# Patient Record
Sex: MALE | Race: WHITE | URBAN - METROPOLITAN AREA
[De-identification: names, ages, dates, MRNs, and addresses within clinical notes are randomized per-mention and may not be internally consistent; named-entity substitution may affect disease eponyms.]

---

## 2019-05-02 ENCOUNTER — OFFICE VISIT (OUTPATIENT)
Dept: PRIMARY CARE CLINIC | Age: 19
End: 2019-05-02
Payer: COMMERCIAL

## 2019-05-02 ENCOUNTER — TELEPHONE (OUTPATIENT)
Dept: ENDOCRINOLOGY | Age: 19
End: 2019-05-02

## 2019-05-02 VITALS
DIASTOLIC BLOOD PRESSURE: 89 MMHG | WEIGHT: 220 LBS | HEIGHT: 73 IN | HEART RATE: 91 BPM | BODY MASS INDEX: 29.16 KG/M2 | SYSTOLIC BLOOD PRESSURE: 131 MMHG

## 2019-05-02 DIAGNOSIS — R73.09 ELEVATED GLUCOSE: ICD-10-CM

## 2019-05-02 DIAGNOSIS — E11.9 DIABETES MELLITUS, NEW ONSET (HCC): Primary | ICD-10-CM

## 2019-05-02 DIAGNOSIS — E11.9 DIABETES MELLITUS, NEW ONSET (HCC): ICD-10-CM

## 2019-05-02 LAB
A/G RATIO: 2 (ref 1.1–2.2)
ALBUMIN SERPL-MCNC: 4.5 G/DL (ref 3.4–5)
ALP BLD-CCNC: 94 U/L (ref 40–129)
ALT SERPL-CCNC: 16 U/L (ref 10–40)
ANION GAP SERPL CALCULATED.3IONS-SCNC: 18 MMOL/L (ref 3–16)
AST SERPL-CCNC: 8 U/L (ref 15–37)
BILIRUB SERPL-MCNC: 0.6 MG/DL (ref 0–1)
BILIRUBIN, POC: NEGATIVE
BLOOD URINE, POC: NEGATIVE
BUN BLDV-MCNC: 10 MG/DL (ref 7–20)
CALCIUM SERPL-MCNC: 10 MG/DL (ref 8.3–10.6)
CHLORIDE BLD-SCNC: 96 MMOL/L (ref 99–110)
CHOLESTEROL, TOTAL: 241 MG/DL (ref 0–199)
CLARITY, POC: ABNORMAL
CO2: 25 MMOL/L (ref 21–32)
COLOR, POC: ABNORMAL
CREAT SERPL-MCNC: <0.5 MG/DL (ref 0.9–1.3)
CREATININE URINE: 71.2 MG/DL (ref 39–259)
GFR AFRICAN AMERICAN: >60
GFR NON-AFRICAN AMERICAN: >60
GLOBULIN: 2.3 G/DL
GLUCOSE BLD-MCNC: 340 MG/DL (ref 70–99)
GLUCOSE URINE, POC: ABNORMAL
HBA1C MFR BLD: 14 %
HDLC SERPL-MCNC: 35 MG/DL (ref 40–60)
KETONES, POC: ABNORMAL
LDL CHOLESTEROL CALCULATED: 151 MG/DL
LEUKOCYTE EST, POC: NEGATIVE
MICROALBUMIN UR-MCNC: <1.2 MG/DL
MICROALBUMIN/CREAT UR-RTO: NORMAL MG/G (ref 0–30)
NITRITE, POC: NEGATIVE
PH, POC: 6
POTASSIUM SERPL-SCNC: 4.7 MMOL/L (ref 3.5–5.1)
PROTEIN, POC: NEGATIVE
SODIUM BLD-SCNC: 139 MMOL/L (ref 136–145)
SPECIFIC GRAVITY, POC: 1.02
TOTAL PROTEIN: 6.8 G/DL (ref 6.4–8.2)
TRIGL SERPL-MCNC: 277 MG/DL (ref 0–150)
UROBILINOGEN, POC: 0.2
VLDLC SERPL CALC-MCNC: 55 MG/DL

## 2019-05-02 PROCEDURE — 81002 URINALYSIS NONAUTO W/O SCOPE: CPT | Performed by: FAMILY MEDICINE

## 2019-05-02 PROCEDURE — 99205 OFFICE O/P NEW HI 60 MIN: CPT | Performed by: FAMILY MEDICINE

## 2019-05-02 PROCEDURE — 83036 HEMOGLOBIN GLYCOSYLATED A1C: CPT | Performed by: FAMILY MEDICINE

## 2019-05-02 SDOH — HEALTH STABILITY: MENTAL HEALTH: HOW OFTEN DO YOU HAVE A DRINK CONTAINING ALCOHOL?: NEVER

## 2019-05-02 ASSESSMENT — ENCOUNTER SYMPTOMS
VOMITING: 0
EYE PAIN: 0
SORE THROAT: 0
NAUSEA: 0
RHINORRHEA: 0
COUGH: 0
COLOR CHANGE: 0
CONSTIPATION: 0
DIARRHEA: 0
ABDOMINAL PAIN: 0
SHORTNESS OF BREATH: 0

## 2019-05-02 NOTE — TELEPHONE ENCOUNTER
I called this patient to see if he could come in May 9 at 3:40 but he cannot do this. He is off Mondays Wednesdays and Fridays.

## 2019-05-02 NOTE — PATIENT INSTRUCTIONS
Patient Education        Home Blood Glucose Test: About This Test  What is it? A home blood glucose test measures the amount of a type of sugar, called glucose, in your blood. Why is this test done? People who have diabetes need to check the amount of glucose in their blood. A home blood glucose test is an easy way to test your blood at home or when you are away from home. The results help you know when to take action to keep your blood glucose levels in a target range. How can you prepare for the test?  · Check the expiration date on the bottle of testing strips. Do not use test strips that have . · Match the code number on the testing strips bottle with the number on the meter. If the numbers do not match, follow the directions with the meter for changing the code number. What happens before the test?  The supplies you will need for testing blood glucose include:  · A blood glucose meter. · Testing strips. These are made to be used with a specific model of meter. · Sugar control solutions. Some meters require a specific solution. Many new meters are made to operate without a control solution. · Short needles called lancets for pricking your skin. · A pen-sized mattson for the lancet (lancet device), which positions the lancet and controls how deeply it goes into your skin. · Clean cotton balls. These are used to stop the bleeding from the testing site. What happens during the test?  A home blood glucose test involves pricking your finger, palm, or forearm with a lancet to collect a drop of blood. The blood drop is placed on a test strip, which you insert into the blood glucose meter. The instructions for testing are slightly different for each blood glucose meter model. Follow the instructions that came with your meter. · Wash your hands with warm, soapy water. Dry them well with a clean towel.  You may also use an alcohol wipe to clean your finger or other site, but make sure your hands are dry before the test.  · Insert a clean lancet into the lancet device. · Remove a test strip from the test strip bottle. Replace the lid immediately to keep moisture away from the other strips. · Follow the instructions that came with your meter to get it ready. · Use the lancet device to stick the side of your fingertip with the lancet. Do not stick the tip of your finger. Some blood sugar meters use lancet devices that take the blood sample from other sites, such as the palm of the hand or the forearm. But the finger is usually the most accurate place to test blood sugar. · Put a drop of blood on the correct spot on the test strip. · Apply pressure with a clean cotton ball to stop the bleeding. · Follow the directions that came with the meter to get the results. · Write down the results and the time that you tested your blood. Some meters will store the results for you. What else should you know about the test?  The American Diabetes Association (ADA) recommends that you stay within the following blood glucose level ranges. But depending on your health, you and your doctor may set a different range for you. For nonpregnant adults with diabetes:  · 80 milligrams per deciliter (mg/dL) to 130 mg/dL before a meal  · Less than 180 mg/dL 1 to 2 hours after a meal  For women who have diabetes related to pregnancy (gestational diabetes):  · 95 mg/dL or less before breakfast  · 120 to 140 mg/dL (or lower) 1 to 2 hours after a meal  How long does the test take? · The blood glucose meter will show the results of the test in a minute or less. Where can you learn more? Go to https://CytoVivajordynInmoo.BareedEE. org and sign in to your NextPage account. Enter E096 in the [facility] box to learn more about \"Home Blood Glucose Test: About This Test.\"     If you do not have an account, please click on the \"Sign Up Now\" link.   Current as of: July 25, 2018  Content Version: 11.9  © 8079-9977 Healthwise, Incorporated. Care instructions adapted under license by [facility] ([facility]). If you have questions about a medical condition or this instruction, always ask your healthcare professional. [address] any warranty or liability for your use of this information. Patient Education        Learning About Insulin Pens  What is an insulin pen? An insulin pen is a device for giving insulin shots. It looks like a pen. Inside the pen is a needle and a cartridge filled with insulin. You can set the dose of insulin with a dial on the outside of the pen. You use the pen to give the insulin shot (injection). Both disposable and reusable insulin pens are available. With a disposable pen, a set amount of insulin comes in the pen ready to use. When the insulin is used up, you throw the pen away. You use a new pen the next time you need insulin. With a reusable pen, you don't throw the pen away. Instead, you reload the pen with a pre-measured cartridge of insulin. When the insulin is used up, you insert a new cartridge into the pen. Disposable and reusable pens both need a new needle with each shot. The needles come in different lengths and widths. Mellette needles will prevent injecting into the muscle, especially in children or people who are lean. Thinner-width needles reduce the pricking sensation. Width is measured by gauge. The higher the number, the thinner the needle. Why do some people prefer pens? · Most people find that insulin pens are easier to use than a bottle and syringe. · Many people feel less pain (or no pain) with the smaller insulin pen needle, compared to a syringe needle. · Insulin pens may help you give yourself more accurate doses. When you draw insulin into a syringe, you must carefully measure so that you don't get too much or too little. But with a pen, you set a dial for the amount of insulin you want, and then you push the button.   · Insulin pens may work better than syringes for people who don't see well or who have problems like arthritis that make it harder to use a syringe. · Using an insulin pen draws less attention from others. You can give yourself insulin with fewer people noticing. · You don't need to carry insulin bottles and syringes everywhere you go. An insulin pen fits into a pocket or purse. What should you know about insulin pens? Each pen delivers a different brand and type (or types) of insulin. Some deliver rapid-acting insulin. Others deliver long-acting insulin. And some pens deliver a mixture of both in one shot. Pens have different colored labels, cartridge holders, or dosing knobs. Many pens have special features. For example, some pens have springs so that it takes less force to deliver a dose of insulin. Other pens have signals you can hear that let you know the insulin has been delivered. Some have memory to show the amount and time of the last dose. How do you use an insulin pen? 1. For a reusable pen, put the insulin cartridge into the pen. Disposable pens already have an insulin cartridge. Follow the directions for how to screw a new needle onto your pen. 2. Remove the outer cap from the needle. Keep this cap to use later. 3. Remove the inner cover from the needle. Be careful not to prick yourself. 4. Before each shot, prime the needle. Priming removes air from the needle. Turn the dose knob to 2 units. Hold your pen with the needle pointing up. Tap the cartridge mattson gently to move any air bubbles to the top. Push the injection button all the way in. Watch for a stream or drop of insulin to come out of the needle. If it does not, repeat this step again. 5. Clean the area of skin where you will give the shot. If you use alcohol to clean the skin, let it dry. Use a different spot each time you inject insulin. That's because using the same spot every time can cause bumps or pits to form in the skin.  For example, inject your insulin above your belly button, then the next time use your upper thigh, and then the next time inject below your belly button. 6. Turn the dose knob to the number of units of insulin you need to inject. Push the needle into your skin. Most people can inject using a 90-degree angle and without pinching the skin. Adults and children who are very lean and people who use longer needles may need to pinch the skin to avoid injecting into muscle. 7. Put your thumb on the injection button and push it in until it stops. Keep the pen in your skin. Hold the dose knob in for 10 seconds (or to the number that the  recommends). Then pull the needle out of your skin. Do not rub the area. 8. Put only the outer cap back over the needle. The thin inner cover is harder to put back on, and you could stick yourself. 9. After covering the needle with the outer cap, unscrew the needle and throw it away in a sharps container or other solid plastic container. You can get a sharps container at your drugstore. 10. Always read the insulin package information that tells the best way to store your insulin pen and insulin cartridges. In general, unopened insulin for pens will last longer if it is kept in the refrigerator. After insulin is opened, most manufacturers say to store it at room temperature. Don't share insulin pens with anyone else who uses insulin. Even when the needle is changed, an insulin pen can carry bacteria or blood that can make another person sick. Where can you learn more? Go to https://NHC Beauty [email or web address].YouChe.com. org and sign in to your Salesvue account. Enter M910 in the OneTwoSee box to learn more about \"Learning About Insulin Pens. \"     If you do not have an account, please click on the \"Sign Up Now\" link. Current as of: July 25, 2018  Content Version: 11.9  © 5943-0284 [employer], Incorporated. Care instructions adapted under license by [facility] ([facility]).  If you have questions about a medical condition or this instruction, always ask your healthcare professional. [address] any warranty or liability for your use of this information. Patient Education        Diabetes Blood Sugar Emergencies: Your Action Plan  How can you prevent a blood sugar emergency? An important part of living with diabetes is keeping your blood sugar in your target range. You'll need to know what to do if it's too high or too low. Managing your blood sugar levels helps you avoid emergencies. This care sheet will teach you about the signs of high and low blood sugar. It will help you make an action plan with your doctor for when these signs occur. Low blood sugar is more likely to happen if you take certain medicines for diabetes. It can also happen if you skip a meal, drink alcohol, or exercise more than usual.  You may get high blood sugar if you eat differently than you normally do. One example is eating more carbohydrate than usual. Having a cold, the flu, or other sudden illness can also cause high blood sugar levels. Levels can also rise if you miss a dose of medicine. Any change in how you take your medicine may affect your blood sugar level. So it's important to work with your doctor before you make any changes. Check your blood sugar  Work with your doctor to fill in the blank spaces below that apply to you. Track your levels, know your target range, and write down ways you can get your blood sugar back in your target range. A log book can help you track your levels. Take the book to all of your medical appointments. · Check your blood sugar _____ times a day, at these times:________________________________________________. (For example: Before meals, at bedtime, before exercise, during exercise, other.)  · Your blood sugar target range before a meal is ___________________. Your blood sugar target range after a meal is _______________________.   · Do can then go back to your regular testing schedule. If your symptoms or blood sugar levels are getting worse or have not improved after 15 minutes, seek medical care right away. Here are some examples of quick-sugar foods with 15 grams of carbohydrate:  · 3 or 4 glucose tablets  · 1 tube of glucose gel  · Hard candy (such as 3 Jolly Ranchers or 5 to 7 Life Savers)  · ½ cup to ¾ cup (4 to 6 ounces) of fruit juice or regular (not diet) soda  High blood sugar  If you have symptoms of high blood sugar, check your blood sugar. Your goal is to get your level back to your target range. If it's above ______ ( for example, above 250), follow these steps:  · If you missed a dose of your diabetes medicine, take it now. Take only the amount of medicine that you have been prescribed. Do not take more or less medicine. · Give yourself insulin if your doctor has prescribed it for high blood sugar. · Test for ketones, if the doctor told you to do so. If the results of the ketone test show a moderate-to-large amount of ketones, call the doctor for advice. · Wait 30 minutes after you take the extra insulin or the missed medicine. Check your blood sugar again. If your symptoms or blood sugar levels are getting worse or have not improved after taking these steps, seek medical care right away. Follow-up care is a key part of your treatment and safety. Be sure to make and go to all appointments, and call your doctor if you are having problems. It's also a good idea to know your test results and keep a list of the medicines you take. Where can you learn more? Go to https://chelisaewrobert.Accumuli Security. org and sign in to your Roadster account. Enter O639 in the [employer] box to learn more about \"Diabetes Blood Sugar Emergencies: Your Action Plan. \"     If you do not have an account, please click on the \"Sign Up Now\" link. Current as of: July 25, 2018  Content Version: 11.9  © 2869-7638 [email or web address], [facility].  Care

## 2019-05-02 NOTE — PROGRESS NOTES
Chief Complaint   Patient presents with    New Patient           HPI:  Kim Castillo is a 19 y.o. (: 2000) here today as a new patient. Well Adult Physical: Kim Castillo is here for a comprehensive physical exam. He reports that he is trying to join [employer]. He says that when he went about 3 weeks ago for his physical and when he was there they did a urine check. He says that he was told he had elevated sugar in his urine. He says that he was told that he should see his PCP and had a glucose blood test.  He notes no blurred vision, denies polyuria and nocturia or numbness or tingling of feet. He tells me he lost 100 lb in the last year though it was \"intentional\" with dietary changes. Do you take any herbs or supplements that were not prescribed by a doctor? no  Are you taking calcium supplements? no   Are you taking aspirin daily? No      He  is exercising 3 time a week for 1 hour    He is somewhat working on managing his diet. 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 Review of Systems   Constitutional: Negative for chills and fever. HENT: Negative for ear pain, rhinorrhea and sore throat. Eyes: Negative for pain and visual disturbance. Respiratory: Negative for cough and shortness of breath. Cardiovascular: Negative for chest pain and palpitations. Gastrointestinal: Negative for abdominal pain, constipation, diarrhea, nausea and vomiting. Genitourinary: Negative for dysuria and frequency. Musculoskeletal: Negative for joint swelling and myalgias. Skin: Negative for color change and rash. Neurological: Negative for weakness, numbness and headaches. Hematological: Negative for adenopathy. Does not bruise/bleed easily. Psychiatric/Behavioral: Negative for dysphoric mood, self-injury and suicidal ideas. The patient is not nervous/anxious.            Allergies not on file  New Prescriptions    BLOOD GLUCOSE TEST STRIPS (RELION GLUCOSE TEST STRIPS) STRIP    1 each by In Vitro route 4 times daily As needed. BLOOD PRESSURE MONITORING (RELION BLOOD PRESSURE MONITOR) KIT    Dispense with supplies and strips sufficient for tid testing (uncontrolled insulin dependent diabetes). INSULIN GLARGINE (BASAGLAR KWIKPEN) 100 UNIT/ML INJECTION PEN    Inject 10 Units into the skin nightly    INSULIN PEN NEEDLE (B-D ULTRAFINE III SHORT PEN) 31G X 8 MM MISC    1 each by Does not apply route daily       Meds Prior to visit:  No current outpatient medications on file prior to visit. No current facility-administered medications on file prior to visit. History reviewed. No pertinent past medical history. History reviewed. No pertinent surgical history. Family History   Problem Relation Age of Onset    Hypertension Mother     Cancer Father         Thyroid    Diabetes Father     No Known Problems Brother     No Known Problems Brother      Social History     Tobacco Use    Smoking status: Former Smoker     Packs/day: 0.00     Years: 0.50     Pack years: 0.00     Last attempt to quit: 2016     Years since quitting: 3.3    Smokeless tobacco: Never Used   Substance Use Topics    Alcohol use: Never     Frequency: Never    Drug use: Never       Objective   /89   Pulse 91   Ht 6' 1\" (1.854 m)   Wt 220 lb (99.8 kg)   BMI 29.03 kg/m²   Wt Readings from Last 3 Encounters:   05/02/19 220 lb (99.8 kg)       Physical Exam   Constitutional: He appears well-developed and well-nourished. HENT:   Head: Normocephalic and atraumatic. Nose: Nose normal.   Mouth/Throat: No oropharyngeal exudate. TMs negative bilaterally, canals patent, nasal mucosa pink and patent,  Oropharynx pink and patent   Eyes: Pupils are equal, round, and reactive to light. Right eye exhibits no discharge. Left eye exhibits no discharge. No scleral icterus. Neck: Normal range of motion. Neck supple.  No thyromegaly present. Cardiovascular: Normal rate, regular rhythm, normal heart sounds and intact distal pulses. Exam reveals no gallop and no friction rub. No murmur heard. Pulses:       Dorsalis pedis pulses are 2+ on the right side, and 2+ on the left side. Posterior tibial pulses are 2+ on the right side, and 2+ on the left side. No Edema Lower Extremities   Pulmonary/Chest: Effort normal and breath sounds normal. He has no wheezes. He has no rales. Abdominal: Soft. Bowel sounds are normal. He exhibits no distension. There is no splenomegaly or hepatomegaly. There is no tenderness. There is no rebound and no guarding. Musculoskeletal: Normal range of motion. He exhibits no tenderness or deformity. Lymphadenopathy:     He has no cervical adenopathy. Neurological: He is alert. He has normal strength. No cranial nerve deficit or sensory deficit. Gait normal.   Foot Exam: Skin warm, dry and intact w/o callus or erythema. Sensation intact to 10gm monofilament     Skin: Skin is warm and dry. No rash noted. No erythema. Psychiatric: He has a normal mood and affect. His speech is normal and behavior is normal.           Assessment   Plan     1. Diabetes mellitus, new onset ([facility] [address]  New dx, had extensive discussion about new dx. Will begin kelly and work to keep him out of DKA if type 1. Discussed with his mother who is a DM educator. Refer for endocrine eval and draw preliminary labs. - Lipid Panel; Future  - Comprehensive Metabolic Panel; Future  - POCT glycosylated hemoglobin (Hb A1C)  - Microalbumin / Creatinine Urine Ratio; Future  -  DIABETES FOOT EXAM  - Glutamic Acid Decarboxylase; Future  - Insulin Antibody; Future  - Dragan Bolton MD, Endocrinology, [place]  - insulin glargine [facility]) 100 UNIT/ML injection pen;  Inject 10 Units into the skin nightly  Dispense: 3 pen; Refill: 0  - Insulin Pen Needle (B-D ULTRAFINE III SHORT PEN) 31G X 8 MM MISC; 1 each by Does not apply route daily  Dispense: 100 each; Refill: 3  - Blood Pressure Monitoring (RELION BLOOD PRESSURE MONITOR) KIT; Dispense with supplies and strips sufficient for tid testing (uncontrolled insulin dependent diabetes). Dispense: 1 kit; Refill: 0  - blood glucose test strips (RELION GLUCOSE TEST STRIPS) strip; 1 each by In Vitro route 4 times daily As needed. Dispense: 100 each; Refill: 3      Reilly received counseling on the following healthy behaviors: nutrition and exercise    Patient given educational materials on Diabetes and shots and blood draws    I have instructed Severo Grime to complete a self tracking handout on Blood Sugars  and instructed them to bring it with them to his next appointment. Discussed use, benefit, and side effects of prescribed medications. Barriers to medication compliance addressed. All patient questions answered. Pt voiced understanding. RTC 2 weeks. Scribe attestation:  Jeyson Roca MA, am scribing for and in the presence of Rosana Morrison MD. Electronically signed by Félix Sparks MA on 5/2/2019 at 7:27 AM          Provider attestation: Kobe Ruiz MD, personally performed the services scribed by the user listed above in my presence, and it is both accurate and complete. I agree with the ROS and Past Histories independently gathered by the clinical support staff and the remaining scribed note accurately describes my personal service to the patient.     [facility]    5/2/2019  8:19 AM

## 2019-05-04 LAB — GLUTAMIC ACID DECARB AB: <5 IU/ML (ref 0–5)

## 2019-05-06 ENCOUNTER — TELEPHONE (OUTPATIENT)
Dept: PRIMARY CARE CLINIC | Age: 19
End: 2019-05-06

## 2019-05-06 LAB — INSULIN A: <0.4 U/ML (ref 0–0.4)

## 2019-05-06 NOTE — TELEPHONE ENCOUNTER
Tried to reach patient again but phone kept ringing. I am sending patient a Prysm message with the information.

## 2019-05-06 NOTE — TELEPHONE ENCOUNTER
Please call : his initial lab for type 1 diabetes is negative. Final determination as to type is yet to be made though.

## 2019-05-17 ENCOUNTER — OFFICE VISIT (OUTPATIENT)
Dept: PRIMARY CARE CLINIC | Age: 19
End: 2019-05-17
Payer: COMMERCIAL

## 2019-05-17 VITALS
HEIGHT: 73 IN | HEART RATE: 85 BPM | BODY MASS INDEX: 28.76 KG/M2 | DIASTOLIC BLOOD PRESSURE: 81 MMHG | WEIGHT: 217 LBS | SYSTOLIC BLOOD PRESSURE: 127 MMHG

## 2019-05-17 DIAGNOSIS — E11.9 DIABETES MELLITUS, NEW ONSET (HCC): Primary | ICD-10-CM

## 2019-05-17 PROCEDURE — 99214 OFFICE O/P EST MOD 30 MIN: CPT | Performed by: FAMILY MEDICINE

## 2019-05-17 RX ORDER — BLOOD-GLUCOSE METER
1 KIT MISCELLANEOUS DAILY
Qty: 1 KIT | Refills: 0 | Status: SHIPPED | OUTPATIENT
Start: 2019-05-17

## 2019-05-17 ASSESSMENT — ENCOUNTER SYMPTOMS
SHORTNESS OF BREATH: 0
DIARRHEA: 0
ABDOMINAL PAIN: 0
NAUSEA: 0
VOMITING: 0
COUGH: 0
CONSTIPATION: 0

## 2019-05-17 ASSESSMENT — PATIENT HEALTH QUESTIONNAIRE - PHQ9
2. FEELING DOWN, DEPRESSED OR HOPELESS: 0
1. LITTLE INTEREST OR PLEASURE IN DOING THINGS: 0
SUM OF ALL RESPONSES TO PHQ QUESTIONS 1-9: 0
SUM OF ALL RESPONSES TO PHQ9 QUESTIONS 1 & 2: 0
SUM OF ALL RESPONSES TO PHQ QUESTIONS 1-9: 0

## 2019-05-17 NOTE — PROGRESS NOTES
Chief Complaint   Patient presents with    Diabetes         HPI:  Cullen Ho is a 19 y.o. (:2000) here today for follow up on his new diagnosis of diabetes. He is compliant with his diabetes medications  without diaphoresis, , sweating, , anxiety, , tremor, , diarrhea,  and hypoglycemia   He is not  Checking blood sugars  . He notes no blurred vision, denies fatigue, his urination has decreased, denies thirst.      Review of Systems   Constitutional: Negative for chills and fever. Respiratory: Negative for cough and shortness of breath. Cardiovascular: Negative for chest pain and palpitations. Gastrointestinal: Negative for abdominal pain, constipation, diarrhea, nausea and vomiting. Endocrine: Negative for polyuria. Genitourinary: Negative for dysuria. No past medical history on file.   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[address] resource strain: Not on file    Food insecurity:     Worry: Not on file     Inability: Not on file   [employer] needs:     Medical: Not on file     Non-medical: Not on file   Tobacco Use    Smoking status: Former Smoker     Packs/day: 0.00     Years: 0.50     Pack years: 0.00     Last attempt to quit: 2016     Years since quitting: 3.3    Smokeless tobacco: Never Used   Substance and Sexual Activity    Alcohol use: Never     Frequency: Never    Drug use: Never    Sexual activity: Not Currently     Partners: Female   Lifestyle    Physical activity:     Days per week: Not on file     Minutes per session: Not on file    Stress: Not on file   Relationships    Social connections:     Talks on phone: Not on file     Gets together: Not on file     Attends Confucianist service: Not on file     Active member of club or organization: Not on file     Attends meetings of clubs or organizations: Not on file     Relationship status: Not on file    Intimate partner violence:     Fear of current or ex partner: Not on file     Emotionally abused: Not on file     Physically abused: Not on file     Forced sexual activity: Not on file   Other Topics Concern    Not on file   Social History Narrative    Not on file       New Prescriptions    No medications on file         Meds Prior to visit:  Prior to Visit Medications    Medication Sig Taking? Authorizing Provider   insulin glargine (BASAGLAR KWIKPEN) 100 UNIT/ML injection pen Inject 10 Units into the skin nightly Yes Juany Carson MD   Insulin Pen Needle (B-D ULTRAFINE III SHORT PEN) 31G X 8 MM MISC 1 each by Does not apply route daily Yes Juany Carson MD   Blood Pressure Monitoring (RELION BLOOD PRESSURE MONITOR) KIT Dispense with supplies and strips sufficient for tid testing (uncontrolled insulin dependent diabetes). Juany Carson MD   blood glucose test strips (RELION GLUCOSE TEST STRIPS) strip 1 each by In Vitro route 4 times daily As needed. Juany Carson MD     No Known Allergies    OBJECTIVE:    /81   Pulse 85   Ht 6' 1\" (1.854 m)   Wt 217 lb (98.4 kg)   BMI 28.63 kg/m²   BP Readings from Last 2 Encounters:   05/17/19 127/81   05/02/19 131/89     Wt Readings from Last 3 Encounters:   05/17/19 217 lb (98.4 kg)   05/02/19 220 lb (99.8 kg)       Physical Exam   Constitutional: He appears well-developed and well-nourished. Cardiovascular: Normal rate and regular rhythm. Exam reveals no gallop and no friction rub. No murmur heard. Pulmonary/Chest: Effort normal and breath sounds normal. He has no wheezes. He has no rales.    Abdominal: Soft. Bowel sounds are normal. He exhibits no distension and no mass. There is no tenderness. Skin: Skin is warm and dry. No rash noted. Hemoglobin A1C (%)   Date Value   05/02/2019 14     Microalbumin, Random Urine (mg/dL)   Date Value   05/02/2019 <1.20     LDL Calculated (mg/dL)   Date Value   05/02/2019 151 (H)       ASSESSMENT/PLAN:    1. Diabetes mellitus, new onset ([address]  Initial testingg negative for Type 1 DM but his symptoms fit this bettter. Will refer for DM teachhing at f/u and begin titration of insulin. Counseled on importance of checkingg BS's  Recheck 10 days.  - glucose monitoring kit (FREESTYLE) monitoring kit; 1 kit by Does not apply route daily  Dispense: 1 kit; Refill: 0  - blood glucose test strips (RELION GLUCOSE TEST STRIPS) strip; 1 each by In Vitro route 4 times daily As needed. Dispense: 100 each; Refill: 3      RTC 10-14 days. Scribe attestation:  Yady Carrizales MA, am scribing for and in the presence of Kayden Brumfield MD. Electronically signed by Noel Jensen MA on 5/17/2019 at 8:25 AM            Provider attestation: Adam Nolen MD, personally performed the services scribed by the user listed above in my presence, and it is both accurate and complete. I agree with the ROS and Past Histories independently gathered by the clinical support staff and the remaining scribed note accurately describes my personal service to the patient.     [facility]    5/17/2019  8:44 AM

## 2019-05-17 NOTE — PATIENT INSTRUCTIONS
[facility] Endocrinology, Cholesterol, and Diabetes- Martine Amado MD  [address] [facility], [address] [facility], [address]  Phone: [phone]    Increase your insulin dosage by 2 units every 3 days until your morning blood sugar is below 140.

## 2019-06-04 ENCOUNTER — OFFICE VISIT (OUTPATIENT)
Dept: PRIMARY CARE CLINIC | Age: 19
End: 2019-06-04
Payer: COMMERCIAL

## 2019-06-04 VITALS
HEIGHT: 73 IN | DIASTOLIC BLOOD PRESSURE: 86 MMHG | BODY MASS INDEX: 29.16 KG/M2 | HEART RATE: 100 BPM | SYSTOLIC BLOOD PRESSURE: 124 MMHG | WEIGHT: 220 LBS

## 2019-06-04 DIAGNOSIS — E11.9 DIABETES MELLITUS, NEW ONSET (HCC): Primary | ICD-10-CM

## 2019-06-04 PROCEDURE — 99213 OFFICE O/P EST LOW 20 MIN: CPT | Performed by: FAMILY MEDICINE

## 2019-06-04 ASSESSMENT — ENCOUNTER SYMPTOMS
CONSTIPATION: 0
SHORTNESS OF BREATH: 0
VOMITING: 0
DIARRHEA: 0
NAUSEA: 0
COUGH: 0
ABDOMINAL PAIN: 0

## 2019-06-04 NOTE — PATIENT INSTRUCTIONS
[facility], [facility] - [facility]  [address] [place] [address]  Phone: 330 Yulissa LIZ Endocrinology and Diabetes- [facility] KEARA Waters  [address] [place] [address]  Phone: [phone]

## 2019-06-04 NOTE — PROGRESS NOTES
Chief Complaint   Patient presents with    Diabetes         HPI:  Elias Escalante is a 19 y.o. (:2000) here today for Diabetes. He is compliant with his diabetes medications (20 Units of basaglar qhs)  without diaphoresis, , sweating, , anxiety, , tremor, , diarrhea,  and hypoglycemia   He is not checking blood sugars. Review of Systems   Constitutional: Negative for chills and fever. Respiratory: Negative for cough and shortness of breath. Cardiovascular: Negative for chest pain and palpitations. Gastrointestinal: Negative for abdominal pain, constipation, diarrhea, nausea and vomiting. Endocrine: Negative for polyuria. Genitourinary: Negative for dysuria. No past medical history on file.   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[address] resource strain: Not on file    Food insecurity:     Worry: Not on file     Inability: Not on file   [employer] needs:     Medical: Not on file     Non-medical: Not on file   Tobacco Use    Smoking status: Former Smoker     Packs/day: 0.00     Years: 0.50     Pack years: 0.00     Last attempt to quit: 2016     Years since quitting: 3.4    Smokeless tobacco: Never Used   Substance and Sexual Activity    Alcohol use: Never     Frequency: Never    Drug use: Never    Sexual activity: Not Currently     Partners: Female   Lifestyle    Physical activity:     Days per week: Not on file     Minutes per session: Not on file    Stress: Not on file   Relationships    Social connections:     Talks on phone: Not on file     Gets together: Not on file     Attends Congregation service: Not on file     Active member of club or organization: Not on file     Attends meetings of clubs or organizations: Not on file     Relationship status: Not on file    Intimate partner violence:     Fear of current or ex partner: Not on file     Emotionally abused: Not on file     Physically abused: Not on file     Forced sexual activity: Not on file   Other Topics Concern    Not on file   Social History Narrative    Not on file       New Prescriptions    No medications on file         Meds Prior to visit:  Prior to Visit Medications    Medication Sig Taking? Authorizing Provider   glucose monitoring kit (FREESTYLE) monitoring kit 1 kit by Does not apply route daily  Sebastián Noland MD   blood glucose test strips (RELION GLUCOSE TEST STRIPS) strip 1 each by In Vitro route 4 times daily As needed. Sebastián Noland MD   insulin glargine [facility]) 100 UNIT/ML injection pen Inject 10 Units into the skin nightly  Patient taking differently: Inject 20 Units into the skin nightly   Sebastián Noland MD   Insulin Pen Needle (B-D ULTRAFINE III SHORT PEN) 31G X 8 MM MISC 1 each by Does not apply route daily  Sebastián Noland MD     No Known Allergies    OBJECTIVE:    /86   Pulse 100   Ht 6' 1\" (1.854 m)   Wt 220 lb (99.8 kg)   BMI 29.03 kg/m²   BP Readings from Last 2 Encounters:   05/17/19 127/81   05/02/19 131/89     Wt Readings from Last 3 Encounters:   05/17/19 217 lb (98.4 kg)   05/02/19 220 lb (99.8 kg)       Physical Exam   Constitutional: He appears well-developed and well-nourished. HENT:   Right Ear: Tympanic membrane and ear canal normal.   Left Ear: Tympanic membrane and ear canal normal.   Nose: Nose normal.   Mouth/Throat: Uvula is midline, oropharynx is clear and moist and mucous membranes are normal.   Nares Pink and Patent,   Eyes: Pupils are equal, round, and reactive to light.  Conjunctivae and EOM are normal. No scleral icterus. Neck: Neck supple. No thyromegaly present. Cardiovascular: Normal rate, regular rhythm and normal heart sounds. Exam reveals no gallop. No murmur heard. no edema lower extremities   Pulmonary/Chest: Effort normal and breath sounds normal. He has no wheezes. He has no rales. Abdominal: Soft. Bowel sounds are normal. He exhibits no distension and no mass. There is no tenderness. There is no rebound. Lymphadenopathy:     He has no cervical adenopathy. Skin: Skin is warm. No rash noted. No erythema. Psychiatric: He has a normal mood and affect. His behavior is normal. Judgment and thought content normal.   Vitals reviewed. Hemoglobin A1C (%)   Date Value   05/02/2019 14     Microalbumin, Random Urine (mg/dL)   Date Value   05/02/2019 <1.20     LDL Calculated (mg/dL)   Date Value   05/02/2019 151 (H)       ASSESSMENT/PLAN:    1. Diabetes mellitus, new onset ([facility] [address] [facility]  Improved but not controlled: HgbA1c 12.x. Unfortunately we can not safely titrate his insulin w/o him checking BS's , will encourage to check BS's and f/u with Dr. Toma Haile.   - Ambulatory Referral To Diabetes Education  - insulin glargine (BASAGLAR KWIKPEN) 100 UNIT/ML injection pen; Inject 20 Units into the skin nightly  Dispense: 6 pen; Refill: 1  - Kayla Gottron, MD, Endocrinology, [facility] No future appointments. 1 month      Scribe attestation:  Robert Reynoso MA, am scribing for and in the presence of Jesus Purdy MD. Electronically signed by Brandon Batista MA on 6/4/2019 at 2:43 PM            Provider attestation: Jarrod Laura MD, personally performed the services scribed by the user listed above in my presence, and it is both accurate and complete.  I agree with the ROS and Past Histories independently gathered by the clinical support staff and the remaining scribed note accurately describes my personal service to the

## 2019-07-17 ENCOUNTER — OFFICE VISIT (OUTPATIENT)
Dept: ENDOCRINOLOGY | Age: 19
End: 2019-07-17
Payer: COMMERCIAL

## 2019-07-17 ENCOUNTER — TELEPHONE (OUTPATIENT)
Dept: FAMILY MEDICINE CLINIC | Age: 19
End: 2019-07-17

## 2019-07-17 DIAGNOSIS — E11.9 TYPE 2 DIABETES MELLITUS WITHOUT COMPLICATION, WITHOUT LONG-TERM CURRENT USE OF INSULIN (HCC): Primary | ICD-10-CM

## 2019-07-17 PROCEDURE — 97802 MEDICAL NUTRITION INDIV IN: CPT | Performed by: DIETITIAN, REGISTERED

## 2019-07-17 NOTE — PROGRESS NOTES
home:occasionally  Food Availability Problems: No  Usual Food consumption:   3 meals and 1 snack, minimal vegetable and fruit inclusion in food selection  Patient's father shops for food and produce is not consistently part of the purchases. Barriers:   -none          Follow Up Plan: Will remain available as needed. Patient has my contact information.     Referring Provider: Cordell Wright MD  Time spent with patient: 60 minutes

## 2019-07-17 NOTE — TELEPHONE ENCOUNTER
Pt was referred to dr Linda Singh by dr Rosemary Coles and belgica joseph said he needs to be seen sooner than what dr Linda Singh has open and to possibly get him in with one of dr Steven Alatorre associates      Please call pt

## 2019-07-18 ENCOUNTER — TELEPHONE (OUTPATIENT)
Dept: PRIMARY CARE CLINIC | Age: 19
End: 2019-07-18

## 2019-08-08 ENCOUNTER — TELEPHONE (OUTPATIENT)
Dept: PRIMARY CARE CLINIC | Age: 19
End: 2019-08-08

## 2019-08-08 NOTE — TELEPHONE ENCOUNTER
Per hipaa, lmom for pt to return call in regards to 8/8/19 missed appointment. Is Patient seeing another provider? If so, its ok but we need to send letter. If not Patient needs to reschedule per Dr. Vannesa Kendall within 1 month.

## 2025-04-13 ENCOUNTER — HOSPITAL ENCOUNTER (INPATIENT)
Age: 25
LOS: 10 days | Discharge: HOME HEALTH | DRG: 853 | End: 2025-04-23
Attending: EMERGENCY MEDICINE | Admitting: INTERNAL MEDICINE

## 2025-04-13 ENCOUNTER — APPOINTMENT (OUTPATIENT)
Dept: GENERAL RADIOLOGY | Age: 25
DRG: 853 | End: 2025-04-13

## 2025-04-13 DIAGNOSIS — E10.10 DKA, TYPE 1, NOT AT GOAL (HCC): ICD-10-CM

## 2025-04-13 DIAGNOSIS — M86.9 DIABETIC FOOT ULCER WITH OSTEOMYELITIS (HCC): Primary | ICD-10-CM

## 2025-04-13 DIAGNOSIS — A41.9 SEPTICEMIA (HCC): ICD-10-CM

## 2025-04-13 DIAGNOSIS — L97.509 DIABETIC FOOT ULCER WITH OSTEOMYELITIS (HCC): Primary | ICD-10-CM

## 2025-04-13 DIAGNOSIS — E11.69 DIABETIC FOOT ULCER WITH OSTEOMYELITIS (HCC): Primary | ICD-10-CM

## 2025-04-13 DIAGNOSIS — E11.621 DIABETIC FOOT ULCER WITH OSTEOMYELITIS (HCC): Primary | ICD-10-CM

## 2025-04-13 DIAGNOSIS — E08.10 DIABETIC KETOACIDOSIS WITHOUT COMA ASSOCIATED WITH DIABETES MELLITUS DUE TO UNDERLYING CONDITION (HCC): ICD-10-CM

## 2025-04-13 DIAGNOSIS — Z71.89 GOALS OF CARE, COUNSELING/DISCUSSION: ICD-10-CM

## 2025-04-13 DIAGNOSIS — E11.628 DIABETIC FOOT INFECTION (HCC): ICD-10-CM

## 2025-04-13 DIAGNOSIS — L08.9 DIABETIC FOOT INFECTION (HCC): ICD-10-CM

## 2025-04-13 DIAGNOSIS — M86.9 OSTEOMYELITIS OF RIGHT FOOT, UNSPECIFIED TYPE (HCC): ICD-10-CM

## 2025-04-13 LAB
ALBUMIN SERPL-MCNC: 4 G/DL (ref 3.4–5)
ALBUMIN/GLOB SERPL: 1 {RATIO} (ref 1.1–2.2)
ALP SERPL-CCNC: 129 U/L (ref 40–129)
ALT SERPL-CCNC: 9 U/L (ref 10–40)
ANION GAP SERPL CALCULATED.3IONS-SCNC: 12 MMOL/L (ref 3–16)
ANION GAP SERPL CALCULATED.3IONS-SCNC: 19 MMOL/L (ref 3–16)
ANION GAP SERPL CALCULATED.3IONS-SCNC: 24 MMOL/L (ref 3–16)
APTT BLD: 31.5 SEC (ref 22.1–36.4)
AST SERPL-CCNC: 10 U/L (ref 15–37)
BASE EXCESS BLDA CALC-SCNC: -20.1 MMOL/L (ref -3–3)
BASE EXCESS BLDV CALC-SCNC: -20.6 MMOL/L
BASOPHILS # BLD: 0 K/UL (ref 0–0.2)
BASOPHILS NFR BLD: 0 %
BETA-HYDROXYBUTYRATE: 6.59 MMOL/L (ref 0–0.27)
BILIRUB SERPL-MCNC: 0.6 MG/DL (ref 0–1)
BUN SERPL-MCNC: 7 MG/DL (ref 7–20)
BUN SERPL-MCNC: 8 MG/DL (ref 7–20)
BUN SERPL-MCNC: 8 MG/DL (ref 7–20)
CALCIUM SERPL-MCNC: 8.4 MG/DL (ref 8.3–10.6)
CALCIUM SERPL-MCNC: 8.6 MG/DL (ref 8.3–10.6)
CALCIUM SERPL-MCNC: 9.4 MG/DL (ref 8.3–10.6)
CHLORIDE SERPL-SCNC: 103 MMOL/L (ref 99–110)
CHLORIDE SERPL-SCNC: 106 MMOL/L (ref 99–110)
CHLORIDE SERPL-SCNC: 92 MMOL/L (ref 99–110)
CO2 BLDA-SCNC: 6.5 MMOL/L
CO2 BLDV-SCNC: 8 MMOL/L
CO2 SERPL-SCNC: 12 MMOL/L (ref 21–32)
CO2 SERPL-SCNC: 7 MMOL/L (ref 21–32)
CO2 SERPL-SCNC: 8 MMOL/L (ref 21–32)
COHGB MFR BLDA: 1.3 % (ref 0–1.5)
COHGB MFR BLDV: 1.9 %
CREAT SERPL-MCNC: 0.6 MG/DL (ref 0.9–1.3)
CREAT SERPL-MCNC: 0.7 MG/DL (ref 0.9–1.3)
CREAT SERPL-MCNC: 0.8 MG/DL (ref 0.9–1.3)
CRP SERPL-MCNC: 233 MG/L (ref 0–5.1)
DEPRECATED RDW RBC AUTO: 12.3 % (ref 12.4–15.4)
EOSINOPHIL # BLD: 0 K/UL (ref 0–0.6)
EOSINOPHIL NFR BLD: 0 %
EST. AVERAGE GLUCOSE BLD GHB EST-MCNC: 286.2 MG/DL
GFR SERPLBLD CREATININE-BSD FMLA CKD-EPI: >90 ML/MIN/{1.73_M2}
GLUCOSE BLD-MCNC: 147 MG/DL (ref 70–99)
GLUCOSE BLD-MCNC: 154 MG/DL (ref 70–99)
GLUCOSE BLD-MCNC: 171 MG/DL (ref 70–99)
GLUCOSE BLD-MCNC: 188 MG/DL (ref 70–99)
GLUCOSE BLD-MCNC: 192 MG/DL (ref 70–99)
GLUCOSE BLD-MCNC: 210 MG/DL (ref 70–99)
GLUCOSE BLD-MCNC: 225 MG/DL (ref 70–99)
GLUCOSE BLD-MCNC: 292 MG/DL (ref 70–99)
GLUCOSE BLD-MCNC: 344 MG/DL (ref 70–99)
GLUCOSE SERPL-MCNC: 182 MG/DL (ref 70–99)
GLUCOSE SERPL-MCNC: 278 MG/DL (ref 70–99)
GLUCOSE SERPL-MCNC: 411 MG/DL (ref 70–99)
HBA1C MFR BLD: 11.6 %
HCO3 BLDA-SCNC: 6 MMOL/L (ref 21–29)
HCO3 BLDV-SCNC: 8 MMOL/L (ref 23–29)
HCT VFR BLD AUTO: 42.9 % (ref 40.5–52.5)
HGB BLD-MCNC: 14.4 G/DL (ref 13.5–17.5)
HGB BLDA-MCNC: 13.8 G/DL (ref 13.5–17.5)
INR PPP: 1.07 (ref 0.85–1.15)
LACTATE BLDV-SCNC: 1.1 MMOL/L (ref 0.4–1.9)
LACTATE BLDV-SCNC: 1.1 MMOL/L (ref 0.4–1.9)
LYMPHOCYTES # BLD: 1.5 K/UL (ref 1–5.1)
LYMPHOCYTES NFR BLD: 9 %
MAGNESIUM SERPL-MCNC: 1.83 MG/DL (ref 1.8–2.4)
MAGNESIUM SERPL-MCNC: 1.87 MG/DL (ref 1.8–2.4)
MCH RBC QN AUTO: 30.1 PG (ref 26–34)
MCHC RBC AUTO-ENTMCNC: 33.5 G/DL (ref 31–36)
MCV RBC AUTO: 90 FL (ref 80–100)
METHGB MFR BLDA: 0.4 %
METHGB MFR BLDV: 0.6 %
MONOCYTES # BLD: 1.5 K/UL (ref 0–1.3)
MONOCYTES NFR BLD: 9 %
NEUTROPHILS # BLD: 13.4 K/UL (ref 1.7–7.7)
NEUTROPHILS NFR BLD: 82 %
O2 THERAPY: ABNORMAL
O2 THERAPY: ABNORMAL
PCO2 BLDA: 16.1 MMHG (ref 35–45)
PCO2 BLDV: 24.2 MMHG (ref 40–50)
PERFORMED ON: ABNORMAL
PH BLDA: 7.18 [PH] (ref 7.35–7.45)
PH BLDV: 7.1 [PH] (ref 7.35–7.45)
PHOSPHATE SERPL-MCNC: 0.7 MG/DL (ref 2.5–4.9)
PHOSPHATE SERPL-MCNC: 1.2 MG/DL (ref 2.5–4.9)
PLATELET # BLD AUTO: 373 K/UL (ref 135–450)
PLATELET BLD QL SMEAR: ADEQUATE
PMV BLD AUTO: 7.6 FL (ref 5–10.5)
PO2 BLDA: 119 MMHG (ref 75–108)
PO2 BLDV: 36 MMHG
POTASSIUM SERPL-SCNC: 3.8 MMOL/L (ref 3.5–5.1)
POTASSIUM SERPL-SCNC: 3.9 MMOL/L (ref 3.5–5.1)
POTASSIUM SERPL-SCNC: 4.6 MMOL/L (ref 3.5–5.1)
PROCALCITONIN SERPL IA-MCNC: 0.29 NG/ML (ref 0–0.15)
PROT SERPL-MCNC: 8.1 G/DL (ref 6.4–8.2)
PROTHROMBIN TIME: 14.1 SEC (ref 11.9–14.9)
RBC # BLD AUTO: 4.77 M/UL (ref 4.2–5.9)
RBC MORPH BLD: NORMAL
SAO2 % BLDA: 98.5 %
SAO2 % BLDV: 67 %
SLIDE REVIEW: ABNORMAL
SODIUM SERPL-SCNC: 123 MMOL/L (ref 136–145)
SODIUM SERPL-SCNC: 130 MMOL/L (ref 136–145)
SODIUM SERPL-SCNC: 130 MMOL/L (ref 136–145)
WBC # BLD AUTO: 16.4 K/UL (ref 4–11)

## 2025-04-13 PROCEDURE — 73630 X-RAY EXAM OF FOOT: CPT

## 2025-04-13 PROCEDURE — 2580000003 HC RX 258: Performed by: NURSE PRACTITIONER

## 2025-04-13 PROCEDURE — 36415 COLL VENOUS BLD VENIPUNCTURE: CPT

## 2025-04-13 PROCEDURE — 80053 COMPREHEN METABOLIC PANEL: CPT

## 2025-04-13 PROCEDURE — 2000000000 HC ICU R&B

## 2025-04-13 PROCEDURE — 83735 ASSAY OF MAGNESIUM: CPT

## 2025-04-13 PROCEDURE — 99285 EMERGENCY DEPT VISIT HI MDM: CPT

## 2025-04-13 PROCEDURE — 2500000003 HC RX 250 WO HCPCS: Performed by: INTERNAL MEDICINE

## 2025-04-13 PROCEDURE — 85025 COMPLETE CBC W/AUTO DIFF WBC: CPT

## 2025-04-13 PROCEDURE — 96374 THER/PROPH/DIAG INJ IV PUSH: CPT

## 2025-04-13 PROCEDURE — 85730 THROMBOPLASTIN TIME PARTIAL: CPT

## 2025-04-13 PROCEDURE — 02HV33Z INSERTION OF INFUSION DEVICE INTO SUPERIOR VENA CAVA, PERCUTANEOUS APPROACH: ICD-10-PCS | Performed by: INTERNAL MEDICINE

## 2025-04-13 PROCEDURE — 82010 KETONE BODYS QUAN: CPT

## 2025-04-13 PROCEDURE — 85610 PROTHROMBIN TIME: CPT

## 2025-04-13 PROCEDURE — 6360000002 HC RX W HCPCS: Performed by: INTERNAL MEDICINE

## 2025-04-13 PROCEDURE — 36600 WITHDRAWAL OF ARTERIAL BLOOD: CPT

## 2025-04-13 PROCEDURE — 83605 ASSAY OF LACTIC ACID: CPT

## 2025-04-13 PROCEDURE — 6370000000 HC RX 637 (ALT 250 FOR IP): Performed by: INTERNAL MEDICINE

## 2025-04-13 PROCEDURE — 82803 BLOOD GASES ANY COMBINATION: CPT

## 2025-04-13 PROCEDURE — 2580000003 HC RX 258: Performed by: INTERNAL MEDICINE

## 2025-04-13 PROCEDURE — 83036 HEMOGLOBIN GLYCOSYLATED A1C: CPT

## 2025-04-13 PROCEDURE — 6360000002 HC RX W HCPCS: Performed by: NURSE PRACTITIONER

## 2025-04-13 PROCEDURE — 6370000000 HC RX 637 (ALT 250 FOR IP): Performed by: NURSE PRACTITIONER

## 2025-04-13 PROCEDURE — 84100 ASSAY OF PHOSPHORUS: CPT

## 2025-04-13 PROCEDURE — 87040 BLOOD CULTURE FOR BACTERIA: CPT

## 2025-04-13 PROCEDURE — 86140 C-REACTIVE PROTEIN: CPT

## 2025-04-13 PROCEDURE — 84145 PROCALCITONIN (PCT): CPT

## 2025-04-13 RX ORDER — 0.9 % SODIUM CHLORIDE 0.9 %
15 INTRAVENOUS SOLUTION INTRAVENOUS ONCE
Status: DISCONTINUED | OUTPATIENT
Start: 2025-04-13 | End: 2025-04-13

## 2025-04-13 RX ORDER — ACETAMINOPHEN 500 MG
1000 TABLET ORAL
Status: COMPLETED | OUTPATIENT
Start: 2025-04-13 | End: 2025-04-13

## 2025-04-13 RX ORDER — SODIUM CHLORIDE 450 MG/100ML
INJECTION, SOLUTION INTRAVENOUS CONTINUOUS
Status: DISCONTINUED | OUTPATIENT
Start: 2025-04-13 | End: 2025-04-13

## 2025-04-13 RX ORDER — POLYETHYLENE GLYCOL 3350 17 G/17G
17 POWDER, FOR SOLUTION ORAL DAILY PRN
Status: DISCONTINUED | OUTPATIENT
Start: 2025-04-13 | End: 2025-04-23 | Stop reason: HOSPADM

## 2025-04-13 RX ORDER — DEXTROSE MONOHYDRATE AND SODIUM CHLORIDE 5; .45 G/100ML; G/100ML
INJECTION, SOLUTION INTRAVENOUS CONTINUOUS PRN
Status: DISCONTINUED | OUTPATIENT
Start: 2025-04-13 | End: 2025-04-23 | Stop reason: HOSPADM

## 2025-04-13 RX ORDER — SODIUM CHLORIDE 9 MG/ML
INJECTION, SOLUTION INTRAVENOUS CONTINUOUS
Status: DISCONTINUED | OUTPATIENT
Start: 2025-04-13 | End: 2025-04-18 | Stop reason: SDUPTHER

## 2025-04-13 RX ORDER — POTASSIUM CHLORIDE 7.45 MG/ML
10 INJECTION INTRAVENOUS PRN
Status: DISCONTINUED | OUTPATIENT
Start: 2025-04-13 | End: 2025-04-14

## 2025-04-13 RX ORDER — MAGNESIUM SULFATE IN WATER 40 MG/ML
2000 INJECTION, SOLUTION INTRAVENOUS PRN
Status: DISCONTINUED | OUTPATIENT
Start: 2025-04-13 | End: 2025-04-23 | Stop reason: HOSPADM

## 2025-04-13 RX ORDER — ENOXAPARIN SODIUM 100 MG/ML
40 INJECTION SUBCUTANEOUS
Status: DISCONTINUED | OUTPATIENT
Start: 2025-04-13 | End: 2025-04-21 | Stop reason: SDUPTHER

## 2025-04-13 RX ORDER — 0.9 % SODIUM CHLORIDE 0.9 %
30 INTRAVENOUS SOLUTION INTRAVENOUS ONCE
Status: COMPLETED | OUTPATIENT
Start: 2025-04-13 | End: 2025-04-13

## 2025-04-13 RX ORDER — VANCOMYCIN 1.75 G/350ML
1250 INJECTION, SOLUTION INTRAVENOUS EVERY 8 HOURS
Status: DISCONTINUED | OUTPATIENT
Start: 2025-04-14 | End: 2025-04-14

## 2025-04-13 RX ORDER — DEXTROSE MONOHYDRATE AND SODIUM CHLORIDE 5; .45 G/100ML; G/100ML
INJECTION, SOLUTION INTRAVENOUS CONTINUOUS PRN
Status: DISCONTINUED | OUTPATIENT
Start: 2025-04-13 | End: 2025-04-13

## 2025-04-13 RX ORDER — POTASSIUM CHLORIDE 7.45 MG/ML
10 INJECTION INTRAVENOUS PRN
Status: DISCONTINUED | OUTPATIENT
Start: 2025-04-13 | End: 2025-04-13

## 2025-04-13 RX ORDER — MAGNESIUM SULFATE IN WATER 40 MG/ML
2000 INJECTION, SOLUTION INTRAVENOUS PRN
Status: DISCONTINUED | OUTPATIENT
Start: 2025-04-13 | End: 2025-04-13

## 2025-04-13 RX ADMIN — SODIUM PHOSPHATE, MONOBASIC, MONOHYDRATE AND SODIUM PHOSPHATE, DIBASIC, ANHYDROUS 15 MMOL: 142; 276 INJECTION, SOLUTION INTRAVENOUS at 21:10

## 2025-04-13 RX ADMIN — ACETAMINOPHEN 1000 MG: 500 TABLET ORAL at 14:08

## 2025-04-13 RX ADMIN — SODIUM CHLORIDE 3000 MG: 9 INJECTION, SOLUTION INTRAVENOUS at 14:10

## 2025-04-13 RX ADMIN — SODIUM CHLORIDE 2466 ML: 9 INJECTION, SOLUTION INTRAVENOUS at 14:08

## 2025-04-13 RX ADMIN — POTASSIUM CHLORIDE 10 MEQ: 7.46 INJECTION, SOLUTION INTRAVENOUS at 18:47

## 2025-04-13 RX ADMIN — INSULIN HUMAN 5.7 UNITS/HR: 1 INJECTION, SOLUTION INTRAVENOUS at 14:47

## 2025-04-13 RX ADMIN — DEXTROSE AND SODIUM CHLORIDE: 5; .45 INJECTION, SOLUTION INTRAVENOUS at 17:41

## 2025-04-13 RX ADMIN — SODIUM CHLORIDE: 0.9 INJECTION, SOLUTION INTRAVENOUS at 16:32

## 2025-04-13 RX ADMIN — VANCOMYCIN HYDROCHLORIDE 1500 MG: 1.5 INJECTION, POWDER, LYOPHILIZED, FOR SOLUTION INTRAVENOUS at 16:43

## 2025-04-13 RX ADMIN — INSULIN HUMAN 6.6 UNITS/HR: 1 INJECTION, SOLUTION INTRAVENOUS at 16:53

## 2025-04-13 RX ADMIN — ENOXAPARIN SODIUM 40 MG: 100 INJECTION SUBCUTANEOUS at 20:10

## 2025-04-13 RX ADMIN — POTASSIUM CHLORIDE 10 MEQ: 7.46 INJECTION, SOLUTION INTRAVENOUS at 17:46

## 2025-04-13 RX ADMIN — SODIUM CHLORIDE 3000 MG: 9 INJECTION, SOLUTION INTRAVENOUS at 20:09

## 2025-04-13 RX ADMIN — POTASSIUM CHLORIDE 10 MEQ: 7.46 INJECTION, SOLUTION INTRAVENOUS at 19:58

## 2025-04-13 ASSESSMENT — PAIN SCALES - GENERAL
PAINLEVEL_OUTOF10: 7
PAINLEVEL_OUTOF10: 4
PAINLEVEL_OUTOF10: 8
PAINLEVEL_OUTOF10: 0

## 2025-04-13 ASSESSMENT — PAIN DESCRIPTION - DESCRIPTORS: DESCRIPTORS: ACHING

## 2025-04-13 ASSESSMENT — PAIN DESCRIPTION - FREQUENCY: FREQUENCY: CONTINUOUS

## 2025-04-13 ASSESSMENT — PAIN DESCRIPTION - ORIENTATION: ORIENTATION: RIGHT

## 2025-04-13 ASSESSMENT — PAIN DESCRIPTION - ONSET: ONSET: ON-GOING

## 2025-04-13 ASSESSMENT — PAIN DESCRIPTION - LOCATION: LOCATION: FOOT

## 2025-04-13 ASSESSMENT — PAIN DESCRIPTION - PAIN TYPE: TYPE: ACUTE PAIN

## 2025-04-13 NOTE — ED PROVIDER NOTES
This patient was seen by the Mid-Level Provider. I have seen and examined the patient, agree with the workup, evaluation, management and diagnosis. Care plan has been discussed. My assessment reveals a 18-year-old male who presents with a right foot wound and redness.  This is a 24-year-old male recently diagnosed with diabetes who presents with an infected right lower foot.  This has been going on for the last month or so.  The patient apparently recently moved to [place] and moved back home currently with his mother here in [place].  His diabetes is apparently poorly controlled.          Radiology results:    XR FOOT RIGHT (MIN 3 VIEWS)   Final Result   Soft tissue swelling of the foot. No evidence of osteomyelitis.               LABS:    Labs Reviewed   C-REACTIVE PROTEIN - Abnormal; Notable for the following components:       Result Value    .0 (*)     All other components within normal limits    Narrative:     CALL  [employer] tel. [phone],  Chemistry results called to and read back by Shady Bennett RN, 04/13/2025  13:41, by PAULINA   PROCALCITONIN - Abnormal; Notable for the following components:    Procalcitonin 0.29 (*)     All other components within normal limits    Narrative:     CALL  [employer] tel. [phone],  Chemistry results called to and read back by Shady Bennett RN, 04/13/2025  13:41, by PAULINA   CBC WITH AUTO DIFFERENTIAL - Abnormal; Notable for the following components:    WBC 16.4 (*)     RDW 12.3 (*)     Neutrophils Absolute 13.4 (*)     Monocytes Absolute 1.5 (*)     All other components within normal limits   COMPREHENSIVE METABOLIC PANEL W/ REFLEX TO MG FOR LOW K - Abnormal; Notable for the following components:    Sodium 123 (*)     Chloride 92 (*)     CO2 7 (*)     Anion Gap 24 (*)     Glucose 411 (*)     Creatinine 0.8 (*)     Albumin/Globulin Ratio 1.0 (*)     ALT 9 (*)     AST 10 (*)     All other components within normal limits    Narrative:     CALL  [employer] tel.

## 2025-04-13 NOTE — H&P
Hospital Medicine History & Physical      PCP: No primary care provider on file.    Date of Admission: 4/13/2025    Date of Service: Pt seen/examined on 04/13/2025 and Admitted to Inpatient with expected LOS greater than two midnights due to medical therapy.     Chief Complaint: Foot redness, generalized weakness      History Of Present Illness:      24 y.o. male who presented to [facility] with right foot redness and generalized weakness, apparently having edema erythema of right foot for more than 2 to 3 weeks now but significantly worse in the last 2 to 3 days with drainage mainly from lateral aspects distally close to fifth toe, also complaining of pain with ambulation up to 2-3 out of 10, at this time associated with generalized weakness denies fever or chills on presentation to ED found to have elevated anion gap, being admitted with diagnosis of sepsis and DKA.  Discussed with ED provider agrees plan as outlined below.  To note that patient is not taking any medication at this time related to diabetes.  Also patient was complaining of diarrhea 2-3 times a day for the last few days denies recent antibiotics use, denies any abdominal pain at this time    Past Medical History:      No past medical history on file.  Diabetes mellitus  Past Surgical History:      No past surgical history on file.    Medications Prior to Admission:      Prior to Admission medications    Medication Sig Start Date End Date Taking? Authorizing Provider   insulin glargine (BASAGLAR KWIKPEN) 100 UNIT/ML injection pen Inject 20 Units into the skin nightly 6/4/19   Tj Potter MD   glucose monitoring kit (FREESTYLE) monitoring kit 1 kit by Does not apply route daily 5/17/19   Tj Potter MD   blood glucose test strips (RELION GLUCOSE TEST STRIPS) strip 1 each by In Vitro route 4 times daily As needed. 5/17/19   Tj Potter MD   Insulin Pen Needle (B-D ULTRAFINE III SHORT PEN) 31G X 8 MM MISC 1 each by Does not apply

## 2025-04-13 NOTE — ED PROVIDER NOTES
[facility] ICU  EMERGENCY DEPARTMENT ENCOUNTER        Pt Name: Reilly Alexander  MRN: [id number]  Birthdate 2000  Date of evaluation: 4/13/2025  Provider: DU Beatty - CNP  PCP: No primary care provider on file.  Note Started: 5:38 PM EDT 4/13/25       I have seen and evaluated this patient with my supervising physician, Dr. Koch.      CHIEF COMPLAINT       Chief Complaint   Patient presents with    Foot Problem     Pt reports worsening right foot wound \"for the last month\". Pt states that he recently moved here from [place] and has been having insurance issues. Pt alert and oriented at this time with no signs of distress noted. Pt rates pain as a 8/10.       HISTORY OF PRESENT ILLNESS: 1 or more Elements     History From: Patient and mother  Limitations to history : None    Reilly Alexander is a 24 y.o. nontoxic, unwell appearing male who presents to the emerged part for evaluation of a right foot wound present for the past 1 month.  Patient has a history of uncontrolled diabetes mellitus and has not been taking his medications.  There is an insurance issue.  Patient complains of \"tingling and sore\" 6/10 discomfort in the right foot with swelling.  Accompanying symptoms include chills and resolved diarrhea that was present 2 days ago.  Denies lightheadedness, dizziness, presyncope, shortness of breath, patient fevers, cough, headache, bodyaches, abdominal pain, urinary symptoms/retention, other symptoms/concerns.    Nursing Notes were all reviewed and agreed with or any disagreements were addressed in the HPI.    REVIEW OF SYSTEMS :      Review of Systems   Constitutional:  Positive for chills. Negative for diaphoresis, fatigue and fever.   HENT:  Negative for congestion and sore throat.    Eyes:  Negative for pain and visual disturbance.   Respiratory:  Negative for cough and shortness of breath.    Cardiovascular:  Negative for chest pain and leg swelling.   Gastrointestinal:  Positive for

## 2025-04-14 PROBLEM — E11.628 DIABETIC FOOT INFECTION (HCC): Status: ACTIVE | Noted: 2025-04-14

## 2025-04-14 PROBLEM — L08.9 DIABETIC FOOT INFECTION (HCC): Status: ACTIVE | Noted: 2025-04-14

## 2025-04-14 PROBLEM — L97.509 DIABETIC FOOT ULCER WITH OSTEOMYELITIS (HCC): Status: ACTIVE | Noted: 2025-04-14

## 2025-04-14 PROBLEM — A41.9 SEPTICEMIA (HCC): Status: ACTIVE | Noted: 2025-04-14

## 2025-04-14 PROBLEM — E11.621 DIABETIC FOOT ULCER WITH OSTEOMYELITIS (HCC): Status: ACTIVE | Noted: 2025-04-14

## 2025-04-14 PROBLEM — A41.9 SEPSIS WITHOUT ACUTE ORGAN DYSFUNCTION (HCC): Status: ACTIVE | Noted: 2025-04-14

## 2025-04-14 PROBLEM — D72.828 NEUTROPHILIA: Status: ACTIVE | Noted: 2025-04-14

## 2025-04-14 PROBLEM — R50.9 FEVER AND CHILLS: Status: ACTIVE | Noted: 2025-04-14

## 2025-04-14 PROBLEM — E08.10 DIABETIC KETOACIDOSIS WITHOUT COMA ASSOCIATED WITH DIABETES MELLITUS DUE TO UNDERLYING CONDITION: Status: ACTIVE | Noted: 2025-04-14

## 2025-04-14 PROBLEM — E87.20 ACIDOSIS: Status: ACTIVE | Noted: 2025-04-14

## 2025-04-14 PROBLEM — R79.82 CRP ELEVATED: Status: ACTIVE | Noted: 2025-04-14

## 2025-04-14 PROBLEM — M86.9 DIABETIC FOOT ULCER WITH OSTEOMYELITIS (HCC): Status: ACTIVE | Noted: 2025-04-14

## 2025-04-14 PROBLEM — E10.65 POOR CONTROL TYPE I DIABETES MELLITUS (HCC): Status: ACTIVE | Noted: 2025-04-14

## 2025-04-14 PROBLEM — R78.89 ELEVATED BETA-HYDROXYBUTYRATE: Status: ACTIVE | Noted: 2025-04-14

## 2025-04-14 PROBLEM — R70.0 ELEVATED ERYTHROCYTE SEDIMENTATION RATE: Status: ACTIVE | Noted: 2025-04-14

## 2025-04-14 PROBLEM — E11.69 DIABETIC FOOT ULCER WITH OSTEOMYELITIS (HCC): Status: ACTIVE | Noted: 2025-04-14

## 2025-04-14 LAB
ANION GAP SERPL CALCULATED.3IONS-SCNC: 10 MMOL/L (ref 3–16)
ANION GAP SERPL CALCULATED.3IONS-SCNC: 12 MMOL/L (ref 3–16)
ANION GAP SERPL CALCULATED.3IONS-SCNC: 13 MMOL/L (ref 3–16)
ANION GAP SERPL CALCULATED.3IONS-SCNC: 13 MMOL/L (ref 3–16)
BASOPHILS # BLD: 0.1 K/UL (ref 0–0.2)
BASOPHILS NFR BLD: 1 %
BETA-HYDROXYBUTYRATE: 1.03 MMOL/L (ref 0–0.27)
BUN SERPL-MCNC: 4 MG/DL (ref 7–20)
BUN SERPL-MCNC: 5 MG/DL (ref 7–20)
BUN SERPL-MCNC: 5 MG/DL (ref 7–20)
BUN SERPL-MCNC: 6 MG/DL (ref 7–20)
BUN SERPL-MCNC: 6 MG/DL (ref 7–20)
BUN SERPL-MCNC: 7 MG/DL (ref 7–20)
CALCIUM SERPL-MCNC: 8.1 MG/DL (ref 8.3–10.6)
CALCIUM SERPL-MCNC: 8.2 MG/DL (ref 8.3–10.6)
CALCIUM SERPL-MCNC: 8.2 MG/DL (ref 8.3–10.6)
CALCIUM SERPL-MCNC: 8.3 MG/DL (ref 8.3–10.6)
CHLORIDE SERPL-SCNC: 100 MMOL/L (ref 99–110)
CHLORIDE SERPL-SCNC: 101 MMOL/L (ref 99–110)
CHLORIDE SERPL-SCNC: 103 MMOL/L (ref 99–110)
CHLORIDE SERPL-SCNC: 104 MMOL/L (ref 99–110)
CHLORIDE SERPL-SCNC: 106 MMOL/L (ref 99–110)
CHLORIDE SERPL-SCNC: 106 MMOL/L (ref 99–110)
CO2 SERPL-SCNC: 15 MMOL/L (ref 21–32)
CO2 SERPL-SCNC: 16 MMOL/L (ref 21–32)
CO2 SERPL-SCNC: 16 MMOL/L (ref 21–32)
CO2 SERPL-SCNC: 17 MMOL/L (ref 21–32)
CO2 SERPL-SCNC: 18 MMOL/L (ref 21–32)
CO2 SERPL-SCNC: 19 MMOL/L (ref 21–32)
CREAT SERPL-MCNC: 0.4 MG/DL (ref 0.9–1.3)
CREAT SERPL-MCNC: 0.5 MG/DL (ref 0.9–1.3)
DEPRECATED RDW RBC AUTO: 12.1 % (ref 12.4–15.4)
EOSINOPHIL # BLD: 0 K/UL (ref 0–0.6)
EOSINOPHIL NFR BLD: 0 %
GFR SERPLBLD CREATININE-BSD FMLA CKD-EPI: >90 ML/MIN/{1.73_M2}
GLUCOSE BLD-MCNC: 148 MG/DL (ref 70–99)
GLUCOSE BLD-MCNC: 158 MG/DL (ref 70–99)
GLUCOSE BLD-MCNC: 162 MG/DL (ref 70–99)
GLUCOSE BLD-MCNC: 162 MG/DL (ref 70–99)
GLUCOSE BLD-MCNC: 164 MG/DL (ref 70–99)
GLUCOSE BLD-MCNC: 166 MG/DL (ref 70–99)
GLUCOSE BLD-MCNC: 167 MG/DL (ref 70–99)
GLUCOSE BLD-MCNC: 170 MG/DL (ref 70–99)
GLUCOSE BLD-MCNC: 222 MG/DL (ref 70–99)
GLUCOSE BLD-MCNC: 226 MG/DL (ref 70–99)
GLUCOSE BLD-MCNC: 251 MG/DL (ref 70–99)
GLUCOSE BLD-MCNC: 263 MG/DL (ref 70–99)
GLUCOSE SERPL-MCNC: 175 MG/DL (ref 70–99)
GLUCOSE SERPL-MCNC: 178 MG/DL (ref 70–99)
GLUCOSE SERPL-MCNC: 202 MG/DL (ref 70–99)
GLUCOSE SERPL-MCNC: 267 MG/DL (ref 70–99)
GLUCOSE SERPL-MCNC: 270 MG/DL (ref 70–99)
GLUCOSE SERPL-MCNC: 283 MG/DL (ref 70–99)
HCT VFR BLD AUTO: 31.4 % (ref 40.5–52.5)
HGB BLD-MCNC: 11.3 G/DL (ref 13.5–17.5)
LYMPHOCYTES # BLD: 0.9 K/UL (ref 1–5.1)
LYMPHOCYTES NFR BLD: 9 %
MAGNESIUM SERPL-MCNC: 1.73 MG/DL (ref 1.8–2.4)
MAGNESIUM SERPL-MCNC: 1.79 MG/DL (ref 1.8–2.4)
MAGNESIUM SERPL-MCNC: 1.83 MG/DL (ref 1.8–2.4)
MAGNESIUM SERPL-MCNC: 1.84 MG/DL (ref 1.8–2.4)
MAGNESIUM SERPL-MCNC: 2.05 MG/DL (ref 1.8–2.4)
MCH RBC QN AUTO: 31.1 PG (ref 26–34)
MCHC RBC AUTO-ENTMCNC: 35.9 G/DL (ref 31–36)
MCV RBC AUTO: 86.6 FL (ref 80–100)
MONOCYTES # BLD: 1.1 K/UL (ref 0–1.3)
MONOCYTES NFR BLD: 10 %
NEUTROPHILS # BLD: 8.4 K/UL (ref 1.7–7.7)
NEUTROPHILS NFR BLD: 80 %
PERFORMED ON: ABNORMAL
PHOSPHATE SERPL-MCNC: 1.1 MG/DL (ref 2.5–4.9)
PHOSPHATE SERPL-MCNC: 1.3 MG/DL (ref 2.5–4.9)
PHOSPHATE SERPL-MCNC: 1.4 MG/DL (ref 2.5–4.9)
PHOSPHATE SERPL-MCNC: 1.7 MG/DL (ref 2.5–4.9)
PLATELET # BLD AUTO: 293 K/UL (ref 135–450)
PLATELET BLD QL SMEAR: ADEQUATE
PMV BLD AUTO: 7.3 FL (ref 5–10.5)
POLYCHROMASIA BLD QL SMEAR: ABNORMAL
POTASSIUM SERPL-SCNC: 3.2 MMOL/L (ref 3.5–5.1)
POTASSIUM SERPL-SCNC: 3.3 MMOL/L (ref 3.5–5.1)
POTASSIUM SERPL-SCNC: 3.5 MMOL/L (ref 3.5–5.1)
POTASSIUM SERPL-SCNC: 3.8 MMOL/L (ref 3.5–5.1)
POTASSIUM SERPL-SCNC: 3.8 MMOL/L (ref 3.5–5.1)
POTASSIUM SERPL-SCNC: 4 MMOL/L (ref 3.5–5.1)
RBC # BLD AUTO: 3.62 M/UL (ref 4.2–5.9)
SLIDE REVIEW: ABNORMAL
SODIUM SERPL-SCNC: 130 MMOL/L (ref 136–145)
SODIUM SERPL-SCNC: 132 MMOL/L (ref 136–145)
SODIUM SERPL-SCNC: 133 MMOL/L (ref 136–145)
SODIUM SERPL-SCNC: 134 MMOL/L (ref 136–145)
VANCOMYCIN SERPL-MCNC: 7.7 UG/ML
WBC # BLD AUTO: 10.5 K/UL (ref 4–11)

## 2025-04-14 PROCEDURE — 2000000000 HC ICU R&B

## 2025-04-14 PROCEDURE — 94760 N-INVAS EAR/PLS OXIMETRY 1: CPT

## 2025-04-14 PROCEDURE — 87205 SMEAR GRAM STAIN: CPT

## 2025-04-14 PROCEDURE — 6370000000 HC RX 637 (ALT 250 FOR IP): Performed by: HOSPITALIST

## 2025-04-14 PROCEDURE — 82010 KETONE BODYS QUAN: CPT

## 2025-04-14 PROCEDURE — 84100 ASSAY OF PHOSPHORUS: CPT

## 2025-04-14 PROCEDURE — 87076 CULTURE ANAEROBE IDENT EACH: CPT

## 2025-04-14 PROCEDURE — 36415 COLL VENOUS BLD VENIPUNCTURE: CPT

## 2025-04-14 PROCEDURE — 2580000003 HC RX 258: Performed by: INTERNAL MEDICINE

## 2025-04-14 PROCEDURE — 80048 BASIC METABOLIC PNL TOTAL CA: CPT

## 2025-04-14 PROCEDURE — 87070 CULTURE OTHR SPECIMN AEROBIC: CPT

## 2025-04-14 PROCEDURE — 80202 ASSAY OF VANCOMYCIN: CPT

## 2025-04-14 PROCEDURE — 99291 CRITICAL CARE FIRST HOUR: CPT | Performed by: INTERNAL MEDICINE

## 2025-04-14 PROCEDURE — 87077 CULTURE AEROBIC IDENTIFY: CPT

## 2025-04-14 PROCEDURE — 2500000003 HC RX 250 WO HCPCS: Performed by: INTERNAL MEDICINE

## 2025-04-14 PROCEDURE — 6360000002 HC RX W HCPCS: Performed by: INTERNAL MEDICINE

## 2025-04-14 PROCEDURE — 87075 CULTR BACTERIA EXCEPT BLOOD: CPT

## 2025-04-14 PROCEDURE — 6370000000 HC RX 637 (ALT 250 FOR IP): Performed by: INTERNAL MEDICINE

## 2025-04-14 PROCEDURE — 85025 COMPLETE CBC W/AUTO DIFF WBC: CPT

## 2025-04-14 PROCEDURE — 83735 ASSAY OF MAGNESIUM: CPT

## 2025-04-14 RX ORDER — INSULIN LISPRO 100 [IU]/ML
0-4 INJECTION, SOLUTION INTRAVENOUS; SUBCUTANEOUS
Status: DISCONTINUED | OUTPATIENT
Start: 2025-04-14 | End: 2025-04-16

## 2025-04-14 RX ORDER — 0.9 % SODIUM CHLORIDE 0.9 %
500 INTRAVENOUS SOLUTION INTRAVENOUS ONCE
Status: COMPLETED | OUTPATIENT
Start: 2025-04-14 | End: 2025-04-14

## 2025-04-14 RX ORDER — INSULIN GLARGINE 100 [IU]/ML
20 INJECTION, SOLUTION SUBCUTANEOUS ONCE
Status: COMPLETED | OUTPATIENT
Start: 2025-04-14 | End: 2025-04-14

## 2025-04-14 RX ORDER — POTASSIUM CHLORIDE 1500 MG/1
40 TABLET, EXTENDED RELEASE ORAL PRN
Status: DISCONTINUED | OUTPATIENT
Start: 2025-04-14 | End: 2025-04-23 | Stop reason: HOSPADM

## 2025-04-14 RX ORDER — GLUCAGON 1 MG/ML
1 KIT INJECTION PRN
Status: DISCONTINUED | OUTPATIENT
Start: 2025-04-14 | End: 2025-04-23 | Stop reason: HOSPADM

## 2025-04-14 RX ORDER — DEXTROSE MONOHYDRATE 100 MG/ML
INJECTION, SOLUTION INTRAVENOUS CONTINUOUS PRN
Status: DISCONTINUED | OUTPATIENT
Start: 2025-04-14 | End: 2025-04-23 | Stop reason: HOSPADM

## 2025-04-14 RX ORDER — POTASSIUM CHLORIDE 7.45 MG/ML
10 INJECTION INTRAVENOUS PRN
Status: DISCONTINUED | OUTPATIENT
Start: 2025-04-14 | End: 2025-04-23 | Stop reason: HOSPADM

## 2025-04-14 RX ADMIN — INSULIN LISPRO 2 UNITS: 100 INJECTION, SOLUTION INTRAVENOUS; SUBCUTANEOUS at 12:22

## 2025-04-14 RX ADMIN — SODIUM CHLORIDE 500 ML: 9 INJECTION, SOLUTION INTRAVENOUS at 09:58

## 2025-04-14 RX ADMIN — POTASSIUM CHLORIDE 40 MEQ: 1500 TABLET, EXTENDED RELEASE ORAL at 09:58

## 2025-04-14 RX ADMIN — SODIUM PHOSPHATE, MONOBASIC, MONOHYDRATE AND SODIUM PHOSPHATE, DIBASIC, ANHYDROUS 15 MMOL: 142; 276 INJECTION, SOLUTION INTRAVENOUS at 10:51

## 2025-04-14 RX ADMIN — INSULIN GLARGINE 20 UNITS: 100 INJECTION, SOLUTION SUBCUTANEOUS at 06:17

## 2025-04-14 RX ADMIN — INSULIN HUMAN 4.4 UNITS/HR: 1 INJECTION, SOLUTION INTRAVENOUS at 04:10

## 2025-04-14 RX ADMIN — ENOXAPARIN SODIUM 40 MG: 100 INJECTION SUBCUTANEOUS at 21:15

## 2025-04-14 RX ADMIN — SODIUM CHLORIDE 3000 MG: 9 INJECTION, SOLUTION INTRAVENOUS at 08:20

## 2025-04-14 RX ADMIN — VANCOMYCIN 1250 MG: 1.75 INJECTION, SOLUTION INTRAVENOUS at 01:20

## 2025-04-14 RX ADMIN — POTASSIUM CHLORIDE 10 MEQ: 7.46 INJECTION, SOLUTION INTRAVENOUS at 05:17

## 2025-04-14 RX ADMIN — SODIUM CHLORIDE 3000 MG: 9 INJECTION, SOLUTION INTRAVENOUS at 20:59

## 2025-04-14 RX ADMIN — INSULIN LISPRO 1 UNITS: 100 INJECTION, SOLUTION INTRAVENOUS; SUBCUTANEOUS at 09:17

## 2025-04-14 RX ADMIN — POTASSIUM CHLORIDE 10 MEQ: 7.46 INJECTION, SOLUTION INTRAVENOUS at 06:22

## 2025-04-14 RX ADMIN — INSULIN LISPRO 1 UNITS: 100 INJECTION, SOLUTION INTRAVENOUS; SUBCUTANEOUS at 16:37

## 2025-04-14 RX ADMIN — POTASSIUM CHLORIDE 10 MEQ: 7.46 INJECTION, SOLUTION INTRAVENOUS at 03:27

## 2025-04-14 RX ADMIN — SODIUM PHOSPHATE, MONOBASIC, MONOHYDRATE AND SODIUM PHOSPHATE, DIBASIC, ANHYDROUS 15 MMOL: 142; 276 INJECTION, SOLUTION INTRAVENOUS at 04:12

## 2025-04-14 RX ADMIN — INSULIN LISPRO 2 UNITS: 100 INJECTION, SOLUTION INTRAVENOUS; SUBCUTANEOUS at 21:14

## 2025-04-14 RX ADMIN — SODIUM CHLORIDE 3000 MG: 9 INJECTION, SOLUTION INTRAVENOUS at 14:12

## 2025-04-14 RX ADMIN — DAPTOMYCIN 450 MG: 500 INJECTION, POWDER, LYOPHILIZED, FOR SOLUTION INTRAVENOUS at 17:20

## 2025-04-14 RX ADMIN — VANCOMYCIN 1250 MG: 1.75 INJECTION, SOLUTION INTRAVENOUS at 09:17

## 2025-04-14 RX ADMIN — DEXTROSE AND SODIUM CHLORIDE: 5; .45 INJECTION, SOLUTION INTRAVENOUS at 00:46

## 2025-04-14 RX ADMIN — SODIUM PHOSPHATE, MONOBASIC, MONOHYDRATE AND SODIUM PHOSPHATE, DIBASIC, ANHYDROUS 15 MMOL: 142; 276 INJECTION, SOLUTION INTRAVENOUS at 16:22

## 2025-04-14 RX ADMIN — SODIUM CHLORIDE 3000 MG: 9 INJECTION, SOLUTION INTRAVENOUS at 02:26

## 2025-04-14 ASSESSMENT — PAIN SCALES - GENERAL
PAINLEVEL_OUTOF10: 0

## 2025-04-14 NOTE — CARE COORDINATION
Case Management Assessment  Initial Evaluation    Date/Time of Evaluation: 4/14/2025 3:50 PM  Assessment Completed by: RADHA Lake    If patient is discharged prior to next notation, then this note serves as note for discharge by case management.    Patient Name: Reilly Alexander                   YOB: 2000  Diagnosis: Septicemia (HCC) [A41.9]  Goals of care, counseling/discussion [Z71.89]  DKA, type 1, not at goal (HCC) [E10.10]  Diabetic foot infection (HCC) [E11.628, L08.9]  Diabetic ketoacidosis without coma associated with diabetes mellitus due to underlying condition [E08.10]                   Date / Time: 4/13/2025 12:25 PM    Patient Admission Status: Inpatient   Readmission Risk (Low < 19, Mod (19-27), High > 27): Readmission Risk Score: 11.8    Current PCP: No primary care provider on file.  PCP verified by CM? No (Dr. Gore vs [facility])    Chart Reviewed: Yes      History Provided by: Patient  Patient Orientation: Alert and Oriented    Patient Cognition: Alert    Hospitalization in the last 30 days (Readmission):  No    If yes, Readmission Assessment in CM Navigator will be completed.    Advance Directives:      Code Status: Full Code   Patient's Primary Decision Maker is: Legal Next of Kin      Discharge Planning:    Patient lives with: Parent, Alone Type of Home: House  Primary Care Giver: Self  Patient Support Systems include: Parent, Family Members   Current Financial resources: None  Current community resources: None  Current services prior to admission: None            Current DME:              Type of Home Care services:  None    ADLS  Prior functional level: Independent in ADLs/IADLs  Current functional level: Independent in ADLs/IADLs    PT AM-PAC:   /24  OT AM-PAC:   /24    Family can provide assistance at DC: Yes  Would you like Case Management to discuss the discharge plan with any other family members/significant others, and if so, who? Yes  Plans to Return to

## 2025-04-15 ENCOUNTER — APPOINTMENT (OUTPATIENT)
Dept: MRI IMAGING | Age: 25
DRG: 853 | End: 2025-04-15

## 2025-04-15 LAB
ANION GAP SERPL CALCULATED.3IONS-SCNC: 13 MMOL/L (ref 3–16)
BASOPHILS # BLD: 0.1 K/UL (ref 0–0.2)
BASOPHILS NFR BLD: 0.9 %
BUN SERPL-MCNC: 5 MG/DL (ref 7–20)
CALCIUM SERPL-MCNC: 8.1 MG/DL (ref 8.3–10.6)
CHLORIDE SERPL-SCNC: 100 MMOL/L (ref 99–110)
CO2 SERPL-SCNC: 20 MMOL/L (ref 21–32)
CREAT SERPL-MCNC: 0.4 MG/DL (ref 0.9–1.3)
DEPRECATED RDW RBC AUTO: 12.5 % (ref 12.4–15.4)
EOSINOPHIL # BLD: 0 K/UL (ref 0–0.6)
EOSINOPHIL NFR BLD: 0.3 %
GFR SERPLBLD CREATININE-BSD FMLA CKD-EPI: >90 ML/MIN/{1.73_M2}
GLUCOSE BLD-MCNC: 182 MG/DL (ref 70–99)
GLUCOSE BLD-MCNC: 202 MG/DL (ref 70–99)
GLUCOSE BLD-MCNC: 207 MG/DL (ref 70–99)
GLUCOSE BLD-MCNC: 209 MG/DL (ref 70–99)
GLUCOSE BLD-MCNC: 214 MG/DL (ref 70–99)
GLUCOSE SERPL-MCNC: 227 MG/DL (ref 70–99)
HCT VFR BLD AUTO: 31.3 % (ref 40.5–52.5)
HGB BLD-MCNC: 11.2 G/DL (ref 13.5–17.5)
LYMPHOCYTES # BLD: 1.5 K/UL (ref 1–5.1)
LYMPHOCYTES NFR BLD: 22 %
MAGNESIUM SERPL-MCNC: 1.98 MG/DL (ref 1.8–2.4)
MCH RBC QN AUTO: 30.4 PG (ref 26–34)
MCHC RBC AUTO-ENTMCNC: 35.7 G/DL (ref 31–36)
MCV RBC AUTO: 85.1 FL (ref 80–100)
MONOCYTES # BLD: 1.1 K/UL (ref 0–1.3)
MONOCYTES NFR BLD: 15.3 %
NEUTROPHILS # BLD: 4.2 K/UL (ref 1.7–7.7)
NEUTROPHILS NFR BLD: 61.5 %
PERFORMED ON: ABNORMAL
PHOSPHATE SERPL-MCNC: 1.8 MG/DL (ref 2.5–4.9)
PHOSPHATE SERPL-MCNC: 2.2 MG/DL (ref 2.5–4.9)
PHOSPHATE SERPL-MCNC: 2.4 MG/DL (ref 2.5–4.9)
PLATELET # BLD AUTO: 268 K/UL (ref 135–450)
PMV BLD AUTO: 7.4 FL (ref 5–10.5)
POTASSIUM SERPL-SCNC: 3.2 MMOL/L (ref 3.5–5.1)
POTASSIUM SERPL-SCNC: 3.3 MMOL/L (ref 3.5–5.1)
RBC # BLD AUTO: 3.68 M/UL (ref 4.2–5.9)
SODIUM SERPL-SCNC: 133 MMOL/L (ref 136–145)
WBC # BLD AUTO: 6.9 K/UL (ref 4–11)

## 2025-04-15 PROCEDURE — 85025 COMPLETE CBC W/AUTO DIFF WBC: CPT

## 2025-04-15 PROCEDURE — 2500000003 HC RX 250 WO HCPCS: Performed by: INTERNAL MEDICINE

## 2025-04-15 PROCEDURE — 2060000000 HC ICU INTERMEDIATE R&B

## 2025-04-15 PROCEDURE — 6360000002 HC RX W HCPCS: Performed by: INTERNAL MEDICINE

## 2025-04-15 PROCEDURE — 84132 ASSAY OF SERUM POTASSIUM: CPT

## 2025-04-15 PROCEDURE — 6370000000 HC RX 637 (ALT 250 FOR IP): Performed by: HOSPITALIST

## 2025-04-15 PROCEDURE — 73720 MRI LWR EXTREMITY W/O&W/DYE: CPT

## 2025-04-15 PROCEDURE — 99254 IP/OBS CNSLTJ NEW/EST MOD 60: CPT | Performed by: SURGERY

## 2025-04-15 PROCEDURE — 6370000000 HC RX 637 (ALT 250 FOR IP): Performed by: INTERNAL MEDICINE

## 2025-04-15 PROCEDURE — 99233 SBSQ HOSP IP/OBS HIGH 50: CPT | Performed by: INTERNAL MEDICINE

## 2025-04-15 PROCEDURE — 83735 ASSAY OF MAGNESIUM: CPT

## 2025-04-15 PROCEDURE — A9579 GAD-BASE MR CONTRAST NOS,1ML: HCPCS | Performed by: INTERNAL MEDICINE

## 2025-04-15 PROCEDURE — 94760 N-INVAS EAR/PLS OXIMETRY 1: CPT

## 2025-04-15 PROCEDURE — 6360000004 HC RX CONTRAST MEDICATION: Performed by: INTERNAL MEDICINE

## 2025-04-15 PROCEDURE — 2580000003 HC RX 258: Performed by: INTERNAL MEDICINE

## 2025-04-15 PROCEDURE — 36415 COLL VENOUS BLD VENIPUNCTURE: CPT

## 2025-04-15 PROCEDURE — 84100 ASSAY OF PHOSPHORUS: CPT

## 2025-04-15 PROCEDURE — 80048 BASIC METABOLIC PNL TOTAL CA: CPT

## 2025-04-15 RX ORDER — INSULIN GLARGINE 100 [IU]/ML
20 INJECTION, SOLUTION SUBCUTANEOUS DAILY
Status: DISCONTINUED | OUTPATIENT
Start: 2025-04-15 | End: 2025-04-17

## 2025-04-15 RX ORDER — INSULIN LISPRO 100 [IU]/ML
5 INJECTION, SOLUTION INTRAVENOUS; SUBCUTANEOUS
Status: DISCONTINUED | OUTPATIENT
Start: 2025-04-15 | End: 2025-04-17

## 2025-04-15 RX ADMIN — GADOTERIDOL 16 ML: 279.3 INJECTION, SOLUTION INTRAVENOUS at 13:05

## 2025-04-15 RX ADMIN — INSULIN LISPRO 5 UNITS: 100 INJECTION, SOLUTION INTRAVENOUS; SUBCUTANEOUS at 18:40

## 2025-04-15 RX ADMIN — INSULIN LISPRO 1 UNITS: 100 INJECTION, SOLUTION INTRAVENOUS; SUBCUTANEOUS at 20:26

## 2025-04-15 RX ADMIN — INSULIN LISPRO 1 UNITS: 100 INJECTION, SOLUTION INTRAVENOUS; SUBCUTANEOUS at 08:06

## 2025-04-15 RX ADMIN — ENOXAPARIN SODIUM 40 MG: 100 INJECTION SUBCUTANEOUS at 20:17

## 2025-04-15 RX ADMIN — POTASSIUM CHLORIDE 40 MEQ: 1500 TABLET, EXTENDED RELEASE ORAL at 17:59

## 2025-04-15 RX ADMIN — SODIUM CHLORIDE 3000 MG: 9 INJECTION, SOLUTION INTRAVENOUS at 02:27

## 2025-04-15 RX ADMIN — INSULIN GLARGINE 20 UNITS: 100 INJECTION, SOLUTION SUBCUTANEOUS at 09:31

## 2025-04-15 RX ADMIN — SODIUM CHLORIDE 3000 MG: 9 INJECTION, SOLUTION INTRAVENOUS at 07:49

## 2025-04-15 RX ADMIN — SODIUM PHOSPHATE, MONOBASIC, MONOHYDRATE AND SODIUM PHOSPHATE, DIBASIC, ANHYDROUS 15 MMOL: 142; 276 INJECTION, SOLUTION INTRAVENOUS at 02:29

## 2025-04-15 RX ADMIN — INSULIN LISPRO 1 UNITS: 100 INJECTION, SOLUTION INTRAVENOUS; SUBCUTANEOUS at 13:57

## 2025-04-15 RX ADMIN — POTASSIUM CHLORIDE 40 MEQ: 1500 TABLET, EXTENDED RELEASE ORAL at 02:34

## 2025-04-15 RX ADMIN — SODIUM PHOSPHATE, MONOBASIC, MONOHYDRATE AND SODIUM PHOSPHATE, DIBASIC, ANHYDROUS 15 MMOL: 142; 276 INJECTION, SOLUTION INTRAVENOUS at 18:10

## 2025-04-15 RX ADMIN — INSULIN LISPRO 1 UNITS: 100 INJECTION, SOLUTION INTRAVENOUS; SUBCUTANEOUS at 18:40

## 2025-04-15 RX ADMIN — SODIUM PHOSPHATE, MONOBASIC, MONOHYDRATE AND SODIUM PHOSPHATE, DIBASIC, ANHYDROUS 15 MMOL: 142; 276 INJECTION, SOLUTION INTRAVENOUS at 10:10

## 2025-04-15 RX ADMIN — SODIUM CHLORIDE 3000 MG: 9 INJECTION, SOLUTION INTRAVENOUS at 19:08

## 2025-04-15 RX ADMIN — DAPTOMYCIN 450 MG: 500 INJECTION, POWDER, LYOPHILIZED, FOR SOLUTION INTRAVENOUS at 20:20

## 2025-04-15 RX ADMIN — INSULIN LISPRO 5 UNITS: 100 INJECTION, SOLUTION INTRAVENOUS; SUBCUTANEOUS at 14:26

## 2025-04-15 RX ADMIN — SODIUM CHLORIDE 3000 MG: 9 INJECTION, SOLUTION INTRAVENOUS at 13:52

## 2025-04-15 ASSESSMENT — PAIN SCALES - GENERAL
PAINLEVEL_OUTOF10: 0

## 2025-04-16 ENCOUNTER — ANESTHESIA EVENT (OUTPATIENT)
Dept: OPERATING ROOM | Age: 25
End: 2025-04-16

## 2025-04-16 ENCOUNTER — ANESTHESIA (OUTPATIENT)
Dept: OPERATING ROOM | Age: 25
End: 2025-04-16

## 2025-04-16 PROBLEM — M86.471 CHRONIC OSTEOMYELITIS OF RIGHT FOOT WITH DRAINING SINUS (HCC): Status: ACTIVE | Noted: 2025-04-16

## 2025-04-16 PROBLEM — F17.200 CURRENT SMOKER: Status: ACTIVE | Noted: 2025-04-16

## 2025-04-16 LAB
ANION GAP SERPL CALCULATED.3IONS-SCNC: 11 MMOL/L (ref 3–16)
BUN SERPL-MCNC: 6 MG/DL (ref 7–20)
CALCIUM SERPL-MCNC: 8.5 MG/DL (ref 8.3–10.6)
CHLORIDE SERPL-SCNC: 103 MMOL/L (ref 99–110)
CK SERPL-CCNC: 16 U/L (ref 39–308)
CO2 SERPL-SCNC: 25 MMOL/L (ref 21–32)
CREAT SERPL-MCNC: 0.3 MG/DL (ref 0.9–1.3)
GFR SERPLBLD CREATININE-BSD FMLA CKD-EPI: >90 ML/MIN/{1.73_M2}
GLUCOSE BLD-MCNC: 188 MG/DL (ref 70–99)
GLUCOSE BLD-MCNC: 207 MG/DL (ref 70–99)
GLUCOSE BLD-MCNC: 207 MG/DL (ref 70–99)
GLUCOSE BLD-MCNC: 225 MG/DL (ref 70–99)
GLUCOSE BLD-MCNC: 230 MG/DL (ref 70–99)
GLUCOSE BLD-MCNC: 369 MG/DL (ref 70–99)
GLUCOSE BLD-MCNC: 393 MG/DL (ref 70–99)
GLUCOSE SERPL-MCNC: 210 MG/DL (ref 70–99)
MAGNESIUM SERPL-MCNC: 1.99 MG/DL (ref 1.8–2.4)
PERFORMED ON: ABNORMAL
PHOSPHATE SERPL-MCNC: 2.5 MG/DL (ref 2.5–4.9)
POTASSIUM SERPL-SCNC: 3.3 MMOL/L (ref 3.5–5.1)
SODIUM SERPL-SCNC: 139 MMOL/L (ref 136–145)

## 2025-04-16 PROCEDURE — 3700000001 HC ADD 15 MINUTES (ANESTHESIA): Performed by: ORTHOPAEDIC SURGERY

## 2025-04-16 PROCEDURE — 28005 TREAT FOOT BONE LESION: CPT | Performed by: ORTHOPAEDIC SURGERY

## 2025-04-16 PROCEDURE — 6370000000 HC RX 637 (ALT 250 FOR IP): Performed by: HOSPITALIST

## 2025-04-16 PROCEDURE — 84100 ASSAY OF PHOSPHORUS: CPT

## 2025-04-16 PROCEDURE — 2500000003 HC RX 250 WO HCPCS: Performed by: NURSE ANESTHETIST, CERTIFIED REGISTERED

## 2025-04-16 PROCEDURE — 7100000000 HC PACU RECOVERY - FIRST 15 MIN: Performed by: ORTHOPAEDIC SURGERY

## 2025-04-16 PROCEDURE — 6370000000 HC RX 637 (ALT 250 FOR IP): Performed by: STUDENT IN AN ORGANIZED HEALTH CARE EDUCATION/TRAINING PROGRAM

## 2025-04-16 PROCEDURE — 94760 N-INVAS EAR/PLS OXIMETRY 1: CPT

## 2025-04-16 PROCEDURE — 80048 BASIC METABOLIC PNL TOTAL CA: CPT

## 2025-04-16 PROCEDURE — 6360000002 HC RX W HCPCS: Performed by: INTERNAL MEDICINE

## 2025-04-16 PROCEDURE — 6360000002 HC RX W HCPCS: Performed by: ORTHOPAEDIC SURGERY

## 2025-04-16 PROCEDURE — 2500000003 HC RX 250 WO HCPCS: Performed by: ORTHOPAEDIC SURGERY

## 2025-04-16 PROCEDURE — 2709999900 HC NON-CHARGEABLE SUPPLY: Performed by: ORTHOPAEDIC SURGERY

## 2025-04-16 PROCEDURE — 1200000000 HC SEMI PRIVATE

## 2025-04-16 PROCEDURE — 36415 COLL VENOUS BLD VENIPUNCTURE: CPT

## 2025-04-16 PROCEDURE — 87076 CULTURE ANAEROBE IDENT EACH: CPT

## 2025-04-16 PROCEDURE — 7100000001 HC PACU RECOVERY - ADDTL 15 MIN: Performed by: ORTHOPAEDIC SURGERY

## 2025-04-16 PROCEDURE — 6370000000 HC RX 637 (ALT 250 FOR IP): Performed by: ORTHOPAEDIC SURGERY

## 2025-04-16 PROCEDURE — 0Q9N0ZZ DRAINAGE OF RIGHT METATARSAL, OPEN APPROACH: ICD-10-PCS | Performed by: ORTHOPAEDIC SURGERY

## 2025-04-16 PROCEDURE — 87077 CULTURE AEROBIC IDENTIFY: CPT

## 2025-04-16 PROCEDURE — 2580000003 HC RX 258: Performed by: ORTHOPAEDIC SURGERY

## 2025-04-16 PROCEDURE — 99406 BEHAV CHNG SMOKING 3-10 MIN: CPT | Performed by: ORTHOPAEDIC SURGERY

## 2025-04-16 PROCEDURE — 6370000000 HC RX 637 (ALT 250 FOR IP)

## 2025-04-16 PROCEDURE — 87075 CULTR BACTERIA EXCEPT BLOOD: CPT

## 2025-04-16 PROCEDURE — 99233 SBSQ HOSP IP/OBS HIGH 50: CPT | Performed by: INTERNAL MEDICINE

## 2025-04-16 PROCEDURE — 3700000000 HC ANESTHESIA ATTENDED CARE: Performed by: ORTHOPAEDIC SURGERY

## 2025-04-16 PROCEDURE — 6370000000 HC RX 637 (ALT 250 FOR IP): Performed by: INTERNAL MEDICINE

## 2025-04-16 PROCEDURE — 3600000002 HC SURGERY LEVEL 2 BASE: Performed by: ORTHOPAEDIC SURGERY

## 2025-04-16 PROCEDURE — 87205 SMEAR GRAM STAIN: CPT

## 2025-04-16 PROCEDURE — 2580000003 HC RX 258: Performed by: NURSE ANESTHETIST, CERTIFIED REGISTERED

## 2025-04-16 PROCEDURE — 83735 ASSAY OF MAGNESIUM: CPT

## 2025-04-16 PROCEDURE — 0QBQ0ZZ EXCISION OF RIGHT TOE PHALANX, OPEN APPROACH: ICD-10-PCS | Performed by: ORTHOPAEDIC SURGERY

## 2025-04-16 PROCEDURE — 3600000012 HC SURGERY LEVEL 2 ADDTL 15MIN: Performed by: ORTHOPAEDIC SURGERY

## 2025-04-16 PROCEDURE — 82550 ASSAY OF CK (CPK): CPT

## 2025-04-16 PROCEDURE — 99223 1ST HOSP IP/OBS HIGH 75: CPT | Performed by: ORTHOPAEDIC SURGERY

## 2025-04-16 PROCEDURE — 87070 CULTURE OTHR SPECIMN AEROBIC: CPT

## 2025-04-16 PROCEDURE — 2580000003 HC RX 258: Performed by: INTERNAL MEDICINE

## 2025-04-16 PROCEDURE — 6360000002 HC RX W HCPCS: Performed by: NURSE ANESTHETIST, CERTIFIED REGISTERED

## 2025-04-16 RX ORDER — ONDANSETRON 2 MG/ML
4 INJECTION INTRAMUSCULAR; INTRAVENOUS
Status: DISCONTINUED | OUTPATIENT
Start: 2025-04-16 | End: 2025-04-16 | Stop reason: HOSPADM

## 2025-04-16 RX ORDER — SODIUM CHLORIDE 0.9 % (FLUSH) 0.9 %
5-40 SYRINGE (ML) INJECTION PRN
Status: DISCONTINUED | OUTPATIENT
Start: 2025-04-16 | End: 2025-04-18 | Stop reason: SDUPTHER

## 2025-04-16 RX ORDER — DEXAMETHASONE SODIUM PHOSPHATE 4 MG/ML
INJECTION, SOLUTION INTRA-ARTICULAR; INTRALESIONAL; INTRAMUSCULAR; INTRAVENOUS; SOFT TISSUE
Status: DISCONTINUED | OUTPATIENT
Start: 2025-04-16 | End: 2025-04-16 | Stop reason: SDUPTHER

## 2025-04-16 RX ORDER — OXYCODONE HYDROCHLORIDE 5 MG/1
5 TABLET ORAL EVERY 4 HOURS PRN
Status: DISCONTINUED | OUTPATIENT
Start: 2025-04-16 | End: 2025-04-21 | Stop reason: SDUPTHER

## 2025-04-16 RX ORDER — INSULIN LISPRO 100 [IU]/ML
0-8 INJECTION, SOLUTION INTRAVENOUS; SUBCUTANEOUS
Status: DISCONTINUED | OUTPATIENT
Start: 2025-04-16 | End: 2025-04-23 | Stop reason: HOSPADM

## 2025-04-16 RX ORDER — ONDANSETRON 2 MG/ML
INJECTION INTRAMUSCULAR; INTRAVENOUS
Status: DISCONTINUED | OUTPATIENT
Start: 2025-04-16 | End: 2025-04-16 | Stop reason: SDUPTHER

## 2025-04-16 RX ORDER — SODIUM CHLORIDE 450 MG/100ML
INJECTION, SOLUTION INTRAVENOUS CONTINUOUS
Status: DISCONTINUED | OUTPATIENT
Start: 2025-04-16 | End: 2025-04-18 | Stop reason: SDUPTHER

## 2025-04-16 RX ORDER — NALOXONE HYDROCHLORIDE 0.4 MG/ML
INJECTION, SOLUTION INTRAMUSCULAR; INTRAVENOUS; SUBCUTANEOUS PRN
Status: DISCONTINUED | OUTPATIENT
Start: 2025-04-16 | End: 2025-04-16 | Stop reason: HOSPADM

## 2025-04-16 RX ORDER — ACETAMINOPHEN 650 MG
TABLET, EXTENDED RELEASE ORAL
Status: DISCONTINUED | OUTPATIENT
Start: 2025-04-16 | End: 2025-04-16 | Stop reason: ALTCHOICE

## 2025-04-16 RX ORDER — LIDOCAINE HYDROCHLORIDE 20 MG/ML
INJECTION, SOLUTION EPIDURAL; INFILTRATION; INTRACAUDAL; PERINEURAL
Status: DISCONTINUED | OUTPATIENT
Start: 2025-04-16 | End: 2025-04-16 | Stop reason: SDUPTHER

## 2025-04-16 RX ORDER — ASPIRIN 325 MG
325 TABLET, DELAYED RELEASE (ENTERIC COATED) ORAL 2 TIMES DAILY
Status: DISCONTINUED | OUTPATIENT
Start: 2025-04-16 | End: 2025-04-16

## 2025-04-16 RX ORDER — SODIUM CHLORIDE 9 MG/ML
INJECTION, SOLUTION INTRAVENOUS
Status: DISCONTINUED | OUTPATIENT
Start: 2025-04-16 | End: 2025-04-16 | Stop reason: SDUPTHER

## 2025-04-16 RX ORDER — MORPHINE SULFATE 2 MG/ML
2 INJECTION, SOLUTION INTRAMUSCULAR; INTRAVENOUS
Status: DISCONTINUED | OUTPATIENT
Start: 2025-04-16 | End: 2025-04-19

## 2025-04-16 RX ORDER — OXYCODONE HYDROCHLORIDE 10 MG/1
10 TABLET ORAL PRN
Status: DISCONTINUED | OUTPATIENT
Start: 2025-04-16 | End: 2025-04-16 | Stop reason: HOSPADM

## 2025-04-16 RX ORDER — GLYCOPYRROLATE 0.2 MG/ML
INJECTION INTRAMUSCULAR; INTRAVENOUS
Status: DISCONTINUED | OUTPATIENT
Start: 2025-04-16 | End: 2025-04-16 | Stop reason: SDUPTHER

## 2025-04-16 RX ORDER — SODIUM CHLORIDE 0.9 % (FLUSH) 0.9 %
5-40 SYRINGE (ML) INJECTION EVERY 12 HOURS SCHEDULED
Status: DISCONTINUED | OUTPATIENT
Start: 2025-04-16 | End: 2025-04-16 | Stop reason: HOSPADM

## 2025-04-16 RX ORDER — FENTANYL CITRATE 50 UG/ML
INJECTION, SOLUTION INTRAMUSCULAR; INTRAVENOUS
Status: DISCONTINUED | OUTPATIENT
Start: 2025-04-16 | End: 2025-04-16 | Stop reason: SDUPTHER

## 2025-04-16 RX ORDER — ROCURONIUM BROMIDE 10 MG/ML
INJECTION, SOLUTION INTRAVENOUS
Status: DISCONTINUED | OUTPATIENT
Start: 2025-04-16 | End: 2025-04-16 | Stop reason: SDUPTHER

## 2025-04-16 RX ORDER — OXYCODONE HYDROCHLORIDE 10 MG/1
10 TABLET ORAL EVERY 4 HOURS PRN
Status: DISCONTINUED | OUTPATIENT
Start: 2025-04-16 | End: 2025-04-21 | Stop reason: SDUPTHER

## 2025-04-16 RX ORDER — PROPOFOL 10 MG/ML
INJECTION, EMULSION INTRAVENOUS
Status: DISCONTINUED | OUTPATIENT
Start: 2025-04-16 | End: 2025-04-16 | Stop reason: SDUPTHER

## 2025-04-16 RX ORDER — SODIUM CHLORIDE 0.9 % (FLUSH) 0.9 %
5-40 SYRINGE (ML) INJECTION EVERY 12 HOURS SCHEDULED
Status: DISCONTINUED | OUTPATIENT
Start: 2025-04-16 | End: 2025-04-18 | Stop reason: SDUPTHER

## 2025-04-16 RX ORDER — SUCCINYLCHOLINE/SOD CL,ISO/PF 200MG/10ML
SYRINGE (ML) INTRAVENOUS
Status: DISCONTINUED | OUTPATIENT
Start: 2025-04-16 | End: 2025-04-16 | Stop reason: SDUPTHER

## 2025-04-16 RX ORDER — OXYCODONE HYDROCHLORIDE 5 MG/1
5 TABLET ORAL PRN
Status: DISCONTINUED | OUTPATIENT
Start: 2025-04-16 | End: 2025-04-16 | Stop reason: HOSPADM

## 2025-04-16 RX ORDER — SODIUM CHLORIDE 9 MG/ML
INJECTION, SOLUTION INTRAVENOUS PRN
Status: DISCONTINUED | OUTPATIENT
Start: 2025-04-16 | End: 2025-04-18 | Stop reason: SDUPTHER

## 2025-04-16 RX ORDER — SODIUM CHLORIDE 9 MG/ML
INJECTION, SOLUTION INTRAVENOUS PRN
Status: DISCONTINUED | OUTPATIENT
Start: 2025-04-16 | End: 2025-04-16 | Stop reason: HOSPADM

## 2025-04-16 RX ORDER — SODIUM CHLORIDE 0.9 % (FLUSH) 0.9 %
5-40 SYRINGE (ML) INJECTION PRN
Status: DISCONTINUED | OUTPATIENT
Start: 2025-04-16 | End: 2025-04-16 | Stop reason: HOSPADM

## 2025-04-16 RX ORDER — INSULIN LISPRO 100 [IU]/ML
2 INJECTION, SOLUTION INTRAVENOUS; SUBCUTANEOUS ONCE
Status: COMPLETED | OUTPATIENT
Start: 2025-04-16 | End: 2025-04-16

## 2025-04-16 RX ADMIN — LIDOCAINE HYDROCHLORIDE 60 MG: 20 INJECTION, SOLUTION EPIDURAL; INFILTRATION; INTRACAUDAL; PERINEURAL at 10:16

## 2025-04-16 RX ADMIN — INSULIN LISPRO 1 UNITS: 100 INJECTION, SOLUTION INTRAVENOUS; SUBCUTANEOUS at 08:30

## 2025-04-16 RX ADMIN — SODIUM CHLORIDE 3000 MG: 9 INJECTION, SOLUTION INTRAVENOUS at 08:34

## 2025-04-16 RX ADMIN — INSULIN LISPRO 4 UNITS: 100 INJECTION, SOLUTION INTRAVENOUS; SUBCUTANEOUS at 18:29

## 2025-04-16 RX ADMIN — PROPOFOL 150 MG: 10 INJECTION, EMULSION INTRAVENOUS at 10:16

## 2025-04-16 RX ADMIN — SODIUM CHLORIDE: 0.45 INJECTION, SOLUTION INTRAVENOUS at 13:07

## 2025-04-16 RX ADMIN — INSULIN LISPRO 1 UNITS: 100 INJECTION, SOLUTION INTRAVENOUS; SUBCUTANEOUS at 13:08

## 2025-04-16 RX ADMIN — ENOXAPARIN SODIUM 40 MG: 100 INJECTION SUBCUTANEOUS at 21:07

## 2025-04-16 RX ADMIN — SODIUM CHLORIDE 3000 MG: 9 INJECTION, SOLUTION INTRAVENOUS at 13:08

## 2025-04-16 RX ADMIN — SODIUM CHLORIDE: 9 INJECTION, SOLUTION INTRAVENOUS at 10:12

## 2025-04-16 RX ADMIN — DEXAMETHASONE SODIUM PHOSPHATE 4 MG: 4 INJECTION, SOLUTION INTRAMUSCULAR; INTRAVENOUS at 10:20

## 2025-04-16 RX ADMIN — HYDROMORPHONE HYDROCHLORIDE 1 MG: 1 INJECTION, SOLUTION INTRAMUSCULAR; INTRAVENOUS; SUBCUTANEOUS at 10:46

## 2025-04-16 RX ADMIN — INSULIN LISPRO 8 UNITS: 100 INJECTION, SOLUTION INTRAVENOUS; SUBCUTANEOUS at 21:07

## 2025-04-16 RX ADMIN — FENTANYL CITRATE 100 MCG: 50 INJECTION INTRAMUSCULAR; INTRAVENOUS at 10:13

## 2025-04-16 RX ADMIN — SODIUM CHLORIDE, PRESERVATIVE FREE 10 ML: 5 INJECTION INTRAVENOUS at 21:07

## 2025-04-16 RX ADMIN — GLYCOPYRROLATE 0.2 MG: 0.2 INJECTION INTRAMUSCULAR; INTRAVENOUS at 10:20

## 2025-04-16 RX ADMIN — INSULIN GLARGINE 20 UNITS: 100 INJECTION, SOLUTION SUBCUTANEOUS at 08:30

## 2025-04-16 RX ADMIN — Medication 120 MG: at 10:16

## 2025-04-16 RX ADMIN — SODIUM CHLORIDE 3000 MG: 9 INJECTION, SOLUTION INTRAVENOUS at 02:17

## 2025-04-16 RX ADMIN — INSULIN LISPRO 2 UNITS: 100 INJECTION, SOLUTION INTRAVENOUS; SUBCUTANEOUS at 09:35

## 2025-04-16 RX ADMIN — DAPTOMYCIN 450 MG: 500 INJECTION, POWDER, LYOPHILIZED, FOR SOLUTION INTRAVENOUS at 18:29

## 2025-04-16 RX ADMIN — ONDANSETRON 4 MG: 2 INJECTION, SOLUTION INTRAMUSCULAR; INTRAVENOUS at 10:20

## 2025-04-16 RX ADMIN — POTASSIUM CHLORIDE 40 MEQ: 1500 TABLET, EXTENDED RELEASE ORAL at 13:11

## 2025-04-16 RX ADMIN — INSULIN LISPRO 5 UNITS: 100 INJECTION, SOLUTION INTRAVENOUS; SUBCUTANEOUS at 18:29

## 2025-04-16 RX ADMIN — SODIUM CHLORIDE 3000 MG: 9 INJECTION, SOLUTION INTRAVENOUS at 21:12

## 2025-04-16 RX ADMIN — ROCURONIUM BROMIDE 10 MG: 10 INJECTION INTRAVENOUS at 10:15

## 2025-04-16 ASSESSMENT — PAIN SCALES - GENERAL
PAINLEVEL_OUTOF10: 0

## 2025-04-16 ASSESSMENT — ENCOUNTER SYMPTOMS: SHORTNESS OF BREATH: 0

## 2025-04-16 ASSESSMENT — LIFESTYLE VARIABLES: SMOKING_STATUS: 1

## 2025-04-16 NOTE — ANESTHESIA PRE PROCEDURE
Department of Anesthesiology  Preprocedure Note       Name:  Reilly Alexander   Age:  24 y.o.  :  2000                                          MRN:  [id number]         Date:  2025      Surgeon: Surgeon(s):  Aly Bourne MD    Procedure: Procedure(s):  INCISION AND DRAINAGE RIGHT FOOT    Medications prior to admission:   Prior to Admission medications    Medication Sig Start Date End Date Taking? Authorizing Provider   glucose monitoring kit (FREESTYLE) monitoring kit 1 kit by Does not apply route daily 19  Yes Tj Potter MD   blood glucose test strips (RELION GLUCOSE TEST STRIPS) strip 1 each by In Vitro route 4 times daily As needed. 19  Yes Tj Potter MD   Insulin Pen Needle (B-D ULTRAFINE III SHORT PEN) 31G X 8 MM MISC 1 each by Does not apply route daily 19  Yes Tj Potter MD       Current medications:    Current Facility-Administered Medications   Medication Dose Route Frequency Provider Last Rate Last Admin   • insulin glargine (LANTUS) injection vial 20 Units  20 Units SubCUTAneous Daily James Irizarry MD   20 Units at 25 0830   • insulin lispro (HUMALOG,ADMELOG) injection vial 5 Units  5 Units SubCUTAneous TID WC James Irizarry MD   5 Units at 04/15/25 1840   • ampicillin-sulbactam (UNASYN) 3,000 mg in sodium chloride 0.9 % 100 mL IVPB (addEASE)  3,000 mg IntraVENous Q6H Erik Moncada  mL/hr at 25 0834 3,000 mg at 25 0834   • insulin lispro (HUMALOG,ADMELOG) injection vial 0-4 Units  0-4 Units SubCUTAneous 4x Daily AC & HS Gricel Hart Jr., MD   1 Units at 25 0830   • glucose chewable tablet 16 g  4 tablet Oral PRN Gricel Hart Jr., MD       • glucagon injection 1 mg  1 mg SubCUTAneous PRN Gricel Hart Jr., MD       • dextrose 10 % infusion   IntraVENous Continuous PRN Gricel Hart Jr., MD       • potassium chloride (KLOR-CON M) extended release tablet 40 mEq  40 mEq Oral PRN James Irizarry MD   40  Partially impaired: cannot see medication labels or newsprint, but can see obstacles in path, and the surrounding layout; can count fingers at arm's length

## 2025-04-16 NOTE — BRIEF OP NOTE
Brief Postoperative Note      Patient: Reilly Alexander  YOB: 2000  MRN: [id number]    Date of Procedure: 4/16/2025    Pre-Op Diagnosis Codes:      * Osteomyelitis of right foot, unspecified type (HCC) [M86.9]    Post-Op Diagnosis: Same       Procedure(s):  INCISION AND DRAINAGE RIGHT FOOT OSTEOMYELITIS    Surgeon(s):  Aly Bourne MD    Assistant:  Surgical Assistant: Darren Ayala    Anesthesia: General    Estimated Blood Loss (mL): Minimal    Complications: None    Specimens:   ID Type Source Tests Collected by Time Destination   1 : 1. RIGHT foot osteomyelitis Tissue Tissue CULTURE, SURGICAL Aly Bourne MD 4/16/2025 1026        Implants:  * No implants in log *      Drains: * No LDAs found *    Findings:  Infection Present At Time Of Surgery (PATOS) (choose all levels that have infection present):  - Deep Infection (muscle/fascia) present as evidenced by pus  Other Findings: Same.    Electronically signed by Aly Bourne MD on 4/16/2025 at 11:24 AM

## 2025-04-16 NOTE — CONSULTS
[facility] Orthopedic Surgery  Consult Note         This patient is seen in consultation at the request of James Johnson MD     Reason for Consult:  Right foot pain/ 5th MT osteomyelitis with deep diabetic foot infection.    CHIEF COMPLAINT:  Right foot pain/ 5th MT osteomyelitis with deep diabetic foot infection.    History Obtained From:  patient, electronic medical record    HISTORY OF PRESENT ILLNESS:    Mr. Alexander 24 y.o.  male type I diabetic not taking treatment for insurance issues seen today at [facility] for evaluation of a right foot pain and swelling for 2 months with no known injury but got worse over the weekend and got admitted to [place] ICU for DKA and foot infection.   He is complaining of lateral right foot pain and swelling.  This is better with elevation and worse with bearing any wt.  The pain is sharp and not radiating. No other complaint.     Past Medical History:    History reviewed. No pertinent past medical history.    Past Surgical History:    History reviewed. No pertinent surgical history.    Medications prior to admission:   Prior to Admission medications    Medication Sig Start Date End Date Taking? Authorizing Provider   glucose monitoring kit (FREESTYLE) monitoring kit 1 kit by Does not apply route daily 5/17/19  Yes Tj Potter MD   blood glucose test strips (RELION GLUCOSE TEST STRIPS) strip 1 each by In Vitro route 4 times daily As needed. 5/17/19  Yes Tj Potter MD   Insulin Pen Needle (B-D ULTRAFINE III SHORT PEN) 31G X 8 MM MISC 1 each by Does not apply route daily 5/2/19  Yes Tj Potter MD       Current Medications:   Current Facility-Administered Medications: insulin glargine (LANTUS) injection vial 20 Units, 20 Units, SubCUTAneous, Daily  insulin lispro (HUMALOG,ADMELOG) injection vial 5 Units, 5 Units, SubCUTAneous, TID WC  ampicillin-sulbactam (UNASYN) 3,000 mg in sodium chloride 0.9 % 100 mL IVPB (addEASE), 3,000 mg, IntraVENous, 
Clinical Pharmacy Note  Vancomycin Consult    Pharmacy consult received for one-time dose of vancomycin in the Emergency Department per [facility].    Ht Readings from Last 1 Encounters:   04/13/25 1.88 m (6' 2\")        Wt Readings from Last 1 Encounters:   04/13/25 76.7 kg (169 lb 1.5 oz)         Assessment/Plan:  Vancomycin 1500 mg x 1 in ED.  If vancomycin is to continue on admission and pharmacy is to manage dosing, please re-consult with admission orders.    Keerthi Hall [facility], PharmD, 4/13/2025 1:29 PM      
Clinical Pharmacy Note  Vancomycin Consult    Reilly Alexander is a 24 y.o. male ordered vancomycin for diabetic foot infection; consult received from Dr. Irizarry to manage therapy. Also receiving Unasyn.    Allergies:  Keflex [cephalexin] and Sulfamethoxazole-trimethoprim     Temp max:  Temp (24hrs), Av.9 °F (36.6 °C), Min:97.8 °F (36.6 °C), Max:97.9 °F (36.6 °C)      Recent Labs     25  1255   WBC 16.4*       Recent Labs     25  1255 25  1628   BUN 8 8   CREATININE 0.8* 0.7*         Intake/Output Summary (Last 24 hours) at 2025  Last data filed at 2025 1900  Gross per 24 hour   Intake 720.58 ml   Output --   Net 720.58 ml       Culture Results:  Pending    Ht Readings from Last 1 Encounters:   25 1.88 m (6' 2\")        Wt Readings from Last 1 Encounters:   25 76.4 kg (168 lb 6.9 oz)         Estimated Creatinine Clearance: 173 mL/min (A) (based on SCr of 0.7 mg/dL (L)).    Assessment/Plan:  Day # 1 of vancomycin.  Vancomycin 1250 mg IV every 8 hours.    Goal -600  Predicted AUC (24-48 hours): 497 mg/L*hr      Thank you for the consult.     E[facility]  2025 8:49 PM    
[facility]  Consultation Note    4/15/2025 1:47 PM    Chief Complaint: Right Foot Wound     Reason for Consult: Right Foot Wound    History of Present Illness:  Patient is a 24 y.o. male who presented to the ED on 2025 with complaints of Right Foot Wound. He has a significant PMH of DM I for which he believes he was diagnosed with in 2018. The patient tells me his diabetes has essentially been untreated since he was diagnosed in 2018. He tells me he has had a wound to his Right Foot which started out as a callus about 2 months ago. Over the past week or so, his foot became worse with blistering and he came to the ED for further evaluation. He reports chills which he believes was related to his DKA and has improved since admission. We are consulted to evaluate the patient for Right Foot Wound. At present, he is seen resting in bed, he is alert and oriented and appears to be in stable condition.    Review of Systems  Review of Systems   Constitutional:  Positive for chills (Improved since admission).   HENT: Negative.     Respiratory: Negative.     Cardiovascular: Negative.    Gastrointestinal: Negative.    Musculoskeletal:  Positive for gait problem (Difficulty walking d/t Right Foot Wound).   Skin:  Positive for wound (Right Foot).   Hematological: Negative.    Psychiatric/Behavioral: Negative.          No past medical history on file.    No past surgical history on file.    Allergies   Allergen Reactions    Keflex [Cephalexin]     Sulfamethoxazole-Trimethoprim Hives     rash       Social History     Socioeconomic History    Marital status: Single     Spouse name: Not on file    Number of children: 0    Years of education: Not on file    Highest education level: Not on file   Occupational History    Occupation: Enlisting in [employer]   Tobacco Use    Smoking status: Former     Current packs/day: 0.00     Types: Cigarettes     Quit date: 2015     Years since quittin.7    
[facility] Orthopedic Surgery  Consult Note    This patient is seen in consultation at the request of SHALA Pascual    Reason for Consult:  right foot infection    CHIEF COMPLAINT:  right foot infection    History Obtained From:  patient, electronic medical record    HISTORY OF PRESENT ILLNESS:    The patient is a 24 y.o. male who presents with right foot infection. He reports he has diabetes but does not manage well. He has no insurance. He arrived in ER with right foot infection and in DKA (glucose 411 on admission)and was admitted to ICU. Pain is described in right foot and with the intensity of moderate. Pain is described as tingling, soreness with weightbearing mostly he says. . He is alert and oriented in no distress. No other complaints  Past Medical History:    No past medical history on file.    Past Surgical History:    No past surgical history on file.    Social History     Tobacco Use    Smoking status: Former     Current packs/day: 0.00     Types: Cigarettes     Quit date: 2015     Years since quittin.7    Smokeless tobacco: Never   Substance Use Topics    Alcohol use: Never       Family History   Problem Relation Age of Onset    Hypertension Mother     Diabetes Mother     Cancer Father         Thyroid    Diabetes Father     No Known Problems Brother     No Known Problems Brother            Current Medications:   Current Facility-Administered Medications: insulin glargine (LANTUS) injection vial 20 Units, 20 Units, SubCUTAneous, Daily  insulin lispro (HUMALOG,ADMELOG) injection vial 5 Units, 5 Units, SubCUTAneous, TID WC  insulin lispro (HUMALOG,ADMELOG) injection vial 0-4 Units, 0-4 Units, SubCUTAneous, 4x Daily AC & HS  glucose chewable tablet 16 g, 4 tablet, Oral, PRN  glucagon injection 1 mg, 1 mg, SubCUTAneous, PRN  dextrose 10 % infusion, , IntraVENous, Continuous PRN  potassium chloride (KLOR-CON M) extended release tablet 40 mEq, 40 mEq, Oral, PRN **OR** potassium bicarb-citric acid (EFFER-K) 
indicated unable to take insulin secondary to lack of insurance will likely need  help and medication assistance      Labs, Microbiology, Radiology and pertinent results from current hospitalization and care every where were reviewed by me as a part of the consultation.    PLAN :  Current DKA  Continue IV Unasyn 3 g every 6 hours  DC IV vancomycin  Unable to get good levels due to his age  Start IV daptomycin x 450 mg Q 24 hr  Check wound cultures  Check blood cultures  Trend ESR  Trend CRP  Per blood glucose  Podiatry consultation pending  MRI of the right foot with contrast  Will need  help for medication assistance  Quit smoking  Diabetic education  Medication compliance    Discussed with patient/Family and Nursing     Medical Decision Making:  The following items were considered in medical decision making:  Discussion of patient care with other providers  Reviewed clinical lab tests  Reviewed radiology tests  Reviewed other diagnostic tests/interventions  Independent review of radiologic images  Independent review of  Microbiology cultures and other micro tests reviewed       Risk of Complications/Morbidity: High      Illness(es)/ Infection present that pose threat to bodily function.   There is potential for severe exacerbation of infection/side effects of treatment.  Therapy requires intensive monitoring for antimicrobial agent toxicity.  Review of Antibiotic resistance patterns and lab results  Management decisions including changes in Antimicrobial therapy and infection control strategies  Coordination with Micro lab, public health agencies or interdisciplinary teams  Patient is critically ill and has a potentially life threatening infection that poses threat to life/bodily function.   There is potential for worsening infection/ sudden clinically significant or life-threatening deterioration in the patient's condition without appropriate antimicrobial therapy.  Complex medical

## 2025-04-16 NOTE — CARE COORDINATION
LSW notes consult for \"placement\". PT/OT ordered will await their recommendations. Patient also with uncontrolled diabetes due to being uninsured. Resources have been given to patient for [facility]. [facility] also notes that ID is following and patient is currently on IV ABX.  There will be barriers to discharge due to patient being uninsured.   Electronically signed by RADHA Chaudhry on 4/16/2025 at 4:36 PM

## 2025-04-16 NOTE — ANESTHESIA POSTPROCEDURE EVALUATION
Department of Anesthesiology  Postprocedure Note    Patient: Reilly Alexander  MRN: [id number]  YOB: 2000  Date of evaluation: 4/16/2025    Procedure Summary       Date: 04/16/25 Room / Location: [address]    Anesthesia Start: 1012 Anesthesia Stop: 1057    Procedure: INCISION AND DRAINAGE RIGHT FOOT OSTEOMYELITIS (Right: Foot) Diagnosis:       Osteomyelitis of right foot, unspecified type (HCC)      (Osteomyelitis of right foot, unspecified type (HCC) [M86.9])    Surgeons: Aly Bourne MD Responsible Provider: Roe Bone MD    Anesthesia Type: General ASA Status: 3            Anesthesia Type: General    Lois Phase I: Lois Score: 5    Lois Phase II:      Anesthesia Post Evaluation    Patient location during evaluation: PACU  Level of consciousness: awake and alert  Airway patency: patent  Nausea & Vomiting: no nausea and no vomiting  Cardiovascular status: blood pressure returned to baseline  Respiratory status: acceptable  Hydration status: euvolemic  Comments: Postoperative Anesthesia Note    Name:    Reilly Alexander  MRN:      [id number]    Patient Vitals in the past 12 hrs:  04/16/25 1115, BP:103/66, Pulse:94, Resp:15, SpO2:95 %  04/16/25 1110, BP:103/64, Pulse:93, Resp:15, SpO2:99 %  04/16/25 1105, BP:102/62, Pulse:99, Resp:18, SpO2:97 %  04/16/25 1100, BP:(!) 87/57, Temp:97.8 °F (36.6 °C), Temp src:Temporal, Pulse:94, Resp:12, SpO2:100 %  04/16/25 0905, BP:130/89, Temp:98.5 °F (36.9 °C), Pulse:(!) 104, Resp:17, SpO2:100 %  04/16/25 0752, BP:(!) 135/94, Temp:97.9 °F (36.6 °C), Temp src:Oral, Pulse:(!) 111, Resp:18, SpO2:99 %  04/16/25 0744, Resp:17, SpO2:98 %  04/16/25 0441, Weight:83.5 kg (184 lb 1.4 oz)     LABS:    CBC  Lab Results       Component                Value               Date/Time                  WBC                      6.9                 04/15/2025 04:14 AM        HGB                      11.2 (L)            04/15/2025 04:14 AM

## 2025-04-16 NOTE — OP NOTE
[facility]          [address] [place] [address]                            OPERATIVE REPORT      PATIENT NAME: WILLIAN FIGUEROA                : 2000  MED REC NO: [id number]                      ROOM: OR  ACCOUNT NO: [id number]                       ADMIT DATE: 2025  PROVIDER: Aly Bourne MD      DATE OF PROCEDURE:  2025    SURGEON:  Aly Bourne MD    ASSISTANT:  Darren surgical assistant.    PREOPERATIVE DIAGNOSIS:  Right foot deep infection with osteomyelitis.    POSTOPERATIVE DIAGNOSIS:  Right foot deep infection with osteomyelitis.    PROCEDURE DONE:  Incision and drainage right foot osteomyelitis bone cortex right foot 5th metatarsal with excisional debridement of deep infection multiple areas.    ANESTHESIA:  General anesthesia.    ESTIMATED BLOOD LOSS:  Minimal.    COMPLICATIONS:  None.    SPECIMEN:  Swab for culture and sensitivity right foot deep abscess with necrotic tissue.    FINDINGS:  Deep infection right foot with necrotic tissue involving skin, subcutaneous tissue down to the fascia with osteomyelitis of the 5th metatarsal.    INDICATION:  This is a 24-year-old white male with type 1 diabetes uncontrolled as he is not taking medicine because of insurance issue, has been complaining of right foot pain for about 2 months.  He reported a wound that has been on and off since it started.  He started getting more swelling and pain and presented to [facility] on 2025 with a picture for DKA and infection of the right foot.  Admitted to the ICU and I was consulted for his condition.  All risks, benefits, and alternatives discussed with the patient and he agreed to proceed with surgical treatment.  He got an MRI that showed osteomyelitis of the right 5th metatarsal as well as deep infection with abscess.    DESCRIPTION OF PROCEDURE:  The patient's right foot was marked.  He was already on IV antibiotic.  The patient

## 2025-04-17 LAB
ANION GAP SERPL CALCULATED.3IONS-SCNC: 12 MMOL/L (ref 3–16)
BACTERIA BLD CULT ORG #2: NORMAL
BACTERIA BLD CULT: NORMAL
BUN SERPL-MCNC: 6 MG/DL (ref 7–20)
CALCIUM SERPL-MCNC: 8.5 MG/DL (ref 8.3–10.6)
CHLORIDE SERPL-SCNC: 102 MMOL/L (ref 99–110)
CO2 SERPL-SCNC: 23 MMOL/L (ref 21–32)
CREAT SERPL-MCNC: 0.5 MG/DL (ref 0.9–1.3)
GFR SERPLBLD CREATININE-BSD FMLA CKD-EPI: >90 ML/MIN/{1.73_M2}
GLUCOSE BLD-MCNC: 236 MG/DL (ref 70–99)
GLUCOSE BLD-MCNC: 264 MG/DL (ref 70–99)
GLUCOSE BLD-MCNC: 304 MG/DL (ref 70–99)
GLUCOSE BLD-MCNC: 389 MG/DL (ref 70–99)
GLUCOSE SERPL-MCNC: 371 MG/DL (ref 70–99)
HCT VFR BLD AUTO: 33.5 % (ref 40.5–52.5)
HGB BLD-MCNC: 11.7 G/DL (ref 13.5–17.5)
MAGNESIUM SERPL-MCNC: 2.03 MG/DL (ref 1.8–2.4)
PERFORMED ON: ABNORMAL
PHOSPHATE SERPL-MCNC: 3.1 MG/DL (ref 2.5–4.9)
POTASSIUM SERPL-SCNC: 4.1 MMOL/L (ref 3.5–5.1)
SODIUM SERPL-SCNC: 137 MMOL/L (ref 136–145)

## 2025-04-17 PROCEDURE — 97162 PT EVAL MOD COMPLEX 30 MIN: CPT

## 2025-04-17 PROCEDURE — 1200000000 HC SEMI PRIVATE

## 2025-04-17 PROCEDURE — 84100 ASSAY OF PHOSPHORUS: CPT

## 2025-04-17 PROCEDURE — 36415 COLL VENOUS BLD VENIPUNCTURE: CPT

## 2025-04-17 PROCEDURE — 97530 THERAPEUTIC ACTIVITIES: CPT

## 2025-04-17 PROCEDURE — 97165 OT EVAL LOW COMPLEX 30 MIN: CPT

## 2025-04-17 PROCEDURE — 80048 BASIC METABOLIC PNL TOTAL CA: CPT

## 2025-04-17 PROCEDURE — 2500000003 HC RX 250 WO HCPCS: Performed by: ORTHOPAEDIC SURGERY

## 2025-04-17 PROCEDURE — 99233 SBSQ HOSP IP/OBS HIGH 50: CPT | Performed by: INTERNAL MEDICINE

## 2025-04-17 PROCEDURE — 2580000003 HC RX 258: Performed by: ORTHOPAEDIC SURGERY

## 2025-04-17 PROCEDURE — 6360000002 HC RX W HCPCS: Performed by: ORTHOPAEDIC SURGERY

## 2025-04-17 PROCEDURE — 97116 GAIT TRAINING THERAPY: CPT

## 2025-04-17 PROCEDURE — 94760 N-INVAS EAR/PLS OXIMETRY 1: CPT

## 2025-04-17 PROCEDURE — 6370000000 HC RX 637 (ALT 250 FOR IP)

## 2025-04-17 PROCEDURE — 6370000000 HC RX 637 (ALT 250 FOR IP): Performed by: ORTHOPAEDIC SURGERY

## 2025-04-17 PROCEDURE — 85018 HEMOGLOBIN: CPT

## 2025-04-17 PROCEDURE — 85014 HEMATOCRIT: CPT

## 2025-04-17 PROCEDURE — 83735 ASSAY OF MAGNESIUM: CPT

## 2025-04-17 PROCEDURE — 6370000000 HC RX 637 (ALT 250 FOR IP): Performed by: INTERNAL MEDICINE

## 2025-04-17 RX ORDER — INSULIN GLARGINE 100 [IU]/ML
6 INJECTION, SOLUTION SUBCUTANEOUS ONCE
Status: COMPLETED | OUTPATIENT
Start: 2025-04-17 | End: 2025-04-17

## 2025-04-17 RX ORDER — INSULIN LISPRO 100 [IU]/ML
10 INJECTION, SOLUTION INTRAVENOUS; SUBCUTANEOUS
Status: DISCONTINUED | OUTPATIENT
Start: 2025-04-17 | End: 2025-04-19

## 2025-04-17 RX ORDER — INSULIN GLARGINE 100 [IU]/ML
25 INJECTION, SOLUTION SUBCUTANEOUS DAILY
Status: DISCONTINUED | OUTPATIENT
Start: 2025-04-17 | End: 2025-04-17

## 2025-04-17 RX ORDER — INSULIN GLARGINE 100 [IU]/ML
25 INJECTION, SOLUTION SUBCUTANEOUS DAILY
Status: DISCONTINUED | OUTPATIENT
Start: 2025-04-18 | End: 2025-04-19

## 2025-04-17 RX ADMIN — SODIUM CHLORIDE 3000 MG: 9 INJECTION, SOLUTION INTRAVENOUS at 09:35

## 2025-04-17 RX ADMIN — INSULIN LISPRO 8 UNITS: 100 INJECTION, SOLUTION INTRAVENOUS; SUBCUTANEOUS at 09:25

## 2025-04-17 RX ADMIN — ENOXAPARIN SODIUM 40 MG: 100 INJECTION SUBCUTANEOUS at 20:53

## 2025-04-17 RX ADMIN — SODIUM CHLORIDE, PRESERVATIVE FREE 10 ML: 5 INJECTION INTRAVENOUS at 20:53

## 2025-04-17 RX ADMIN — SODIUM CHLORIDE 3000 MG: 9 INJECTION, SOLUTION INTRAVENOUS at 02:34

## 2025-04-17 RX ADMIN — INSULIN LISPRO 10 UNITS: 100 INJECTION, SOLUTION INTRAVENOUS; SUBCUTANEOUS at 18:03

## 2025-04-17 RX ADMIN — SODIUM CHLORIDE 3000 MG: 9 INJECTION, SOLUTION INTRAVENOUS at 20:53

## 2025-04-17 RX ADMIN — SODIUM CHLORIDE, PRESERVATIVE FREE 10 ML: 5 INJECTION INTRAVENOUS at 09:53

## 2025-04-17 RX ADMIN — INSULIN LISPRO 6 UNITS: 100 INJECTION, SOLUTION INTRAVENOUS; SUBCUTANEOUS at 18:03

## 2025-04-17 RX ADMIN — INSULIN LISPRO 2 UNITS: 100 INJECTION, SOLUTION INTRAVENOUS; SUBCUTANEOUS at 21:03

## 2025-04-17 RX ADMIN — INSULIN GLARGINE 6 UNITS: 100 INJECTION, SOLUTION SUBCUTANEOUS at 09:47

## 2025-04-17 RX ADMIN — SODIUM CHLORIDE: 0.45 INJECTION, SOLUTION INTRAVENOUS at 18:03

## 2025-04-17 RX ADMIN — INSULIN LISPRO 5 UNITS: 100 INJECTION, SOLUTION INTRAVENOUS; SUBCUTANEOUS at 09:53

## 2025-04-17 RX ADMIN — SODIUM CHLORIDE: 0.45 INJECTION, SOLUTION INTRAVENOUS at 02:35

## 2025-04-17 RX ADMIN — INSULIN LISPRO 4 UNITS: 100 INJECTION, SOLUTION INTRAVENOUS; SUBCUTANEOUS at 13:49

## 2025-04-17 RX ADMIN — INSULIN GLARGINE 20 UNITS: 100 INJECTION, SOLUTION SUBCUTANEOUS at 09:53

## 2025-04-17 RX ADMIN — SODIUM CHLORIDE 3000 MG: 9 INJECTION, SOLUTION INTRAVENOUS at 13:49

## 2025-04-17 RX ADMIN — INSULIN LISPRO 10 UNITS: 100 INJECTION, SOLUTION INTRAVENOUS; SUBCUTANEOUS at 13:49

## 2025-04-17 ASSESSMENT — PAIN SCALES - GENERAL: PAINLEVEL_OUTOF10: 0

## 2025-04-18 ENCOUNTER — ANESTHESIA (OUTPATIENT)
Dept: OPERATING ROOM | Age: 25
End: 2025-04-18

## 2025-04-18 ENCOUNTER — ANESTHESIA EVENT (OUTPATIENT)
Dept: OPERATING ROOM | Age: 25
End: 2025-04-18

## 2025-04-18 LAB
ANION GAP SERPL CALCULATED.3IONS-SCNC: 7 MMOL/L (ref 3–16)
BUN SERPL-MCNC: 7 MG/DL (ref 7–20)
CALCIUM SERPL-MCNC: 8.7 MG/DL (ref 8.3–10.6)
CHLORIDE SERPL-SCNC: 102 MMOL/L (ref 99–110)
CO2 SERPL-SCNC: 29 MMOL/L (ref 21–32)
CREAT SERPL-MCNC: 0.3 MG/DL (ref 0.9–1.3)
GFR SERPLBLD CREATININE-BSD FMLA CKD-EPI: >90 ML/MIN/{1.73_M2}
GLUCOSE BLD-MCNC: 198 MG/DL (ref 70–99)
GLUCOSE BLD-MCNC: 223 MG/DL (ref 70–99)
GLUCOSE BLD-MCNC: 249 MG/DL (ref 70–99)
GLUCOSE BLD-MCNC: 268 MG/DL (ref 70–99)
GLUCOSE BLD-MCNC: 344 MG/DL (ref 70–99)
GLUCOSE BLD-MCNC: 382 MG/DL (ref 70–99)
GLUCOSE SERPL-MCNC: 282 MG/DL (ref 70–99)
MAGNESIUM SERPL-MCNC: 1.79 MG/DL (ref 1.8–2.4)
PERFORMED ON: ABNORMAL
PHOSPHATE SERPL-MCNC: 3.7 MG/DL (ref 2.5–4.9)
POTASSIUM SERPL-SCNC: 3.5 MMOL/L (ref 3.5–5.1)
SODIUM SERPL-SCNC: 138 MMOL/L (ref 136–145)

## 2025-04-18 PROCEDURE — 9990000010 HC NO CHARGE VISIT

## 2025-04-18 PROCEDURE — 94760 N-INVAS EAR/PLS OXIMETRY 1: CPT

## 2025-04-18 PROCEDURE — 6370000000 HC RX 637 (ALT 250 FOR IP): Performed by: ORTHOPAEDIC SURGERY

## 2025-04-18 PROCEDURE — 2580000003 HC RX 258: Performed by: ORTHOPAEDIC SURGERY

## 2025-04-18 PROCEDURE — 2500000003 HC RX 250 WO HCPCS: Performed by: NURSE ANESTHETIST, CERTIFIED REGISTERED

## 2025-04-18 PROCEDURE — 6360000002 HC RX W HCPCS: Performed by: ORTHOPAEDIC SURGERY

## 2025-04-18 PROCEDURE — 6370000000 HC RX 637 (ALT 250 FOR IP)

## 2025-04-18 PROCEDURE — 7100000000 HC PACU RECOVERY - FIRST 15 MIN: Performed by: ORTHOPAEDIC SURGERY

## 2025-04-18 PROCEDURE — 3700000000 HC ANESTHESIA ATTENDED CARE: Performed by: ORTHOPAEDIC SURGERY

## 2025-04-18 PROCEDURE — 3600000012 HC SURGERY LEVEL 2 ADDTL 15MIN: Performed by: ORTHOPAEDIC SURGERY

## 2025-04-18 PROCEDURE — 83735 ASSAY OF MAGNESIUM: CPT

## 2025-04-18 PROCEDURE — 36415 COLL VENOUS BLD VENIPUNCTURE: CPT

## 2025-04-18 PROCEDURE — 1200000000 HC SEMI PRIVATE

## 2025-04-18 PROCEDURE — 3600000002 HC SURGERY LEVEL 2 BASE: Performed by: ORTHOPAEDIC SURGERY

## 2025-04-18 PROCEDURE — 6370000000 HC RX 637 (ALT 250 FOR IP): Performed by: INTERNAL MEDICINE

## 2025-04-18 PROCEDURE — 3700000001 HC ADD 15 MINUTES (ANESTHESIA): Performed by: ORTHOPAEDIC SURGERY

## 2025-04-18 PROCEDURE — 84100 ASSAY OF PHOSPHORUS: CPT

## 2025-04-18 PROCEDURE — 7100000001 HC PACU RECOVERY - ADDTL 15 MIN: Performed by: ORTHOPAEDIC SURGERY

## 2025-04-18 PROCEDURE — 80048 BASIC METABOLIC PNL TOTAL CA: CPT

## 2025-04-18 PROCEDURE — 0Q9N0ZZ DRAINAGE OF RIGHT METATARSAL, OPEN APPROACH: ICD-10-PCS | Performed by: ORTHOPAEDIC SURGERY

## 2025-04-18 PROCEDURE — 2500000003 HC RX 250 WO HCPCS: Performed by: ORTHOPAEDIC SURGERY

## 2025-04-18 PROCEDURE — 2709999900 HC NON-CHARGEABLE SUPPLY: Performed by: ORTHOPAEDIC SURGERY

## 2025-04-18 PROCEDURE — 99233 SBSQ HOSP IP/OBS HIGH 50: CPT | Performed by: INTERNAL MEDICINE

## 2025-04-18 PROCEDURE — 6360000002 HC RX W HCPCS: Performed by: NURSE ANESTHETIST, CERTIFIED REGISTERED

## 2025-04-18 PROCEDURE — 28003 TREATMENT OF FOOT INFECTION: CPT | Performed by: ORTHOPAEDIC SURGERY

## 2025-04-18 RX ORDER — SUCCINYLCHOLINE/SOD CL,ISO/PF 200MG/10ML
SYRINGE (ML) INTRAVENOUS
Status: DISCONTINUED | OUTPATIENT
Start: 2025-04-18 | End: 2025-04-18 | Stop reason: SDUPTHER

## 2025-04-18 RX ORDER — SODIUM CHLORIDE 9 MG/ML
INJECTION, SOLUTION INTRAVENOUS PRN
Status: DISCONTINUED | OUTPATIENT
Start: 2025-04-18 | End: 2025-04-18 | Stop reason: HOSPADM

## 2025-04-18 RX ORDER — SODIUM CHLORIDE 0.9 % (FLUSH) 0.9 %
5-40 SYRINGE (ML) INJECTION EVERY 12 HOURS SCHEDULED
Status: DISCONTINUED | OUTPATIENT
Start: 2025-04-18 | End: 2025-04-18 | Stop reason: HOSPADM

## 2025-04-18 RX ORDER — PHENYLEPHRINE HCL IN 0.9% NACL 1 MG/10 ML
SYRINGE (ML) INTRAVENOUS
Status: DISCONTINUED | OUTPATIENT
Start: 2025-04-18 | End: 2025-04-18 | Stop reason: SDUPTHER

## 2025-04-18 RX ORDER — NALOXONE HYDROCHLORIDE 0.4 MG/ML
INJECTION, SOLUTION INTRAMUSCULAR; INTRAVENOUS; SUBCUTANEOUS PRN
Status: DISCONTINUED | OUTPATIENT
Start: 2025-04-18 | End: 2025-04-18 | Stop reason: HOSPADM

## 2025-04-18 RX ORDER — ONDANSETRON 2 MG/ML
INJECTION INTRAMUSCULAR; INTRAVENOUS
Status: DISCONTINUED | OUTPATIENT
Start: 2025-04-18 | End: 2025-04-18 | Stop reason: SDUPTHER

## 2025-04-18 RX ORDER — SODIUM CHLORIDE 0.9 % (FLUSH) 0.9 %
5-40 SYRINGE (ML) INJECTION PRN
Status: DISCONTINUED | OUTPATIENT
Start: 2025-04-18 | End: 2025-04-18 | Stop reason: HOSPADM

## 2025-04-18 RX ORDER — FENTANYL CITRATE 50 UG/ML
25 INJECTION, SOLUTION INTRAMUSCULAR; INTRAVENOUS EVERY 5 MIN PRN
Status: DISCONTINUED | OUTPATIENT
Start: 2025-04-18 | End: 2025-04-18 | Stop reason: HOSPADM

## 2025-04-18 RX ORDER — SODIUM CHLORIDE 0.9 % (FLUSH) 0.9 %
5-40 SYRINGE (ML) INJECTION PRN
Status: DISCONTINUED | OUTPATIENT
Start: 2025-04-18 | End: 2025-04-23 | Stop reason: HOSPADM

## 2025-04-18 RX ORDER — ONDANSETRON 2 MG/ML
4 INJECTION INTRAMUSCULAR; INTRAVENOUS
Status: DISCONTINUED | OUTPATIENT
Start: 2025-04-18 | End: 2025-04-18 | Stop reason: HOSPADM

## 2025-04-18 RX ORDER — SODIUM CHLORIDE 450 MG/100ML
INJECTION, SOLUTION INTRAVENOUS CONTINUOUS
Status: DISCONTINUED | OUTPATIENT
Start: 2025-04-18 | End: 2025-04-21 | Stop reason: SDUPTHER

## 2025-04-18 RX ORDER — DEXMEDETOMIDINE HYDROCHLORIDE 100 UG/ML
INJECTION, SOLUTION INTRAVENOUS
Status: DISCONTINUED | OUTPATIENT
Start: 2025-04-18 | End: 2025-04-18 | Stop reason: SDUPTHER

## 2025-04-18 RX ORDER — INSULIN LISPRO 100 [IU]/ML
4 INJECTION, SOLUTION INTRAVENOUS; SUBCUTANEOUS ONCE
Status: COMPLETED | OUTPATIENT
Start: 2025-04-18 | End: 2025-04-18

## 2025-04-18 RX ORDER — DEXAMETHASONE SODIUM PHOSPHATE 4 MG/ML
INJECTION, SOLUTION INTRA-ARTICULAR; INTRALESIONAL; INTRAMUSCULAR; INTRAVENOUS; SOFT TISSUE
Status: DISCONTINUED | OUTPATIENT
Start: 2025-04-18 | End: 2025-04-18 | Stop reason: SDUPTHER

## 2025-04-18 RX ORDER — DIPHENHYDRAMINE HYDROCHLORIDE 50 MG/ML
12.5 INJECTION, SOLUTION INTRAMUSCULAR; INTRAVENOUS
Status: DISCONTINUED | OUTPATIENT
Start: 2025-04-18 | End: 2025-04-18 | Stop reason: HOSPADM

## 2025-04-18 RX ORDER — PROPOFOL 10 MG/ML
INJECTION, EMULSION INTRAVENOUS
Status: DISCONTINUED | OUTPATIENT
Start: 2025-04-18 | End: 2025-04-18 | Stop reason: SDUPTHER

## 2025-04-18 RX ORDER — OXYCODONE HYDROCHLORIDE 5 MG/1
5 TABLET ORAL
Status: DISCONTINUED | OUTPATIENT
Start: 2025-04-18 | End: 2025-04-18 | Stop reason: HOSPADM

## 2025-04-18 RX ORDER — FENTANYL CITRATE 50 UG/ML
INJECTION, SOLUTION INTRAMUSCULAR; INTRAVENOUS
Status: DISCONTINUED | OUTPATIENT
Start: 2025-04-18 | End: 2025-04-18 | Stop reason: SDUPTHER

## 2025-04-18 RX ORDER — ACETAMINOPHEN 325 MG/1
650 TABLET ORAL
Status: DISCONTINUED | OUTPATIENT
Start: 2025-04-18 | End: 2025-04-18 | Stop reason: HOSPADM

## 2025-04-18 RX ORDER — LABETALOL HYDROCHLORIDE 5 MG/ML
5 INJECTION, SOLUTION INTRAVENOUS
Status: DISCONTINUED | OUTPATIENT
Start: 2025-04-18 | End: 2025-04-18 | Stop reason: HOSPADM

## 2025-04-18 RX ORDER — SODIUM CHLORIDE 0.9 % (FLUSH) 0.9 %
5-40 SYRINGE (ML) INJECTION EVERY 12 HOURS SCHEDULED
Status: DISCONTINUED | OUTPATIENT
Start: 2025-04-18 | End: 2025-04-23 | Stop reason: HOSPADM

## 2025-04-18 RX ORDER — MAGNESIUM HYDROXIDE 1200 MG/15ML
LIQUID ORAL CONTINUOUS PRN
Status: COMPLETED | OUTPATIENT
Start: 2025-04-18 | End: 2025-04-18

## 2025-04-18 RX ORDER — MEPERIDINE HYDROCHLORIDE 25 MG/ML
12.5 INJECTION INTRAMUSCULAR; INTRAVENOUS; SUBCUTANEOUS EVERY 5 MIN PRN
Status: DISCONTINUED | OUTPATIENT
Start: 2025-04-18 | End: 2025-04-18 | Stop reason: HOSPADM

## 2025-04-18 RX ORDER — LIDOCAINE HYDROCHLORIDE 20 MG/ML
INJECTION, SOLUTION EPIDURAL; INFILTRATION; INTRACAUDAL; PERINEURAL
Status: DISCONTINUED | OUTPATIENT
Start: 2025-04-18 | End: 2025-04-18 | Stop reason: SDUPTHER

## 2025-04-18 RX ORDER — SODIUM CHLORIDE 9 MG/ML
INJECTION, SOLUTION INTRAVENOUS PRN
Status: DISCONTINUED | OUTPATIENT
Start: 2025-04-18 | End: 2025-04-23 | Stop reason: HOSPADM

## 2025-04-18 RX ADMIN — ONDANSETRON 4 MG: 2 INJECTION INTRAMUSCULAR; INTRAVENOUS at 10:27

## 2025-04-18 RX ADMIN — INSULIN LISPRO 6 UNITS: 100 INJECTION, SOLUTION INTRAVENOUS; SUBCUTANEOUS at 17:42

## 2025-04-18 RX ADMIN — Medication 140 MG: at 10:27

## 2025-04-18 RX ADMIN — LIDOCAINE HYDROCHLORIDE 100 MG: 20 INJECTION, SOLUTION EPIDURAL; INFILTRATION; INTRACAUDAL; PERINEURAL at 10:27

## 2025-04-18 RX ADMIN — INSULIN LISPRO 10 UNITS: 100 INJECTION, SOLUTION INTRAVENOUS; SUBCUTANEOUS at 17:42

## 2025-04-18 RX ADMIN — SODIUM CHLORIDE 3000 MG: 9 INJECTION, SOLUTION INTRAVENOUS at 08:37

## 2025-04-18 RX ADMIN — INSULIN LISPRO 4 UNITS: 100 INJECTION, SOLUTION INTRAVENOUS; SUBCUTANEOUS at 20:44

## 2025-04-18 RX ADMIN — FENTANYL CITRATE 50 MCG: 50 INJECTION INTRAMUSCULAR; INTRAVENOUS at 10:27

## 2025-04-18 RX ADMIN — SODIUM CHLORIDE: 0.45 INJECTION, SOLUTION INTRAVENOUS at 11:48

## 2025-04-18 RX ADMIN — DEXMEDETOMIDINE 10 MCG: 100 INJECTION, SOLUTION INTRAVENOUS at 10:59

## 2025-04-18 RX ADMIN — DEXAMETHASONE SODIUM PHOSPHATE 10 MG: 4 INJECTION, SOLUTION INTRAMUSCULAR; INTRAVENOUS at 10:27

## 2025-04-18 RX ADMIN — SODIUM CHLORIDE 3000 MG: 9 INJECTION, SOLUTION INTRAVENOUS at 20:07

## 2025-04-18 RX ADMIN — OXYCODONE 5 MG: 5 TABLET ORAL at 20:51

## 2025-04-18 RX ADMIN — INSULIN LISPRO 2 UNITS: 100 INJECTION, SOLUTION INTRAVENOUS; SUBCUTANEOUS at 12:57

## 2025-04-18 RX ADMIN — INSULIN GLARGINE 25 UNITS: 100 INJECTION, SOLUTION SUBCUTANEOUS at 08:51

## 2025-04-18 RX ADMIN — SODIUM CHLORIDE 3000 MG: 9 INJECTION, SOLUTION INTRAVENOUS at 13:57

## 2025-04-18 RX ADMIN — SODIUM CHLORIDE 3000 MG: 9 INJECTION, SOLUTION INTRAVENOUS at 01:22

## 2025-04-18 RX ADMIN — FENTANYL CITRATE 50 MCG: 50 INJECTION INTRAMUSCULAR; INTRAVENOUS at 10:39

## 2025-04-18 RX ADMIN — INSULIN LISPRO 8 UNITS: 100 INJECTION, SOLUTION INTRAVENOUS; SUBCUTANEOUS at 20:44

## 2025-04-18 RX ADMIN — PROPOFOL 200 MG: 10 INJECTION, EMULSION INTRAVENOUS at 10:27

## 2025-04-18 RX ADMIN — Medication 100 MCG: at 10:41

## 2025-04-18 RX ADMIN — INSULIN LISPRO 4 UNITS: 100 INJECTION, SOLUTION INTRAVENOUS; SUBCUTANEOUS at 08:52

## 2025-04-18 RX ADMIN — INSULIN LISPRO 10 UNITS: 100 INJECTION, SOLUTION INTRAVENOUS; SUBCUTANEOUS at 12:57

## 2025-04-18 RX ADMIN — ENOXAPARIN SODIUM 40 MG: 100 INJECTION SUBCUTANEOUS at 21:39

## 2025-04-18 ASSESSMENT — PAIN DESCRIPTION - DESCRIPTORS: DESCRIPTORS: ACHING;BURNING

## 2025-04-18 ASSESSMENT — LIFESTYLE VARIABLES: SMOKING_STATUS: 1

## 2025-04-18 ASSESSMENT — PAIN SCALES - GENERAL
PAINLEVEL_OUTOF10: 0

## 2025-04-18 ASSESSMENT — PAIN DESCRIPTION - ORIENTATION: ORIENTATION: RIGHT

## 2025-04-18 ASSESSMENT — PAIN DESCRIPTION - LOCATION: LOCATION: FOOT

## 2025-04-18 ASSESSMENT — ENCOUNTER SYMPTOMS: SHORTNESS OF BREATH: 0

## 2025-04-18 ASSESSMENT — PAIN - FUNCTIONAL ASSESSMENT: PAIN_FUNCTIONAL_ASSESSMENT: ACTIVITIES ARE NOT PREVENTED

## 2025-04-18 ASSESSMENT — PAIN SCALES - WONG BAKER: WONGBAKER_NUMERICALRESPONSE: NO HURT

## 2025-04-18 NOTE — ANESTHESIA PRE PROCEDURE
Department of Anesthesiology  Preprocedure Note       Name:  Reilly Alexander   Age:  24 y.o.  :  2000                                          MRN:  [id number]         Date:  2025      Surgeon: Surgeon(s):  Aly Bourne MD    Procedure: Procedure(s):  INCISION AND DRAINAGE RIGHT FOOT    Medications prior to admission:   Prior to Admission medications    Medication Sig Start Date End Date Taking? Authorizing Provider   glucose monitoring kit (FREESTYLE) monitoring kit 1 kit by Does not apply route daily 19  Yes Tj Potter MD   blood glucose test strips (RELION GLUCOSE TEST STRIPS) strip 1 each by In Vitro route 4 times daily As needed. 19  Yes Tj Potter MD   Insulin Pen Needle (B-D ULTRAFINE III SHORT PEN) 31G X 8 MM MISC 1 each by Does not apply route daily 19  Yes Tj Potter MD       Current medications:    Current Facility-Administered Medications   Medication Dose Route Frequency Provider Last Rate Last Admin   • insulin lispro (HUMALOG,ADMELOG) injection vial 10 Units  10 Units SubCUTAneous TID WC James Irizarry MD   10 Units at 25   • insulin glargine (LANTUS) injection vial 25 Units  25 Units SubCUTAneous Daily James Irizarry MD   25 Units at 25 0851   • 0.45 % sodium chloride infusion   IntraVENous Continuous Aly Bourne MD 75 mL/hr at 25 1803 New Bag at 25   • sodium chloride flush 0.9 % injection 5-40 mL  5-40 mL IntraVENous 2 times per day Aly Bourne MD   10 mL at 25   • sodium chloride flush 0.9 % injection 5-40 mL  5-40 mL IntraVENous PRN Aly Bourne MD       • 0.9 % sodium chloride infusion   IntraVENous PRN Aly Bourne MD       • oxyCODONE (ROXICODONE) immediate release tablet 5 mg  5 mg Oral Q4H PRN Aly Bourne MD        Or   • oxyCODONE HCl (OXY-IR) immediate release tablet 10 mg  10 mg Oral Q4H PRN Aly Bourne MD       • morphine (PF) injection 2 mg  2 mg IntraVENous Q3H

## 2025-04-18 NOTE — FLOWSHEET NOTE
Bld sugar 198.  Dr. Pitt notified.  No coverage at this time.  Wants him to be able to eat with his insulin, so wait to cover it when he returns to floor..  Pt. More awake, talking, denies pain.

## 2025-04-18 NOTE — CARE COORDINATION
Discharge Planning:    Per chart review, patient admitted for DKA type 1, also with diabetic foot infection with osteomyelitis. Patient to have stage II I&D today with Dr. Bourne.    At this time, patient plans to return home with family support. Patient has been provided with [facility] information for Rx assistance and [facility] info for PCP. CM to continue to follow for updates.    Electronically signed by LOULOU ESCOTO RN on 4/18/2025 at 10:36 AM

## 2025-04-18 NOTE — OP NOTE
[facility]          [address] [place] [address]                            OPERATIVE REPORT      PATIENT NAME: WILLIAN FIGUEROA                : 2000  MED REC NO: [id number]                      ROOM: OR  ACCOUNT NO: [id number]                       ADMIT DATE: 2025  PROVIDER: Aly Bourne MD      DATE OF PROCEDURE:  2025    SURGEON:  Aly Bourne MD    ASSISTANT:  Yossi surgical assistant.    PREOPERATIVE DIAGNOSIS:  Right foot deep infection with multiple areas with necrotic tissue and abscess.    POSTOPERATIVE DIAGNOSIS:  Right foot deep infection with multiple areas with necrotic tissue and abscess.    PROCEDURE DONE:  Incision and drainage below fascia, multiple areas, right foot.    ANESTHESIA:  General anesthesia.    ESTIMATED BLOOD LOSS:  Minimal.    COMPLICATIONS:  None.    FINDINGS:  Still remaining necrotic tissue that was sharply excised, but also multiple areas of deep infection.    INDICATIONS:  This is a 24-year-old diabetic male who presented with chronic infection of the right foot, was admitted with osteomyelitis of the fifth metatarsal.  He underwent the first procedure 2 days ago, and this is a staged procedure for his infection.  All risks, benefits, and alternatives discussed with the patient.  He agreed to proceed with surgical treatment.    DESCRIPTION OF PROCEDURE:  The patient's right foot was marked.  He was already on IV antibiotic.  The patient was then brought to the operating room and underwent general anesthesia.  The right lower extremity was then prepped and draped in regular sterile routine fashion.  A time-out was called confirming the patient's name and site of procedure.    We started with excisional debridement of skin, subcutaneous tissue, fascia, and bone.  There were multiple areas involved with 3 different wounds.  We used a curette as well as the rongeur to incise and drain the necrotic

## 2025-04-18 NOTE — ANESTHESIA POSTPROCEDURE EVALUATION
Department of Anesthesiology  Postprocedure Note    Patient: Reilly Alexander  MRN: [id number]  YOB: 2000  Date of evaluation: 4/18/2025    Procedure Summary       Date: 04/18/25 Room / Location: [address]    Anesthesia Start: 1023 Anesthesia Stop: 1111    Procedure: INCISION AND DRAINAGE RIGHT FOOT (Right: Foot) Diagnosis:       Other acute osteomyelitis of right foot      (Other acute osteomyelitis of right foot [M86.171])    Surgeons: Aly Bourne MD Responsible Provider: Vinita Pitt MD    Anesthesia Type: MAC ASA Status: 2            Anesthesia Type: No value filed.    Lois Phase I: Lois Score: 5    Lois Phase II:      Anesthesia Post Evaluation    Patient location during evaluation: PACU  Patient participation: complete - patient participated  Level of consciousness: awake  Pain score: 0  Airway patency: patent  Nausea & Vomiting: no nausea  Cardiovascular status: hemodynamically stable  Respiratory status: acceptable  Hydration status: euvolemic  Multimodal analgesia pain management approach    No notable events documented.

## 2025-04-18 NOTE — BRIEF OP NOTE
Brief Postoperative Note      Patient: Reilly Alexander  YOB: 2000  MRN: [id number]    Date of Procedure: 4/18/2025    Pre-Op Diagnosis Codes:      * Other acute osteomyelitis of right foot [M86.171]    Post-Op Diagnosis: Same       Procedure(s):  INCISION AND DRAINAGE RIGHT FOOT    Surgeon(s):  Aly Bourne MD    Assistant:  Surgical Assistant: Yossi Gao    Anesthesia: Choice    Estimated Blood Loss (mL): Minimal    Complications: None    Specimens:   * No specimens in log *    Implants:  * No implants in log *      Drains: * No LDAs found *    Findings:  Infection Present At Time Of Surgery (PATOS) (choose all levels that have infection present):  - Deep Infection (muscle/fascia) present as evidenced by fluid consistent with infection  Other Findings: Same.    Electronically signed by Aly Bourne MD on 4/18/2025 at 11:16 AM

## 2025-04-19 PROBLEM — A49.1 GROUP B STREPTOCOCCAL INFECTION: Status: ACTIVE | Noted: 2025-04-19

## 2025-04-19 LAB
BACTERIA SPEC AEROBE CULT: ABNORMAL
BACTERIA SPEC AEROBE CULT: ABNORMAL
BACTERIA SPEC ANAEROBE CULT: ABNORMAL
BACTERIA SPEC ANAEROBE CULT: ABNORMAL
GLUCOSE BLD-MCNC: 148 MG/DL (ref 70–99)
GLUCOSE BLD-MCNC: 172 MG/DL (ref 70–99)
GLUCOSE BLD-MCNC: 204 MG/DL (ref 70–99)
GLUCOSE BLD-MCNC: 260 MG/DL (ref 70–99)
GRAM STN SPEC: ABNORMAL
HCT VFR BLD AUTO: 32.2 % (ref 40.5–52.5)
HGB BLD-MCNC: 11 G/DL (ref 13.5–17.5)
ORGANISM: ABNORMAL
PERFORMED ON: ABNORMAL

## 2025-04-19 PROCEDURE — 85014 HEMATOCRIT: CPT

## 2025-04-19 PROCEDURE — 6370000000 HC RX 637 (ALT 250 FOR IP): Performed by: ORTHOPAEDIC SURGERY

## 2025-04-19 PROCEDURE — 36415 COLL VENOUS BLD VENIPUNCTURE: CPT

## 2025-04-19 PROCEDURE — 94760 N-INVAS EAR/PLS OXIMETRY 1: CPT

## 2025-04-19 PROCEDURE — 1200000000 HC SEMI PRIVATE

## 2025-04-19 PROCEDURE — 2580000003 HC RX 258: Performed by: ORTHOPAEDIC SURGERY

## 2025-04-19 PROCEDURE — 2500000003 HC RX 250 WO HCPCS: Performed by: ORTHOPAEDIC SURGERY

## 2025-04-19 PROCEDURE — 6370000000 HC RX 637 (ALT 250 FOR IP): Performed by: INTERNAL MEDICINE

## 2025-04-19 PROCEDURE — 6360000002 HC RX W HCPCS: Performed by: ORTHOPAEDIC SURGERY

## 2025-04-19 PROCEDURE — 85018 HEMOGLOBIN: CPT

## 2025-04-19 RX ORDER — INSULIN LISPRO 100 [IU]/ML
12 INJECTION, SOLUTION INTRAVENOUS; SUBCUTANEOUS
Status: DISCONTINUED | OUTPATIENT
Start: 2025-04-19 | End: 2025-04-23 | Stop reason: HOSPADM

## 2025-04-19 RX ORDER — INSULIN GLARGINE 100 [IU]/ML
30 INJECTION, SOLUTION SUBCUTANEOUS DAILY
Status: DISCONTINUED | OUTPATIENT
Start: 2025-04-20 | End: 2025-04-23 | Stop reason: HOSPADM

## 2025-04-19 RX ORDER — INSULIN GLARGINE 100 [IU]/ML
5 INJECTION, SOLUTION SUBCUTANEOUS ONCE
Status: COMPLETED | OUTPATIENT
Start: 2025-04-19 | End: 2025-04-19

## 2025-04-19 RX ORDER — INSULIN LISPRO 100 [IU]/ML
12 INJECTION, SOLUTION INTRAVENOUS; SUBCUTANEOUS
Status: DISCONTINUED | OUTPATIENT
Start: 2025-04-19 | End: 2025-04-19

## 2025-04-19 RX ORDER — ACETAMINOPHEN 325 MG/1
650 TABLET ORAL EVERY 4 HOURS PRN
Status: DISCONTINUED | OUTPATIENT
Start: 2025-04-19 | End: 2025-04-23 | Stop reason: HOSPADM

## 2025-04-19 RX ADMIN — INSULIN LISPRO 12 UNITS: 100 INJECTION, SOLUTION INTRAVENOUS; SUBCUTANEOUS at 13:23

## 2025-04-19 RX ADMIN — SODIUM CHLORIDE 3000 MG: 9 INJECTION, SOLUTION INTRAVENOUS at 21:00

## 2025-04-19 RX ADMIN — ENOXAPARIN SODIUM 40 MG: 100 INJECTION SUBCUTANEOUS at 21:01

## 2025-04-19 RX ADMIN — SODIUM CHLORIDE: 0.45 INJECTION, SOLUTION INTRAVENOUS at 19:40

## 2025-04-19 RX ADMIN — SODIUM CHLORIDE 3000 MG: 9 INJECTION, SOLUTION INTRAVENOUS at 02:54

## 2025-04-19 RX ADMIN — INSULIN GLARGINE 5 UNITS: 100 INJECTION, SOLUTION SUBCUTANEOUS at 13:23

## 2025-04-19 RX ADMIN — INSULIN LISPRO 2 UNITS: 100 INJECTION, SOLUTION INTRAVENOUS; SUBCUTANEOUS at 18:28

## 2025-04-19 RX ADMIN — INSULIN GLARGINE 25 UNITS: 100 INJECTION, SOLUTION SUBCUTANEOUS at 09:20

## 2025-04-19 RX ADMIN — SODIUM CHLORIDE 3000 MG: 9 INJECTION, SOLUTION INTRAVENOUS at 09:19

## 2025-04-19 RX ADMIN — SODIUM CHLORIDE, PRESERVATIVE FREE 10 ML: 5 INJECTION INTRAVENOUS at 21:05

## 2025-04-19 RX ADMIN — INSULIN LISPRO 4 UNITS: 100 INJECTION, SOLUTION INTRAVENOUS; SUBCUTANEOUS at 09:21

## 2025-04-19 RX ADMIN — INSULIN LISPRO 10 UNITS: 100 INJECTION, SOLUTION INTRAVENOUS; SUBCUTANEOUS at 09:20

## 2025-04-19 RX ADMIN — INSULIN LISPRO 12 UNITS: 100 INJECTION, SOLUTION INTRAVENOUS; SUBCUTANEOUS at 18:29

## 2025-04-19 RX ADMIN — SODIUM CHLORIDE 3000 MG: 9 INJECTION, SOLUTION INTRAVENOUS at 14:26

## 2025-04-19 ASSESSMENT — PAIN SCALES - GENERAL
PAINLEVEL_OUTOF10: 0

## 2025-04-19 ASSESSMENT — PAIN SCALES - WONG BAKER: WONGBAKER_NUMERICALRESPONSE: NO HURT

## 2025-04-20 LAB
ALBUMIN SERPL-MCNC: 2.8 G/DL (ref 3.4–5)
ALBUMIN/GLOB SERPL: 1.1 {RATIO} (ref 1.1–2.2)
ALP SERPL-CCNC: 118 U/L (ref 40–129)
ALT SERPL-CCNC: 28 U/L (ref 10–40)
ANION GAP SERPL CALCULATED.3IONS-SCNC: 8 MMOL/L (ref 3–16)
AST SERPL-CCNC: 29 U/L (ref 15–37)
BASOPHILS # BLD: 0 K/UL (ref 0–0.2)
BASOPHILS NFR BLD: 0.5 %
BILIRUB SERPL-MCNC: <0.2 MG/DL (ref 0–1)
BUN SERPL-MCNC: 12 MG/DL (ref 7–20)
CALCIUM SERPL-MCNC: 8.4 MG/DL (ref 8.3–10.6)
CHLORIDE SERPL-SCNC: 100 MMOL/L (ref 99–110)
CO2 SERPL-SCNC: 30 MMOL/L (ref 21–32)
CREAT SERPL-MCNC: 0.4 MG/DL (ref 0.9–1.3)
DEPRECATED RDW RBC AUTO: 12.2 % (ref 12.4–15.4)
EOSINOPHIL # BLD: 0 K/UL (ref 0–0.6)
EOSINOPHIL NFR BLD: 1 %
GFR SERPLBLD CREATININE-BSD FMLA CKD-EPI: >90 ML/MIN/{1.73_M2}
GLUCOSE BLD-MCNC: 107 MG/DL (ref 70–99)
GLUCOSE BLD-MCNC: 190 MG/DL (ref 70–99)
GLUCOSE BLD-MCNC: 195 MG/DL (ref 70–99)
GLUCOSE BLD-MCNC: 206 MG/DL (ref 70–99)
GLUCOSE BLD-MCNC: 291 MG/DL (ref 70–99)
GLUCOSE SERPL-MCNC: 320 MG/DL (ref 70–99)
HCT VFR BLD AUTO: 31.7 % (ref 40.5–52.5)
HGB BLD-MCNC: 11.1 G/DL (ref 13.5–17.5)
LYMPHOCYTES # BLD: 2.1 K/UL (ref 1–5.1)
LYMPHOCYTES NFR BLD: 44.7 %
MCH RBC QN AUTO: 30.1 PG (ref 26–34)
MCHC RBC AUTO-ENTMCNC: 34.9 G/DL (ref 31–36)
MCV RBC AUTO: 86.2 FL (ref 80–100)
MONOCYTES # BLD: 0.4 K/UL (ref 0–1.3)
MONOCYTES NFR BLD: 8.7 %
NEUTROPHILS # BLD: 2.1 K/UL (ref 1.7–7.7)
NEUTROPHILS NFR BLD: 45.1 %
PERFORMED ON: ABNORMAL
PLATELET # BLD AUTO: 316 K/UL (ref 135–450)
PMV BLD AUTO: 6.8 FL (ref 5–10.5)
POTASSIUM SERPL-SCNC: 3.9 MMOL/L (ref 3.5–5.1)
PROT SERPL-MCNC: 5.4 G/DL (ref 6.4–8.2)
RBC # BLD AUTO: 3.67 M/UL (ref 4.2–5.9)
SODIUM SERPL-SCNC: 138 MMOL/L (ref 136–145)
WBC # BLD AUTO: 4.6 K/UL (ref 4–11)

## 2025-04-20 PROCEDURE — 80053 COMPREHEN METABOLIC PANEL: CPT

## 2025-04-20 PROCEDURE — 2580000003 HC RX 258: Performed by: ORTHOPAEDIC SURGERY

## 2025-04-20 PROCEDURE — 1200000000 HC SEMI PRIVATE

## 2025-04-20 PROCEDURE — 6360000002 HC RX W HCPCS: Performed by: ORTHOPAEDIC SURGERY

## 2025-04-20 PROCEDURE — 6370000000 HC RX 637 (ALT 250 FOR IP): Performed by: ORTHOPAEDIC SURGERY

## 2025-04-20 PROCEDURE — 2500000003 HC RX 250 WO HCPCS: Performed by: ORTHOPAEDIC SURGERY

## 2025-04-20 PROCEDURE — 6370000000 HC RX 637 (ALT 250 FOR IP): Performed by: INTERNAL MEDICINE

## 2025-04-20 PROCEDURE — 36415 COLL VENOUS BLD VENIPUNCTURE: CPT

## 2025-04-20 PROCEDURE — 85025 COMPLETE CBC W/AUTO DIFF WBC: CPT

## 2025-04-20 RX ORDER — ACYCLOVIR 400 MG/1
TABLET ORAL
COMMUNITY

## 2025-04-20 RX ADMIN — SODIUM CHLORIDE 3000 MG: 9 INJECTION, SOLUTION INTRAVENOUS at 20:19

## 2025-04-20 RX ADMIN — INSULIN LISPRO 4 UNITS: 100 INJECTION, SOLUTION INTRAVENOUS; SUBCUTANEOUS at 08:42

## 2025-04-20 RX ADMIN — ENOXAPARIN SODIUM 40 MG: 100 INJECTION SUBCUTANEOUS at 21:13

## 2025-04-20 RX ADMIN — INSULIN GLARGINE 30 UNITS: 100 INJECTION, SOLUTION SUBCUTANEOUS at 08:39

## 2025-04-20 RX ADMIN — INSULIN LISPRO 2 UNITS: 100 INJECTION, SOLUTION INTRAVENOUS; SUBCUTANEOUS at 21:12

## 2025-04-20 RX ADMIN — INSULIN LISPRO 2 UNITS: 100 INJECTION, SOLUTION INTRAVENOUS; SUBCUTANEOUS at 18:46

## 2025-04-20 RX ADMIN — SODIUM CHLORIDE: 0.45 INJECTION, SOLUTION INTRAVENOUS at 11:37

## 2025-04-20 RX ADMIN — SODIUM CHLORIDE 3000 MG: 9 INJECTION, SOLUTION INTRAVENOUS at 01:49

## 2025-04-20 RX ADMIN — SODIUM CHLORIDE, PRESERVATIVE FREE 10 ML: 5 INJECTION INTRAVENOUS at 21:13

## 2025-04-20 RX ADMIN — ACETAMINOPHEN 650 MG: 325 TABLET ORAL at 17:57

## 2025-04-20 RX ADMIN — SODIUM CHLORIDE 3000 MG: 9 INJECTION, SOLUTION INTRAVENOUS at 14:59

## 2025-04-20 RX ADMIN — INSULIN LISPRO 12 UNITS: 100 INJECTION, SOLUTION INTRAVENOUS; SUBCUTANEOUS at 08:42

## 2025-04-20 RX ADMIN — INSULIN LISPRO 12 UNITS: 100 INJECTION, SOLUTION INTRAVENOUS; SUBCUTANEOUS at 18:45

## 2025-04-20 RX ADMIN — POLYETHYLENE GLYCOL 3350 17 G: 17 POWDER, FOR SOLUTION ORAL at 08:37

## 2025-04-20 RX ADMIN — SODIUM CHLORIDE 3000 MG: 9 INJECTION, SOLUTION INTRAVENOUS at 08:39

## 2025-04-20 ASSESSMENT — PAIN SCALES - GENERAL: PAINLEVEL_OUTOF10: 3

## 2025-04-20 ASSESSMENT — PAIN DESCRIPTION - LOCATION: LOCATION: FOOT

## 2025-04-20 ASSESSMENT — PAIN - FUNCTIONAL ASSESSMENT: PAIN_FUNCTIONAL_ASSESSMENT: ACTIVITIES ARE NOT PREVENTED

## 2025-04-20 ASSESSMENT — PAIN DESCRIPTION - DESCRIPTORS: DESCRIPTORS: ACHING

## 2025-04-20 ASSESSMENT — PAIN DESCRIPTION - ORIENTATION: ORIENTATION: RIGHT

## 2025-04-20 NOTE — DISCHARGE INSTRUCTIONS
American Diabetes Association:     About Diabetes: https://diabetes.org/about-diabetes     Life with Diabetes: https://diabetes.org/living-with-diabetes     Health & Wellness: https://diabetes.org/health-wellness     Food & Nutrition: https://diabetes.org/food-nutrition     Tools & Resources: https://diabetes.org/tools-resources  _____________________________________________________    [employer] Customer Service: 1-309.530.9740    Call if your sensor falls off, or if you have the following tests (MRI or CT Scan). [employer] will validate your name, date of birth, address, and validate you have an active order. [employer] allows 3 replacements per year. It takes 3-5 business days to receive replacement sensor     Go to [employer] Financial Assistance (online):    https://www.[employer]/faqs/patient-assistance-program  ______________________________________________________    [employer] (6-months individualized program for adults with T1D)    www.[employer].org    You may be offered assistance with the following:  -Insulin adjustments  - Carb Counting  - Insurance navigation  - Diabetes Counseling  - Prescription Assistance  - Peer Support  - Diabetes Technology  - Food/Employment & Housing Concerns  ___________________________________________________    VASHE wet gauze then dry dressing daily to right foot. Secure with kerlix then ACE  Weightbearing as tolerated right foot in postop shoe  Followup Dr Mercer in office in one week  [facility], [address], [address]   [address],   [phone]   ________________________________________________

## 2025-04-21 ENCOUNTER — ANESTHESIA EVENT (OUTPATIENT)
Dept: OPERATING ROOM | Age: 25
DRG: 853 | End: 2025-04-21

## 2025-04-21 ENCOUNTER — ANESTHESIA (OUTPATIENT)
Dept: OPERATING ROOM | Age: 25
DRG: 853 | End: 2025-04-21

## 2025-04-21 LAB
ALBUMIN SERPL-MCNC: 2.8 G/DL (ref 3.4–5)
ANION GAP SERPL CALCULATED.3IONS-SCNC: 6 MMOL/L (ref 3–16)
BACTERIA SPEC AEROBE CULT: ABNORMAL
BACTERIA SPEC ANAEROBE CULT: ABNORMAL
BUN SERPL-MCNC: 10 MG/DL (ref 7–20)
CALCIUM SERPL-MCNC: 8.6 MG/DL (ref 8.3–10.6)
CHLORIDE SERPL-SCNC: 101 MMOL/L (ref 99–110)
CO2 SERPL-SCNC: 30 MMOL/L (ref 21–32)
CREAT SERPL-MCNC: 0.4 MG/DL (ref 0.9–1.3)
GFR SERPLBLD CREATININE-BSD FMLA CKD-EPI: >90 ML/MIN/{1.73_M2}
GLUCOSE BLD-MCNC: 111 MG/DL (ref 70–99)
GLUCOSE BLD-MCNC: 123 MG/DL (ref 70–99)
GLUCOSE BLD-MCNC: 139 MG/DL (ref 70–99)
GLUCOSE BLD-MCNC: 157 MG/DL (ref 70–99)
GLUCOSE BLD-MCNC: 181 MG/DL (ref 70–99)
GLUCOSE BLD-MCNC: 272 MG/DL (ref 70–99)
GLUCOSE SERPL-MCNC: 244 MG/DL (ref 70–99)
GRAM STN SPEC: ABNORMAL
MAGNESIUM SERPL-MCNC: 1.98 MG/DL (ref 1.8–2.4)
ORGANISM: ABNORMAL
PERFORMED ON: ABNORMAL
PHOSPHATE SERPL-MCNC: 3 MG/DL (ref 2.5–4.9)
POTASSIUM SERPL-SCNC: 4.3 MMOL/L (ref 3.5–5.1)
SODIUM SERPL-SCNC: 137 MMOL/L (ref 136–145)

## 2025-04-21 PROCEDURE — 6360000002 HC RX W HCPCS: Performed by: ORTHOPAEDIC SURGERY

## 2025-04-21 PROCEDURE — 7100000000 HC PACU RECOVERY - FIRST 15 MIN: Performed by: ORTHOPAEDIC SURGERY

## 2025-04-21 PROCEDURE — 2500000003 HC RX 250 WO HCPCS: Performed by: ORTHOPAEDIC SURGERY

## 2025-04-21 PROCEDURE — 6370000000 HC RX 637 (ALT 250 FOR IP): Performed by: INTERNAL MEDICINE

## 2025-04-21 PROCEDURE — 0J9Q0ZZ DRAINAGE OF RIGHT FOOT SUBCUTANEOUS TISSUE AND FASCIA, OPEN APPROACH: ICD-10-PCS | Performed by: ORTHOPAEDIC SURGERY

## 2025-04-21 PROCEDURE — 7100000001 HC PACU RECOVERY - ADDTL 15 MIN: Performed by: ORTHOPAEDIC SURGERY

## 2025-04-21 PROCEDURE — 2580000003 HC RX 258: Performed by: ORTHOPAEDIC SURGERY

## 2025-04-21 PROCEDURE — 3600000012 HC SURGERY LEVEL 2 ADDTL 15MIN: Performed by: ORTHOPAEDIC SURGERY

## 2025-04-21 PROCEDURE — 83735 ASSAY OF MAGNESIUM: CPT

## 2025-04-21 PROCEDURE — 94760 N-INVAS EAR/PLS OXIMETRY 1: CPT

## 2025-04-21 PROCEDURE — 80069 RENAL FUNCTION PANEL: CPT

## 2025-04-21 PROCEDURE — 6370000000 HC RX 637 (ALT 250 FOR IP): Performed by: ORTHOPAEDIC SURGERY

## 2025-04-21 PROCEDURE — 2580000003 HC RX 258: Performed by: NURSE ANESTHETIST, CERTIFIED REGISTERED

## 2025-04-21 PROCEDURE — 99233 SBSQ HOSP IP/OBS HIGH 50: CPT | Performed by: INTERNAL MEDICINE

## 2025-04-21 PROCEDURE — 1200000000 HC SEMI PRIVATE

## 2025-04-21 PROCEDURE — 2709999900 HC NON-CHARGEABLE SUPPLY: Performed by: ORTHOPAEDIC SURGERY

## 2025-04-21 PROCEDURE — 3700000000 HC ANESTHESIA ATTENDED CARE: Performed by: ORTHOPAEDIC SURGERY

## 2025-04-21 PROCEDURE — 6360000002 HC RX W HCPCS: Performed by: NURSE ANESTHETIST, CERTIFIED REGISTERED

## 2025-04-21 PROCEDURE — 3700000001 HC ADD 15 MINUTES (ANESTHESIA): Performed by: ORTHOPAEDIC SURGERY

## 2025-04-21 PROCEDURE — 3600000002 HC SURGERY LEVEL 2 BASE: Performed by: ORTHOPAEDIC SURGERY

## 2025-04-21 PROCEDURE — 36415 COLL VENOUS BLD VENIPUNCTURE: CPT

## 2025-04-21 RX ORDER — MEPERIDINE HYDROCHLORIDE 25 MG/ML
12.5 INJECTION INTRAMUSCULAR; INTRAVENOUS; SUBCUTANEOUS
Status: DISCONTINUED | OUTPATIENT
Start: 2025-04-21 | End: 2025-04-21 | Stop reason: HOSPADM

## 2025-04-21 RX ORDER — LIDOCAINE HYDROCHLORIDE 20 MG/ML
INJECTION, SOLUTION EPIDURAL; INFILTRATION; INTRACAUDAL; PERINEURAL
Status: DISCONTINUED | OUTPATIENT
Start: 2025-04-21 | End: 2025-04-21 | Stop reason: SDUPTHER

## 2025-04-21 RX ORDER — HALOPERIDOL 5 MG/ML
1 INJECTION INTRAMUSCULAR
Status: DISCONTINUED | OUTPATIENT
Start: 2025-04-21 | End: 2025-04-21 | Stop reason: HOSPADM

## 2025-04-21 RX ORDER — SODIUM CHLORIDE 9 MG/ML
INJECTION, SOLUTION INTRAVENOUS PRN
Status: DISCONTINUED | OUTPATIENT
Start: 2025-04-21 | End: 2025-04-21 | Stop reason: HOSPADM

## 2025-04-21 RX ORDER — LORAZEPAM 2 MG/ML
0.5 INJECTION INTRAMUSCULAR
Status: DISCONTINUED | OUTPATIENT
Start: 2025-04-21 | End: 2025-04-21 | Stop reason: HOSPADM

## 2025-04-21 RX ORDER — MIDAZOLAM HYDROCHLORIDE 1 MG/ML
INJECTION, SOLUTION INTRAMUSCULAR; INTRAVENOUS
Status: DISCONTINUED | OUTPATIENT
Start: 2025-04-21 | End: 2025-04-21 | Stop reason: SDUPTHER

## 2025-04-21 RX ORDER — FENTANYL CITRATE 50 UG/ML
50 INJECTION, SOLUTION INTRAMUSCULAR; INTRAVENOUS EVERY 5 MIN PRN
Status: DISCONTINUED | OUTPATIENT
Start: 2025-04-21 | End: 2025-04-21 | Stop reason: HOSPADM

## 2025-04-21 RX ORDER — SODIUM CHLORIDE 0.9 % (FLUSH) 0.9 %
5-40 SYRINGE (ML) INJECTION PRN
Status: DISCONTINUED | OUTPATIENT
Start: 2025-04-21 | End: 2025-04-21 | Stop reason: HOSPADM

## 2025-04-21 RX ORDER — OXYCODONE HYDROCHLORIDE 10 MG/1
10 TABLET ORAL EVERY 4 HOURS PRN
Status: DISCONTINUED | OUTPATIENT
Start: 2025-04-21 | End: 2025-04-23 | Stop reason: HOSPADM

## 2025-04-21 RX ORDER — ASPIRIN 325 MG
325 TABLET, DELAYED RELEASE (ENTERIC COATED) ORAL 2 TIMES DAILY
Status: DISCONTINUED | OUTPATIENT
Start: 2025-04-22 | End: 2025-04-23 | Stop reason: HOSPADM

## 2025-04-21 RX ORDER — SODIUM CHLORIDE 450 MG/100ML
INJECTION, SOLUTION INTRAVENOUS CONTINUOUS
Status: DISCONTINUED | OUTPATIENT
Start: 2025-04-21 | End: 2025-04-23

## 2025-04-21 RX ORDER — ONDANSETRON 2 MG/ML
4 INJECTION INTRAMUSCULAR; INTRAVENOUS
Status: DISCONTINUED | OUTPATIENT
Start: 2025-04-21 | End: 2025-04-21 | Stop reason: HOSPADM

## 2025-04-21 RX ORDER — DIPHENHYDRAMINE HYDROCHLORIDE 50 MG/ML
12.5 INJECTION, SOLUTION INTRAMUSCULAR; INTRAVENOUS
Status: DISCONTINUED | OUTPATIENT
Start: 2025-04-21 | End: 2025-04-21 | Stop reason: HOSPADM

## 2025-04-21 RX ORDER — SODIUM CHLORIDE 0.9 % (FLUSH) 0.9 %
5-40 SYRINGE (ML) INJECTION EVERY 12 HOURS SCHEDULED
Status: DISCONTINUED | OUTPATIENT
Start: 2025-04-21 | End: 2025-04-23 | Stop reason: HOSPADM

## 2025-04-21 RX ORDER — PROPOFOL 10 MG/ML
INJECTION, EMULSION INTRAVENOUS
Status: DISCONTINUED | OUTPATIENT
Start: 2025-04-21 | End: 2025-04-21 | Stop reason: SDUPTHER

## 2025-04-21 RX ORDER — OXYCODONE HYDROCHLORIDE 5 MG/1
5 TABLET ORAL EVERY 4 HOURS PRN
Status: DISCONTINUED | OUTPATIENT
Start: 2025-04-21 | End: 2025-04-23 | Stop reason: HOSPADM

## 2025-04-21 RX ORDER — NALOXONE HYDROCHLORIDE 0.4 MG/ML
INJECTION, SOLUTION INTRAMUSCULAR; INTRAVENOUS; SUBCUTANEOUS PRN
Status: DISCONTINUED | OUTPATIENT
Start: 2025-04-21 | End: 2025-04-21 | Stop reason: HOSPADM

## 2025-04-21 RX ORDER — SODIUM CHLORIDE 9 MG/ML
INJECTION, SOLUTION INTRAVENOUS
Status: DISCONTINUED | OUTPATIENT
Start: 2025-04-21 | End: 2025-04-21 | Stop reason: SDUPTHER

## 2025-04-21 RX ORDER — SODIUM CHLORIDE 0.9 % (FLUSH) 0.9 %
5-40 SYRINGE (ML) INJECTION EVERY 12 HOURS SCHEDULED
Status: DISCONTINUED | OUTPATIENT
Start: 2025-04-21 | End: 2025-04-21 | Stop reason: HOSPADM

## 2025-04-21 RX ORDER — FENTANYL CITRATE 50 UG/ML
INJECTION, SOLUTION INTRAMUSCULAR; INTRAVENOUS
Status: DISCONTINUED | OUTPATIENT
Start: 2025-04-21 | End: 2025-04-21 | Stop reason: SDUPTHER

## 2025-04-21 RX ORDER — SODIUM CHLORIDE 0.9 % (FLUSH) 0.9 %
5-40 SYRINGE (ML) INJECTION PRN
Status: DISCONTINUED | OUTPATIENT
Start: 2025-04-21 | End: 2025-04-23 | Stop reason: HOSPADM

## 2025-04-21 RX ORDER — SODIUM CHLORIDE 9 MG/ML
INJECTION, SOLUTION INTRAVENOUS PRN
Status: DISCONTINUED | OUTPATIENT
Start: 2025-04-21 | End: 2025-04-23 | Stop reason: HOSPADM

## 2025-04-21 RX ORDER — MORPHINE SULFATE 2 MG/ML
2 INJECTION, SOLUTION INTRAMUSCULAR; INTRAVENOUS
Status: DISCONTINUED | OUTPATIENT
Start: 2025-04-21 | End: 2025-04-23 | Stop reason: HOSPADM

## 2025-04-21 RX ADMIN — FENTANYL CITRATE 50 MCG: 50 INJECTION INTRAMUSCULAR; INTRAVENOUS at 17:45

## 2025-04-21 RX ADMIN — SODIUM CHLORIDE 3000 MG: 9 INJECTION, SOLUTION INTRAVENOUS at 20:51

## 2025-04-21 RX ADMIN — INSULIN LISPRO 2 UNITS: 100 INJECTION, SOLUTION INTRAVENOUS; SUBCUTANEOUS at 20:49

## 2025-04-21 RX ADMIN — SODIUM CHLORIDE: 0.45 INJECTION, SOLUTION INTRAVENOUS at 19:22

## 2025-04-21 RX ADMIN — SODIUM CHLORIDE: 0.45 INJECTION, SOLUTION INTRAVENOUS at 01:38

## 2025-04-21 RX ADMIN — SODIUM CHLORIDE 3000 MG: 9 INJECTION, SOLUTION INTRAVENOUS at 01:39

## 2025-04-21 RX ADMIN — SODIUM CHLORIDE 3000 MG: 9 INJECTION, SOLUTION INTRAVENOUS at 14:09

## 2025-04-21 RX ADMIN — LIDOCAINE HYDROCHLORIDE 100 MG: 20 INJECTION, SOLUTION EPIDURAL; INFILTRATION; INTRACAUDAL; PERINEURAL at 17:49

## 2025-04-21 RX ADMIN — INSULIN LISPRO 4 UNITS: 100 INJECTION, SOLUTION INTRAVENOUS; SUBCUTANEOUS at 09:06

## 2025-04-21 RX ADMIN — INSULIN GLARGINE 30 UNITS: 100 INJECTION, SOLUTION SUBCUTANEOUS at 09:06

## 2025-04-21 RX ADMIN — SODIUM CHLORIDE: 9 INJECTION, SOLUTION INTRAVENOUS at 17:45

## 2025-04-21 RX ADMIN — MIDAZOLAM 1 MG: 1 INJECTION INTRAMUSCULAR; INTRAVENOUS at 17:45

## 2025-04-21 RX ADMIN — SODIUM CHLORIDE 3000 MG: 9 INJECTION, SOLUTION INTRAVENOUS at 09:12

## 2025-04-21 RX ADMIN — SODIUM CHLORIDE, PRESERVATIVE FREE 10 ML: 5 INJECTION INTRAVENOUS at 20:49

## 2025-04-21 RX ADMIN — FENTANYL CITRATE 50 MCG: 50 INJECTION INTRAMUSCULAR; INTRAVENOUS at 17:49

## 2025-04-21 RX ADMIN — SODIUM CHLORIDE, PRESERVATIVE FREE 10 ML: 5 INJECTION INTRAVENOUS at 09:07

## 2025-04-21 RX ADMIN — PROPOFOL 150 MG: 10 INJECTION, EMULSION INTRAVENOUS at 17:49

## 2025-04-21 RX ADMIN — MIDAZOLAM 1 MG: 1 INJECTION INTRAMUSCULAR; INTRAVENOUS at 17:49

## 2025-04-21 RX ADMIN — SODIUM CHLORIDE: 0.45 INJECTION, SOLUTION INTRAVENOUS at 14:08

## 2025-04-21 ASSESSMENT — PAIN SCALES - GENERAL
PAINLEVEL_OUTOF10: 0

## 2025-04-21 ASSESSMENT — ENCOUNTER SYMPTOMS: SHORTNESS OF BREATH: 0

## 2025-04-21 ASSESSMENT — LIFESTYLE VARIABLES: SMOKING_STATUS: 1

## 2025-04-21 ASSESSMENT — PAIN - FUNCTIONAL ASSESSMENT
PAIN_FUNCTIONAL_ASSESSMENT: ADULT NONVERBAL PAIN SCALE (NPVS)
PAIN_FUNCTIONAL_ASSESSMENT: 0-10

## 2025-04-21 NOTE — ANESTHESIA POSTPROCEDURE EVALUATION
Department of Anesthesiology  Postprocedure Note    Patient: Reilly Alexander  MRN: [id number]  YOB: 2000  Date of evaluation: 4/21/2025    Procedure Summary       Date: 04/21/25 Room / Location: [address]    Anesthesia Start: 1745 Anesthesia Stop: 1817    Procedure: INCISION AND DRAINAGE RIGHT FOOT (Right: Foot) Diagnosis:       Abscess of right foot      (Abscess of right foot [L02.611])    Surgeons: Aly Bourne MD Responsible Provider: Dimitri Nguyen MD    Anesthesia Type: MAC ASA Status: 2            Anesthesia Type: No value filed.    Lois Phase I: Lois Score: 5    Lois Phase II:      Anesthesia Post Evaluation    Patient location during evaluation: bedside  Patient participation: complete - patient participated  Level of consciousness: awake and alert  Pain score: 0  Nausea & Vomiting: no nausea  Cardiovascular status: hemodynamically stable  Respiratory status: acceptable  Hydration status: stable  Pain management: adequate    No notable events documented.

## 2025-04-21 NOTE — ANESTHESIA PRE PROCEDURE
Department of Anesthesiology  Preprocedure Note       Name:  Reilly Alexander   Age:  24 y.o.  :  2000                                          MRN:  [id number]         Date:  2025      Surgeon: Surgeon(s):  Aly Bourne MD    Procedure: Procedure(s):  INCISION AND DRAINAGE RIGHT FOOT    Medications prior to admission:   Prior to Admission medications    Medication Sig Start Date End Date Taking? Authorizing Provider   Continuous Glucose  (DEXCOM G7 ) UVALDO by Does not apply route   Yes Juan Dickinson MD   Continuous Glucose Sensor (DEXCOM G7 SENSOR) MISC by Does not apply route   Yes Juan Dickinson MD   glucose monitoring kit (FREESTYLE) monitoring kit 1 kit by Does not apply route daily 19  Yes Tj Potter MD   blood glucose test strips (RELION GLUCOSE TEST STRIPS) strip 1 each by In Vitro route 4 times daily As needed. 19  Yes Tj Potter MD   Insulin Pen Needle (B-D ULTRAFINE III SHORT PEN) 31G X 8 MM MISC 1 each by Does not apply route daily 19  Yes Tj Potter MD       Current medications:    Current Facility-Administered Medications   Medication Dose Route Frequency Provider Last Rate Last Admin    insulin glargine (LANTUS) injection vial 30 Units  30 Units SubCUTAneous Daily James Irizarry MD   30 Units at 25 0906    insulin lispro (HUMALOG,ADMELOG) injection vial 12 Units  12 Units SubCUTAneous TID WC James Irizarry MD   12 Units at 25 1845    acetaminophen (TYLENOL) tablet 650 mg  650 mg Oral Q4H PRN James Irizarry MD   650 mg at 25 1757    0.45 % sodium chloride infusion   IntraVENous Continuous Aly Bourne MD 75 mL/hr at 25 1408 New Bag at 25 1408    sodium chloride flush 0.9 % injection 5-40 mL  5-40 mL IntraVENous 2 times per day Aly Bourne MD   10 mL at 25 0907    sodium chloride flush 0.9 % injection 5-40 mL  5-40 mL IntraVENous PRN Aly Bourne MD        0.9 %

## 2025-04-22 LAB
GLUCOSE BLD-MCNC: 154 MG/DL (ref 70–99)
GLUCOSE BLD-MCNC: 194 MG/DL (ref 70–99)
GLUCOSE BLD-MCNC: 208 MG/DL (ref 70–99)
GLUCOSE BLD-MCNC: 239 MG/DL (ref 70–99)
HCT VFR BLD AUTO: 32.5 % (ref 40.5–52.5)
HGB BLD-MCNC: 11.3 G/DL (ref 13.5–17.5)
PERFORMED ON: ABNORMAL

## 2025-04-22 PROCEDURE — 2500000003 HC RX 250 WO HCPCS: Performed by: ORTHOPAEDIC SURGERY

## 2025-04-22 PROCEDURE — 94760 N-INVAS EAR/PLS OXIMETRY 1: CPT

## 2025-04-22 PROCEDURE — 6360000002 HC RX W HCPCS: Performed by: ORTHOPAEDIC SURGERY

## 2025-04-22 PROCEDURE — 85018 HEMOGLOBIN: CPT

## 2025-04-22 PROCEDURE — 2500000003 HC RX 250 WO HCPCS: Performed by: INTERNAL MEDICINE

## 2025-04-22 PROCEDURE — 6370000000 HC RX 637 (ALT 250 FOR IP): Performed by: ORTHOPAEDIC SURGERY

## 2025-04-22 PROCEDURE — 2580000003 HC RX 258: Performed by: ORTHOPAEDIC SURGERY

## 2025-04-22 PROCEDURE — 1200000000 HC SEMI PRIVATE

## 2025-04-22 PROCEDURE — 6360000002 HC RX W HCPCS: Performed by: INTERNAL MEDICINE

## 2025-04-22 PROCEDURE — 36415 COLL VENOUS BLD VENIPUNCTURE: CPT

## 2025-04-22 PROCEDURE — 9990000010 HC NO CHARGE VISIT

## 2025-04-22 PROCEDURE — 99233 SBSQ HOSP IP/OBS HIGH 50: CPT | Performed by: INTERNAL MEDICINE

## 2025-04-22 PROCEDURE — C1751 CATH, INF, PER/CENT/MIDLINE: HCPCS

## 2025-04-22 PROCEDURE — 36569 INSJ PICC 5 YR+ W/O IMAGING: CPT

## 2025-04-22 PROCEDURE — 85014 HEMATOCRIT: CPT

## 2025-04-22 RX ORDER — SODIUM CHLORIDE 9 MG/ML
INJECTION, SOLUTION INTRAVENOUS PRN
Status: DISCONTINUED | OUTPATIENT
Start: 2025-04-22 | End: 2025-04-23 | Stop reason: HOSPADM

## 2025-04-22 RX ORDER — SODIUM CHLORIDE 0.9 % (FLUSH) 0.9 %
5-40 SYRINGE (ML) INJECTION PRN
Status: DISCONTINUED | OUTPATIENT
Start: 2025-04-22 | End: 2025-04-23 | Stop reason: HOSPADM

## 2025-04-22 RX ORDER — LIDOCAINE HYDROCHLORIDE 10 MG/ML
50 INJECTION, SOLUTION EPIDURAL; INFILTRATION; INTRACAUDAL; PERINEURAL ONCE
Status: COMPLETED | OUTPATIENT
Start: 2025-04-22 | End: 2025-04-22

## 2025-04-22 RX ORDER — SODIUM CHLORIDE 0.9 % (FLUSH) 0.9 %
5-40 SYRINGE (ML) INJECTION EVERY 12 HOURS SCHEDULED
Status: DISCONTINUED | OUTPATIENT
Start: 2025-04-22 | End: 2025-04-23 | Stop reason: HOSPADM

## 2025-04-22 RX ADMIN — INSULIN LISPRO 2 UNITS: 100 INJECTION, SOLUTION INTRAVENOUS; SUBCUTANEOUS at 09:05

## 2025-04-22 RX ADMIN — SODIUM CHLORIDE, PRESERVATIVE FREE 10 ML: 5 INJECTION INTRAVENOUS at 09:08

## 2025-04-22 RX ADMIN — SODIUM CHLORIDE 3000 MG: 9 INJECTION, SOLUTION INTRAVENOUS at 08:57

## 2025-04-22 RX ADMIN — SODIUM CHLORIDE: 0.45 INJECTION, SOLUTION INTRAVENOUS at 23:28

## 2025-04-22 RX ADMIN — ASPIRIN 325 MG: 325 TABLET, COATED ORAL at 08:57

## 2025-04-22 RX ADMIN — INSULIN LISPRO 12 UNITS: 100 INJECTION, SOLUTION INTRAVENOUS; SUBCUTANEOUS at 13:12

## 2025-04-22 RX ADMIN — INSULIN GLARGINE 30 UNITS: 100 INJECTION, SOLUTION SUBCUTANEOUS at 09:05

## 2025-04-22 RX ADMIN — INSULIN LISPRO 2 UNITS: 100 INJECTION, SOLUTION INTRAVENOUS; SUBCUTANEOUS at 13:11

## 2025-04-22 RX ADMIN — SODIUM CHLORIDE, PRESERVATIVE FREE 10 ML: 5 INJECTION INTRAVENOUS at 22:21

## 2025-04-22 RX ADMIN — INSULIN LISPRO 12 UNITS: 100 INJECTION, SOLUTION INTRAVENOUS; SUBCUTANEOUS at 09:05

## 2025-04-22 RX ADMIN — INSULIN LISPRO 12 UNITS: 100 INJECTION, SOLUTION INTRAVENOUS; SUBCUTANEOUS at 18:48

## 2025-04-22 RX ADMIN — INSULIN LISPRO 2 UNITS: 100 INJECTION, SOLUTION INTRAVENOUS; SUBCUTANEOUS at 18:47

## 2025-04-22 RX ADMIN — LIDOCAINE HYDROCHLORIDE 50 MG: 10 INJECTION, SOLUTION EPIDURAL; INFILTRATION; INTRACAUDAL; PERINEURAL at 22:22

## 2025-04-22 RX ADMIN — SODIUM CHLORIDE, PRESERVATIVE FREE 10 ML: 5 INJECTION INTRAVENOUS at 09:07

## 2025-04-22 RX ADMIN — ASPIRIN 325 MG: 325 TABLET, COATED ORAL at 22:19

## 2025-04-22 RX ADMIN — SODIUM CHLORIDE 3000 MG: 9 INJECTION, SOLUTION INTRAVENOUS at 22:20

## 2025-04-22 RX ADMIN — SODIUM CHLORIDE 3000 MG: 9 INJECTION, SOLUTION INTRAVENOUS at 15:14

## 2025-04-22 RX ADMIN — SODIUM CHLORIDE 3000 MG: 9 INJECTION, SOLUTION INTRAVENOUS at 02:17

## 2025-04-22 ASSESSMENT — PAIN SCALES - GENERAL: PAINLEVEL_OUTOF10: 0

## 2025-04-22 NOTE — CARE COORDINATION
DISCHARGE PLANNING:    DC plan to return home with family support.  Family will transport.  Watching ID recs, if IV atb therapy is needed, patient will need to go daily to the infusion center.  [facility] information for RX assistance supplied, alone with information for [facility] for primary care.  Patient has worked with financial counselor this admission.    #[phone]  Electronically signed by Leticia Vidal RN on 4/22/2025 at 3:23 PM

## 2025-04-22 NOTE — OP NOTE
[facility]          [address] [place] [address]                            OPERATIVE REPORT      PATIENT NAME: WILLIAN FIGUEROA                : 2000  MED REC NO: [id number]                      ROOM: [place] [address]  ACCOUNT NO: [id number]                       ADMIT DATE: 2025  PROVIDER: Aly Bourne MD      DATE OF PROCEDURE:  2025    SURGEON:  Aly Bourne MD    ASSISTANT:  Yossi surgical assistant.    PREOPERATIVE DIAGNOSIS:  Right diabetic foot deep infection with osteomyelitis and abscess, multiple areas.    POSTOPERATIVE DIAGNOSIS:  Right diabetic foot deep infection with osteomyelitis and abscess, multiple areas.    PROCEDURE DONE:  Incision and drainage, right foot deep abscess, multiple areas.    ANESTHESIA:  General anesthesia.    ESTIMATED BLOOD LOSS:  Minimal.    COMPLICATIONS:  None.    FINDINGS:  Improving multiple wounds with deep abscess, right foot.    INDICATION:  This is a 24-year-old white male, who presented with a diabetic foot infection and multiple areas of deep abscess deep in the foot as well as osteomyelitis.  This is a staged procedure, third time for an I and D of his right foot.  All risks, benefits, and alternatives were discussed with the patient, and he agreed to proceed with the surgical treatment.    DESCRIPTION OF PROCEDURE:  The patient's right foot was marked.  He was already on IV antibiotic.  The patient was then brought to the operating room and underwent general anesthesia.  The right foot was then prepped and draped in regular sterile routine fashion.  A time-out was called confirming the patient's name, site, and procedure.    We started with no tourniquet.  We performed excisional debridement including skin and subcutaneous tissue down to the fascia.  We irrigated the wound copiously with normal saline mixed with gentamicin.  There were multiple areas involved with the deep abscess.  We elected

## 2025-04-22 NOTE — BRIEF OP NOTE
Brief Postoperative Note      Patient: Reilly Alexander  YOB: 2000  MRN: [id number]    Date of Procedure: 4/21/2025    Pre-Op Diagnosis Codes:      * Abscess of right foot [L02.611]    Post-Op Diagnosis: Same       Procedure(s):  INCISION AND DRAINAGE RIGHT FOOT    Surgeon(s):  Aly Bourne MD    Assistant:  Surgical Assistant: Yossi Gao    Anesthesia: Choice    Estimated Blood Loss (mL): Minimal    Complications: None    Specimens:   * No specimens in log *    Implants:  * No implants in log *      Drains: * No LDAs found *    Findings:  Infection Present At Time Of Surgery (PATOS) (choose all levels that have infection present):  - Deep Infection (muscle/fascia) present as evidenced by fluid consistent with infection  Other Findings: Same.    Electronically signed by Aly Bourne MD on 4/22/2025 at 12:26 PM

## 2025-04-22 NOTE — DISCHARGE INSTR - COC
ORDERS:    Ertapenem 1 gm iv daily through 6/4/25  - Diagnosis - R DM foot infection / osteomyelitis   - First dose given in hospital  - Routine CVC care   - Labs: CBC w diff, CMP, ESR, CRP every Mon or Tue, FAX results to [phone]  - Call with antibiotic / infusion issues, [phone]  - Call with any change in status, transfer in or out of a facility or to hospital - [phone]  - Orders only valid for current disposition, NOT transferable upon discharge from facility or new admission to another facility.  - No f/u in outpatient ID office necessary    Familia Hill MD         Physician Certification: I certify the above information and transfer of Reilly Alexander  is necessary for the continuing treatment of the diagnosis listed and that he requires Home Care for greater 30 days.     Update Admission H&P: No change in H&P    PHYSICIAN SIGNATURE:  Electronically signed by Sandra Escobar MD on 4/23/25 at 8:34 AM EDT    Followup Dr Mercer in office in one week  [facility], [address] [place] [address], [address]   [id number],   [phone]

## 2025-04-23 ENCOUNTER — TELEPHONE (OUTPATIENT)
Dept: INFECTIOUS DISEASES | Age: 25
End: 2025-04-23

## 2025-04-23 VITALS
DIASTOLIC BLOOD PRESSURE: 93 MMHG | HEART RATE: 93 BPM | SYSTOLIC BLOOD PRESSURE: 131 MMHG | OXYGEN SATURATION: 97 % | HEIGHT: 74 IN | RESPIRATION RATE: 15 BRPM | WEIGHT: 200.4 LBS | TEMPERATURE: 98.1 F | BODY MASS INDEX: 25.72 KG/M2

## 2025-04-23 LAB
CK SERPL-CCNC: 13 U/L (ref 39–308)
GLUCOSE BLD-MCNC: 110 MG/DL (ref 70–99)
GLUCOSE BLD-MCNC: 263 MG/DL (ref 70–99)
PERFORMED ON: ABNORMAL
PERFORMED ON: ABNORMAL

## 2025-04-23 PROCEDURE — 6370000000 HC RX 637 (ALT 250 FOR IP): Performed by: ORTHOPAEDIC SURGERY

## 2025-04-23 PROCEDURE — 82550 ASSAY OF CK (CPK): CPT

## 2025-04-23 PROCEDURE — 2500000003 HC RX 250 WO HCPCS: Performed by: ORTHOPAEDIC SURGERY

## 2025-04-23 PROCEDURE — 2500000003 HC RX 250 WO HCPCS: Performed by: INTERNAL MEDICINE

## 2025-04-23 PROCEDURE — 99232 SBSQ HOSP IP/OBS MODERATE 35: CPT | Performed by: INTERNAL MEDICINE

## 2025-04-23 PROCEDURE — 6360000002 HC RX W HCPCS: Performed by: ORTHOPAEDIC SURGERY

## 2025-04-23 PROCEDURE — 36592 COLLECT BLOOD FROM PICC: CPT

## 2025-04-23 PROCEDURE — 94760 N-INVAS EAR/PLS OXIMETRY 1: CPT

## 2025-04-23 PROCEDURE — 6360000002 HC RX W HCPCS: Performed by: INTERNAL MEDICINE

## 2025-04-23 PROCEDURE — 2580000003 HC RX 258: Performed by: ORTHOPAEDIC SURGERY

## 2025-04-23 PROCEDURE — 2580000003 HC RX 258: Performed by: INTERNAL MEDICINE

## 2025-04-23 PROCEDURE — 36415 COLL VENOUS BLD VENIPUNCTURE: CPT

## 2025-04-23 RX ORDER — ONDANSETRON 2 MG/ML
8 INJECTION INTRAMUSCULAR; INTRAVENOUS
Status: CANCELLED | OUTPATIENT
Start: 2025-04-23

## 2025-04-23 RX ORDER — HYDROCORTISONE SODIUM SUCCINATE 100 MG/2ML
100 INJECTION INTRAMUSCULAR; INTRAVENOUS
Status: CANCELLED | OUTPATIENT
Start: 2025-04-23

## 2025-04-23 RX ORDER — DIPHENHYDRAMINE HYDROCHLORIDE 50 MG/ML
50 INJECTION, SOLUTION INTRAMUSCULAR; INTRAVENOUS
Status: CANCELLED | OUTPATIENT
Start: 2025-04-23

## 2025-04-23 RX ORDER — INSULIN GLARGINE 100 [IU]/ML
30 INJECTION, SOLUTION SUBCUTANEOUS NIGHTLY
Qty: 5 ADJUSTABLE DOSE PRE-FILLED PEN SYRINGE | Refills: 0 | Status: SHIPPED | OUTPATIENT
Start: 2025-04-23

## 2025-04-23 RX ORDER — SODIUM CHLORIDE 9 MG/ML
INJECTION, SOLUTION INTRAVENOUS CONTINUOUS
Status: CANCELLED | OUTPATIENT
Start: 2025-04-23

## 2025-04-23 RX ORDER — INSULIN GLARGINE 100 [IU]/ML
30 INJECTION, SOLUTION SUBCUTANEOUS NIGHTLY
Qty: 5 ADJUSTABLE DOSE PRE-FILLED PEN SYRINGE | Refills: 1 | Status: SHIPPED | OUTPATIENT
Start: 2025-04-23 | End: 2025-04-23

## 2025-04-23 RX ORDER — EPINEPHRINE 1 MG/ML
0.3 INJECTION, SOLUTION, CONCENTRATE INTRAVENOUS PRN
Status: CANCELLED | OUTPATIENT
Start: 2025-04-23

## 2025-04-23 RX ORDER — ACETAMINOPHEN 325 MG/1
650 TABLET ORAL
Status: CANCELLED | OUTPATIENT
Start: 2025-04-23

## 2025-04-23 RX ORDER — INSULIN LISPRO 100 [IU]/ML
12 INJECTION, SOLUTION INTRAVENOUS; SUBCUTANEOUS
Qty: 8 ADJUSTABLE DOSE PRE-FILLED PEN SYRINGE | Refills: 0 | Status: SHIPPED | OUTPATIENT
Start: 2025-04-23

## 2025-04-23 RX ORDER — ALBUTEROL SULFATE 90 UG/1
4 INHALANT RESPIRATORY (INHALATION) PRN
Status: CANCELLED | OUTPATIENT
Start: 2025-04-23

## 2025-04-23 RX ORDER — INSULIN LISPRO 100 [IU]/ML
12 INJECTION, SOLUTION INTRAVENOUS; SUBCUTANEOUS
Qty: 10 ADJUSTABLE DOSE PRE-FILLED PEN SYRINGE | Refills: 0 | Status: SHIPPED | OUTPATIENT
Start: 2025-04-23 | End: 2025-04-23

## 2025-04-23 RX ADMIN — INSULIN LISPRO 12 UNITS: 100 INJECTION, SOLUTION INTRAVENOUS; SUBCUTANEOUS at 13:44

## 2025-04-23 RX ADMIN — INSULIN LISPRO 4 UNITS: 100 INJECTION, SOLUTION INTRAVENOUS; SUBCUTANEOUS at 09:23

## 2025-04-23 RX ADMIN — OXYCODONE 5 MG: 5 TABLET ORAL at 10:40

## 2025-04-23 RX ADMIN — ASPIRIN 325 MG: 325 TABLET, COATED ORAL at 09:21

## 2025-04-23 RX ADMIN — INSULIN GLARGINE 30 UNITS: 100 INJECTION, SOLUTION SUBCUTANEOUS at 09:22

## 2025-04-23 RX ADMIN — SODIUM CHLORIDE, PRESERVATIVE FREE 10 ML: 5 INJECTION INTRAVENOUS at 09:22

## 2025-04-23 RX ADMIN — SODIUM CHLORIDE 3000 MG: 9 INJECTION, SOLUTION INTRAVENOUS at 02:50

## 2025-04-23 RX ADMIN — INSULIN LISPRO 12 UNITS: 100 INJECTION, SOLUTION INTRAVENOUS; SUBCUTANEOUS at 09:24

## 2025-04-23 RX ADMIN — ERTAPENEM SODIUM 1000 MG: 1 INJECTION INTRAMUSCULAR; INTRAVENOUS at 10:46

## 2025-04-23 NOTE — PLAN OF CARE
Problem: Chronic Conditions and Co-morbidities  Goal: Patient's chronic conditions and co-morbidity symptoms are monitored and maintained or improved  4/14/2025 0634 by Xin Cortez RN  Outcome: Progressing  Flowsheets (Taken 4/14/2025 0634)  Care Plan - Patient's Chronic Conditions and Co-Morbidity Symptoms are Monitored and Maintained or Improved:   Monitor and assess patient's chronic conditions and comorbid symptoms for stability, deterioration, or improvement   Collaborate with multidisciplinary team to address chronic and comorbid conditions and prevent exacerbation or deterioration  4/13/2025 1733 by Dillan Paulino, RN  Outcome: Progressing     Problem: Discharge Planning  Goal: Discharge to home or other facility with appropriate resources  4/14/2025 0634 by Xin Cortez RN  Outcome: Progressing  Flowsheets (Taken 4/14/2025 0634)  Discharge to home or other facility with appropriate resources:   Identify barriers to discharge with patient and caregiver   Identify discharge learning needs (meds, wound care, etc)   Arrange for needed discharge resources and transportation as appropriate   Refer to discharge planning if patient needs post-hospital services based on physician order or complex needs related to functional status, cognitive ability or social support system  4/13/2025 1733 by Dillan Paulino, RN  Outcome: Progressing     Problem: Pain  Goal: Verbalizes/displays adequate comfort level or baseline comfort level  4/14/2025 0634 by Xin Cortez, RN  Outcome: Progressing  Flowsheets (Taken 4/14/2025 0634)  Verbalizes/displays adequate comfort level or baseline comfort level: Encourage patient to monitor pain and request assistance  4/13/2025 1733 by Dillan Paulino, RN  Outcome: Progressing     
  Problem: Chronic Conditions and Co-morbidities  Goal: Patient's chronic conditions and co-morbidity symptoms are monitored and maintained or improved  4/14/2025 0843 by Nkechi Vincent, RN  Outcome: Progressing  Flowsheets (Taken 4/14/2025 0800)  Care Plan - Patient's Chronic Conditions and Co-Morbidity Symptoms are Monitored and Maintained or Improved:   Monitor and assess patient's chronic conditions and comorbid symptoms for stability, deterioration, or improvement   Collaborate with multidisciplinary team to address chronic and comorbid conditions and prevent exacerbation or deterioration   Update acute care plan with appropriate goals if chronic or comorbid symptoms are exacerbated and prevent overall improvement and discharge     Problem: Discharge Planning  Goal: Discharge to home or other facility with appropriate resources  4/14/2025 0843 by Nkechi Vincent, RN  Outcome: Progressing  Flowsheets (Taken 4/14/2025 0800)  Discharge to home or other facility with appropriate resources:   Identify barriers to discharge with patient and caregiver   Arrange for needed discharge resources and transportation as appropriate     Problem: Pain  Goal: Verbalizes/displays adequate comfort level or baseline comfort level  4/14/2025 0843 by Nkechi Vincent, RN  Outcome: Progressing  Flowsheets (Taken 4/14/2025 0800)  Verbalizes/displays adequate comfort level or baseline comfort level:   Encourage patient to monitor pain and request assistance   Assess pain using appropriate pain scale   Implement non-pharmacological measures as appropriate and evaluate response   Administer analgesics based on type and severity of pain and evaluate response     
  Problem: Chronic Conditions and Co-morbidities  Goal: Patient's chronic conditions and co-morbidity symptoms are monitored and maintained or improved  4/15/2025 0323 by Xin Cortez RN  Outcome: Progressing  Flowsheets (Taken 4/14/2025 2000)  Care Plan - Patient's Chronic Conditions and Co-Morbidity Symptoms are Monitored and Maintained or Improved:   Monitor and assess patient's chronic conditions and comorbid symptoms for stability, deterioration, or improvement   Collaborate with multidisciplinary team to address chronic and comorbid conditions and prevent exacerbation or deterioration   Update acute care plan with appropriate goals if chronic or comorbid symptoms are exacerbated and prevent overall improvement and discharge  4/15/2025 0323 by Xin Cortez RN  Outcome: Progressing  Flowsheets (Taken 4/14/2025 2000)  Care Plan - Patient's Chronic Conditions and Co-Morbidity Symptoms are Monitored and Maintained or Improved:   Monitor and assess patient's chronic conditions and comorbid symptoms for stability, deterioration, or improvement   Collaborate with multidisciplinary team to address chronic and comorbid conditions and prevent exacerbation or deterioration   Update acute care plan with appropriate goals if chronic or comorbid symptoms are exacerbated and prevent overall improvement and discharge     Problem: Discharge Planning  Goal: Discharge to home or other facility with appropriate resources  4/15/2025 0323 by Xin Cortez RN  Outcome: Progressing  Flowsheets (Taken 4/14/2025 2000)  Discharge to home or other facility with appropriate resources:   Identify barriers to discharge with patient and caregiver   Arrange for needed discharge resources and transportation as appropriate   Identify discharge learning needs (meds, wound care, etc)   Refer to discharge planning if patient needs post-hospital services based on physician order or complex needs related to functional status, cognitive ability 
  Problem: Chronic Conditions and Co-morbidities  Goal: Patient's chronic conditions and co-morbidity symptoms are monitored and maintained or improved  4/15/2025 0800 by Nkechi Vincent, RN  Outcome: Progressing  Flowsheets (Taken 4/15/2025 0754)  Care Plan - Patient's Chronic Conditions and Co-Morbidity Symptoms are Monitored and Maintained or Improved:   Monitor and assess patient's chronic conditions and comorbid symptoms for stability, deterioration, or improvement   Collaborate with multidisciplinary team to address chronic and comorbid conditions and prevent exacerbation or deterioration   Update acute care plan with appropriate goals if chronic or comorbid symptoms are exacerbated and prevent overall improvement and discharge       Problem: Discharge Planning  Goal: Discharge to home or other facility with appropriate resources  4/15/2025 0800 by Nkechi Vincent, RN  Outcome: Progressing  Flowsheets (Taken 4/15/2025 0754)  Discharge to home or other facility with appropriate resources:   Identify barriers to discharge with patient and caregiver   Arrange for needed discharge resources and transportation as appropriate   Identify discharge learning needs (meds, wound care, etc)       Problem: Pain  Goal: Verbalizes/displays adequate comfort level or baseline comfort level  4/15/2025 0800 by Nkechi Vincent, RN  Outcome: Progressing     Problem: Infection - Adult  Goal: Absence of infection at discharge  4/15/2025 0800 by Nkechi Vincent, RN  Outcome: Progressing  Flowsheets (Taken 4/15/2025 0754)  Absence of infection at discharge:   Assess and monitor for signs and symptoms of infection   Monitor lab/diagnostic results   Monitor all insertion sites i.e., indwelling lines, tubes and drains   Administer medications as ordered     Problem: Metabolic/Fluid and Electrolytes - Adult  Goal: Electrolytes maintained within normal limits  4/15/2025 0800 by Nkechi Vincent, RN  Outcome: Progressing  Flowsheets (Taken 
  Problem: Chronic Conditions and Co-morbidities  Goal: Patient's chronic conditions and co-morbidity symptoms are monitored and maintained or improved  4/19/2025 0333 by Kenisha Bertrand RN  Outcome: Progressing  Flowsheets (Taken 4/18/2025 2012)  Care Plan - Patient's Chronic Conditions and Co-Morbidity Symptoms are Monitored and Maintained or Improved: Monitor and assess patient's chronic conditions and comorbid symptoms for stability, deterioration, or improvement  4/18/2025 1754 by Anastacio Mittal RN  Outcome: Progressing  Flowsheets (Taken 4/16/2025 2014 by Irma Khan RN)  Care Plan - Patient's Chronic Conditions and Co-Morbidity Symptoms are Monitored and Maintained or Improved: Monitor and assess patient's chronic conditions and comorbid symptoms for stability, deterioration, or improvement     Problem: Discharge Planning  Goal: Discharge to home or other facility with appropriate resources  Outcome: Progressing  Flowsheets (Taken 4/18/2025 2012)  Discharge to home or other facility with appropriate resources: Identify barriers to discharge with patient and caregiver     Problem: Pain  Goal: Verbalizes/displays adequate comfort level or baseline comfort level  4/19/2025 0333 by Kenisha Bertrand RN  Outcome: Progressing  4/18/2025 1754 by Anastacio Mittal RN  Outcome: Progressing  Flowsheets (Taken 4/14/2025 2000 by Xin Cortez, RN)  Verbalizes/displays adequate comfort level or baseline comfort level:   Encourage patient to monitor pain and request assistance   Assess pain using appropriate pain scale   Implement non-pharmacological measures as appropriate and evaluate response   Administer analgesics based on type and severity of pain and evaluate response   Notify Licensed Independent Practitioner if interventions unsuccessful or patient reports new pain     Problem: Infection - Adult  Goal: Absence of infection at discharge  Outcome: Progressing  Flowsheets (Taken 4/18/2025 2012)  Absence of 
  Problem: Chronic Conditions and Co-morbidities  Goal: Patient's chronic conditions and co-morbidity symptoms are monitored and maintained or improved  4/19/2025 0955 by Monie Hong RN  Outcome: Progressing     Problem: Pain  Goal: Verbalizes/displays adequate comfort level or baseline comfort level  4/19/2025 0955 by Monie Hong RN  Outcome: Progressing     Problem: Skin/Tissue Integrity - Adult  Goal: Skin integrity remains intact  4/19/2025 0955 by Monie Hong RN  Outcome: Progressing     
  Problem: Chronic Conditions and Co-morbidities  Goal: Patient's chronic conditions and co-morbidity symptoms are monitored and maintained or improved  4/19/2025 2115 by Beatriz Peck RN  Outcome: Progressing  4/19/2025 0955 by Monie Hong RN  Outcome: Progressing     Problem: Discharge Planning  Goal: Discharge to home or other facility with appropriate resources  4/19/2025 2115 by Beatriz Peck RN  Outcome: Progressing  4/19/2025 0955 by Monie Hong RN  Outcome: Progressing     Problem: Pain  Goal: Verbalizes/displays adequate comfort level or baseline comfort level  4/19/2025 2115 by Beatriz Peck RN  Outcome: Progressing  4/19/2025 0955 by Monie Hong RN  Outcome: Progressing     Problem: Infection - Adult  Goal: Absence of infection at discharge  4/19/2025 2115 by Beatriz Peck RN  Outcome: Progressing  4/19/2025 0955 by Monie Hong RN  Outcome: Progressing     Problem: Metabolic/Fluid and Electrolytes - Adult  Goal: Electrolytes maintained within normal limits  4/19/2025 2115 by Beatriz Peck RN  Outcome: Progressing  4/19/2025 0955 by Monie Hong RN  Outcome: Progressing  Goal: Glucose maintained within prescribed range  4/19/2025 2115 by Beatriz Peck RN  Outcome: Progressing  4/19/2025 0955 by Mnoie Hong RN  Outcome: Progressing     Problem: ABCDS Injury Assessment  Goal: Absence of physical injury  4/19/2025 2115 by Beatriz Peck RN  Outcome: Progressing  4/19/2025 0955 by Monie Hong RN  Outcome: Progressing     Problem: Safety - Adult  Goal: Free from fall injury  4/19/2025 2115 by Beatriz Peck RN  Outcome: Progressing  4/19/2025 0955 by Monie Hong RN  Outcome: Progressing     Problem: Skin/Tissue Integrity - Adult  Goal: Skin integrity remains intact  4/19/2025 2115 by Beatriz Peck RN  Outcome: Progressing  4/19/2025 0955 by Monie Hong RN  Outcome: Progressing     Problem: Musculoskeletal - Adult  Goal: Return mobility to 
  Problem: Chronic Conditions and Co-morbidities  Goal: Patient's chronic conditions and co-morbidity symptoms are monitored and maintained or improved  4/20/2025 1035 by Monie Hong RN  Outcome: Progressing     Problem: Pain  Goal: Verbalizes/displays adequate comfort level or baseline comfort level  4/20/2025 1035 by Monie Hong RN  Outcome: Progressing     Problem: Skin/Tissue Integrity - Adult  Goal: Skin integrity remains intact  4/20/2025 1035 by Monie Hong RN  Outcome: Progressing     Problem: Safety - Adult  Goal: Free from fall injury  4/20/2025 1035 by Monie Hong RN  Outcome: Progressing     
  Problem: Chronic Conditions and Co-morbidities  Goal: Patient's chronic conditions and co-morbidity symptoms are monitored and maintained or improved  4/20/2025 2338 by Beatriz Peck RN  Outcome: Progressing  4/20/2025 1035 by Monie Hong RN  Outcome: Progressing     Problem: Discharge Planning  Goal: Discharge to home or other facility with appropriate resources  4/20/2025 2338 by Beatriz Peck RN  Outcome: Progressing  4/20/2025 1035 by Monie Hong RN  Outcome: Progressing     Problem: Pain  Goal: Verbalizes/displays adequate comfort level or baseline comfort level  4/20/2025 2338 by Beatriz Peck RN  Outcome: Progressing  4/20/2025 1035 by Monie Hong RN  Outcome: Progressing     Problem: Infection - Adult  Goal: Absence of infection at discharge  4/20/2025 2338 by Beatriz Peck RN  Outcome: Progressing  4/20/2025 1035 by Monie Hong RN  Outcome: Progressing     Problem: Metabolic/Fluid and Electrolytes - Adult  Goal: Electrolytes maintained within normal limits  4/20/2025 2338 by Beatriz Peck RN  Outcome: Progressing  4/20/2025 1035 by Monie Hong RN  Outcome: Progressing  Goal: Glucose maintained within prescribed range  4/20/2025 2338 by Beatriz Peck RN  Outcome: Progressing  4/20/2025 1035 by Monie Hong RN  Outcome: Progressing     Problem: ABCDS Injury Assessment  Goal: Absence of physical injury  4/20/2025 2338 by Beatriz Peck RN  Outcome: Progressing  4/20/2025 1035 by Monie Hong RN  Outcome: Progressing     Problem: Safety - Adult  Goal: Free from fall injury  4/20/2025 2338 by Beatriz Peck RN  Outcome: Progressing  4/20/2025 1035 by Monie Hong RN  Outcome: Progressing     Problem: Skin/Tissue Integrity - Adult  Goal: Skin integrity remains intact  4/20/2025 2338 by Beatriz Peck RN  Outcome: Progressing  4/20/2025 1035 by Monie Hong RN  Outcome: Progressing     Problem: Musculoskeletal - Adult  Goal: Return mobility to 
  Problem: Chronic Conditions and Co-morbidities  Goal: Patient's chronic conditions and co-morbidity symptoms are monitored and maintained or improved  4/21/2025 1208 by Joya Kwok RN  Outcome: Progressing  Flowsheets (Taken 4/21/2025 1208)  Care Plan - Patient's Chronic Conditions and Co-Morbidity Symptoms are Monitored and Maintained or Improved:   Monitor and assess patient's chronic conditions and comorbid symptoms for stability, deterioration, or improvement   Collaborate with multidisciplinary team to address chronic and comorbid conditions and prevent exacerbation or deterioration   Update acute care plan with appropriate goals if chronic or comorbid symptoms are exacerbated and prevent overall improvement and discharge     Problem: Discharge Planning  Goal: Discharge to home or other facility with appropriate resources  4/21/2025 1208 by Joya Kwok RN  Outcome: Progressing  Note: Prior to admission pt was living independently. He has support from his mom, dad and siblings. Plan is to return to an independent environment at discharge.      Problem: Pain  Goal: Verbalizes/displays adequate comfort level or baseline comfort level  4/21/2025 1208 by Joya Kwko RN  Outcome: Progressing  Note: Pain/discomfort being managed with PRN analgesics per MD orders. Pt able to express presence and absence of pain and rate pain appropriately using numerical scale. Pt has offered no complaints of pain. Pt has no difficulty expressing his needs.     Problem: Infection - Adult  Goal: Absence of infection at discharge  4/21/2025 1208 by Joya Kwok RN  Outcome: Progressing  Note: Pt continues to receive IV antibiotics. Pt is afebrile. Plan is for an IandD of his foot wound today. Labs being monitored.      Problem: Metabolic/Fluid and Electrolytes - Adult  Goal: Electrolytes maintained within normal limits  4/21/2025 1208 by Joya Kwok, RN  Outcome: Progressing  Note: Labs being monitored. No need 
  Problem: Chronic Conditions and Co-morbidities  Goal: Patient's chronic conditions and co-morbidity symptoms are monitored and maintained or improved  4/22/2025 2355 by Roman Gonzalez RN  Outcome: Progressing  Flowsheets (Taken 4/22/2025 2059)  Care Plan - Patient's Chronic Conditions and Co-Morbidity Symptoms are Monitored and Maintained or Improved: Monitor and assess patient's chronic conditions and comorbid symptoms for stability, deterioration, or improvement     Problem: Discharge Planning  Goal: Discharge to home or other facility with appropriate resources  4/22/2025 2355 by Roman Gonzalez RN  Outcome: Progressing  Flowsheets (Taken 4/22/2025 2059)  Discharge to home or other facility with appropriate resources: Identify barriers to discharge with patient and caregiver     Problem: Pain  Goal: Verbalizes/displays adequate comfort level or baseline comfort level  4/22/2025 2355 by Roman Gonzalez RN  Outcome: Progressing     Problem: Metabolic/Fluid and Electrolytes - Adult  Goal: Electrolytes maintained within normal limits  4/22/2025 2355 by Roman Gonzalez RN  Outcome: Progressing  Flowsheets (Taken 4/22/2025 2059)  Electrolytes maintained within normal limits: Monitor labs and assess patient for signs and symptoms of electrolyte imbalances     Problem: Metabolic/Fluid and Electrolytes - Adult  Goal: Glucose maintained within prescribed range  4/22/2025 2355 by Roman Gonzalez RN  Outcome: Progressing  Flowsheets (Taken 4/22/2025 2059)  Glucose maintained within prescribed range: Monitor blood glucose as ordered     Problem: ABCDS Injury Assessment  Goal: Absence of physical injury  4/22/2025 2355 by Roman Gonzalez RN  Outcome: Progressing     Problem: Musculoskeletal - Adult  Goal: Return mobility to safest level of function  4/22/2025 2355 by Roman Gonzalez RN  Outcome: Progressing  Flowsheets (Taken 4/22/2025 2059)  Return Mobility to Safest Level of Function: Assess 
  Problem: Chronic Conditions and Co-morbidities  Goal: Patient's chronic conditions and co-morbidity symptoms are monitored and maintained or improved  Outcome: Progressing     Problem: Discharge Planning  Goal: Discharge to home or other facility with appropriate resources  Outcome: Progressing     Problem: Pain  Goal: Verbalizes/displays adequate comfort level or baseline comfort level  Outcome: Progressing     
  Problem: Chronic Conditions and Co-morbidities  Goal: Patient's chronic conditions and co-morbidity symptoms are monitored and maintained or improved  Outcome: Progressing     Problem: Discharge Planning  Goal: Discharge to home or other facility with appropriate resources  Outcome: Progressing     Problem: Pain  Goal: Verbalizes/displays adequate comfort level or baseline comfort level  Outcome: Progressing     Problem: Infection - Adult  Goal: Absence of infection at discharge  Outcome: Progressing     Problem: Metabolic/Fluid and Electrolytes - Adult  Goal: Electrolytes maintained within normal limits  Outcome: Progressing     Problem: Metabolic/Fluid and Electrolytes - Adult  Goal: Glucose maintained within prescribed range  Outcome: Progressing     
  Problem: Chronic Conditions and Co-morbidities  Goal: Patient's chronic conditions and co-morbidity symptoms are monitored and maintained or improved  Outcome: Progressing     Problem: Discharge Planning  Goal: Discharge to home or other facility with appropriate resources  Outcome: Progressing     Problem: Pain  Goal: Verbalizes/displays adequate comfort level or baseline comfort level  Outcome: Progressing     Problem: Infection - Adult  Goal: Absence of infection at discharge  Outcome: Progressing     Problem: Metabolic/Fluid and Electrolytes - Adult  Goal: Electrolytes maintained within normal limits  Outcome: Progressing  Goal: Glucose maintained within prescribed range  Outcome: Progressing     Problem: ABCDS Injury Assessment  Goal: Absence of physical injury  Outcome: Progressing     Problem: Safety - Adult  Goal: Free from fall injury  Outcome: Progressing     Problem: Skin/Tissue Integrity - Adult  Goal: Skin integrity remains intact  Outcome: Progressing     Problem: Musculoskeletal - Adult  Goal: Return mobility to safest level of function  Outcome: Progressing     Problem: Nutrition Deficit:  Goal: Optimize nutritional status  Outcome: Progressing  Flowsheets (Taken 4/22/2025 1254 by Kristina Bucio, RD, LD)  Nutrient intake appropriate for improving, restoring, or maintaining nutritional needs:   Recommend appropriate diets, oral nutritional supplements, and vitamin/mineral supplements   Monitor oral intake, labs, and treatment plans     
  Problem: Chronic Conditions and Co-morbidities  Goal: Patient's chronic conditions and co-morbidity symptoms are monitored and maintained or improved  Outcome: Progressing  Flowsheets (Taken 4/16/2025 2014 by Irma Khan, RN)  Care Plan - Patient's Chronic Conditions and Co-Morbidity Symptoms are Monitored and Maintained or Improved: Monitor and assess patient's chronic conditions and comorbid symptoms for stability, deterioration, or improvement     Problem: Pain  Goal: Verbalizes/displays adequate comfort level or baseline comfort level  Outcome: Progressing  Flowsheets (Taken 4/14/2025 2000 by Xin Cortez, RN)  Verbalizes/displays adequate comfort level or baseline comfort level:   Encourage patient to monitor pain and request assistance   Assess pain using appropriate pain scale   Implement non-pharmacological measures as appropriate and evaluate response   Administer analgesics based on type and severity of pain and evaluate response   Notify Licensed Independent Practitioner if interventions unsuccessful or patient reports new pain     Problem: Musculoskeletal - Adult  Goal: Return mobility to safest level of function  Outcome: Progressing  Flowsheets (Taken 4/18/2025 1754)  Return Mobility to Safest Level of Function:   Assist with transfers and ambulation using safe patient handling equipment as needed   Assess patient stability and activity tolerance for standing, transferring and ambulating with or without assistive devices   Anastacio Mittal RN  4/18/2025    
  Problem: Chronic Conditions and Co-morbidities  Goal: Patient's chronic conditions and co-morbidity symptoms are monitored and maintained or improved  Outcome: Progressing  Flowsheets (Taken 4/16/2025 2014)  Care Plan - Patient's Chronic Conditions and Co-Morbidity Symptoms are Monitored and Maintained or Improved: Monitor and assess patient's chronic conditions and comorbid symptoms for stability, deterioration, or improvement     Problem: Discharge Planning  Goal: Discharge to home or other facility with appropriate resources  Outcome: Progressing  Flowsheets (Taken 4/16/2025 2014)  Discharge to home or other facility with appropriate resources: Identify barriers to discharge with patient and caregiver     Problem: Pain  Goal: Verbalizes/displays adequate comfort level or baseline comfort level  Outcome: Progressing     Problem: Infection - Adult  Goal: Absence of infection at discharge  Outcome: Progressing  Flowsheets (Taken 4/16/2025 2014)  Absence of infection at discharge: Assess and monitor for signs and symptoms of infection     Problem: Metabolic/Fluid and Electrolytes - Adult  Goal: Electrolytes maintained within normal limits  Outcome: Progressing  Flowsheets (Taken 4/16/2025 2014)  Electrolytes maintained within normal limits: Monitor labs and assess patient for signs and symptoms of electrolyte imbalances  Goal: Glucose maintained within prescribed range  Outcome: Progressing     Problem: ABCDS Injury Assessment  Goal: Absence of physical injury  Outcome: Progressing     Problem: Safety - Adult  Goal: Free from fall injury  Outcome: Progressing     
  Problem: Discharge Planning  Goal: Discharge to home or other facility with appropriate resources  4/15/2025 2310 by Roman Gonzalez RN  Outcome: Progressing     
  Problem: Discharge Planning  Goal: Discharge to home or other facility with appropriate resources  4/17/2025 0952 by Karmen Sue, RN  Outcome: Progressing  4/17/2025 0125 by Irma Khan, RN  Outcome: Progressing  Flowsheets (Taken 4/16/2025 2014)  Discharge to home or other facility with appropriate resources: Identify barriers to discharge with patient and caregiver     
  Problem: Pain  Goal: Verbalizes/displays adequate comfort level or baseline comfort level  Outcome: Progressing     Problem: Infection - Adult  Goal: Absence of infection at discharge  Outcome: Progressing  Flowsheets (Taken 4/18/2025 0000)  Absence of infection at discharge:   Assess and monitor for signs and symptoms of infection   Monitor lab/diagnostic results   Monitor all insertion sites i.e., indwelling lines, tubes and drains   Administer medications as ordered     Problem: Metabolic/Fluid and Electrolytes - Adult  Goal: Electrolytes maintained within normal limits  Outcome: Progressing     
(Taken 4/21/2025 5792)  Free From Fall Injury: Instruct family/caregiver on patient safety

## 2025-04-23 NOTE — DISCHARGE SUMMARY
V2.0  Discharge Summary    Name:  Reilly Alexander /Age/Sex: 2000 (24 y.o. male)   Admit Date: 2025  Discharge Date: 25    MRN & CSN:  [id number] & [id number] Encounter Date and Time 25 8:35 AM EDT    Attending:  Sandra Escobar MD Discharging Provider: Sandra Escobar MD       Hospital Course:     Brief HPI: Reilly Alexander is a 24 y.o. male with history of type 1 diabetes presented to hospital with DKA, treated with insulin infusion in ICU and transition to basal and bolus insulin later.  Hemoglobin A1c elevated at 11.6%.  Patient was not able to afford insulin so he was not taking any insulin.  Patient also found to have wound on the right foot which required multiple debridements in hospital.  Status post I&D on , ,  by Ortho.  Cleared from Ortho standpoint for discharge with follow-up recommendations in the office.  Cultures sent from surgery noted to be polymicrobial, on Unasyn in hospital and ID recommended to switch ertapenem on discharge.  Patient is planning to get antibiotics from infusion center.  Discharged home with home health.  Referral given for [facility] wound care for follow-up.    Brief Problem Based Course:   Type 1 diabetes with DKA  Right foot osteomyelitis with deep abscess  Electrolyte imbalance with hypomagnesemia, hypophosphatemia  Hyponatremia  Nicotine use disorder      The patient expressed appropriate understanding of, and agreement with the discharge recommendations, medications, and plan.     Consults this admission:  PHARMACY TO DOSE VANCOMYCIN  IP CONSULT TO HOSPITALIST  PHARMACY TO DOSE VANCOMYCIN  IP CONSULT TO INFECTIOUS DISEASES  IP CONSULT TO DIABETES EDUCATOR  IP CONSULT TO PODIATRY  IP CONSULT TO SOCIAL WORK  IP CONSULT TO SOCIAL WORK  IP CONSULT TO VASCULAR SURGERY  IP CONSULT TO PODIATRY  IP CONSULT TO SOCIAL WORK  IP CONSULT TO SOCIAL WORK  IP CONSULT TO SOCIAL WORK  IP CONSULT TO SOCIAL WORK  IP CONSULT TO VASCULAR ACCESS TEAM  IP CONSULT TO

## 2025-04-23 NOTE — CARE COORDINATION
CASE MANAGEMENT DISCHARGE SUMMARY:    DISCHARGE DATE: 4/23/2025    DISCHARGED TO: home     TRANSPORTATION: via family             TIME: TBD by patient and bedside RN    COMMENTS: Patient, patient's family, bedside RN, and facility aware of discharge plan and timeline.    IVAB orders confirmed with the infusion center. Referral in place for [facility] for PCP follow up. Patient education provided by ortho on wound dressing changes and supplies provided to patient at bedside.     No additional discharge needs identified at this time.    Electronically signed by LOULOU ESCOTO RN on 4/23/2025 at 1:50 PM

## 2025-04-23 NOTE — PROGRESS NOTES
Infectious Diseases Inpatient Progress Note      CHIEF COMPLAINT:     Rt foot ulcer  Sepsis  Fevers  Rt foot abscess  DKA  Right foot osteomyelitis  Status post right foot fifth toe amputation  Group B strep infection    HISTORY OF PRESENT ILLNESS:  24 y.o. male with significant history for diabetes longstanding patient unfortunately not been on medication lack of insurance.  Patient is from [place] was living in [place] for 4 years now relocating to [place], admitted to hospital secondary to DKA with elevated blood sugars and fevers found to have severe right foot infection with cellulitis.  Patient has a right foot plantar ulcer no ongoing drainage with right fifth toe ongoing maceration wound drainage concern for deep infection versus osteomyelitis.  Labs indicate sodium was 123 creatinine 0.8 hemoglobin A1c 11  procalcitonin 0.29 and beta hydroxybutyrate acid elevated 6.5 ,  serum  pH of 7.098.  X-ray of right foot soft tissue swelling.  We are consulted for antibiotic recommendations.    Interval History : Now transferred to progressive care unit for right foot swelling and redness slowly improving wound cultures noted group B strep seen by orthopedic surgery MRI indicating clear-cut osteomyelitis septic joint involving the right fifth metatarsal patient underwent right foot incision and drainage and removal of osteomyelitic down to the  bone cortex of the fifth metatarsal by  on      Past Medical History:    Longstanding diabetes type 1    Past Surgical History:    Past Surgical History:   Procedure Laterality Date    FOOT DEBRIDEMENT Right 4/16/2025    INCISION AND DRAINAGE RIGHT FOOT OSTEOMYELITIS performed by Aly Bourne MD at [facility] OR       Current Medications:    Outpatient Medications Marked as Taking for the 4/13/25 encounter (Hospital Encounter)   Medication Sig Dispense Refill    glucose monitoring kit (FREESTYLE) monitoring kit 1 kit by Does not apply route daily 1 kit 
      Infectious Diseases Inpatient Progress Note      CHIEF COMPLAINT:     Rt foot ulcer  Sepsis  Fevers  Rt foot abscess  DKA  Right foot osteomyelitis  Status post right foot fifth toe debridement  Group B strep infection    HISTORY OF PRESENT ILLNESS:  24 y.o. male with significant history for diabetes longstanding patient unfortunately not been on medication lack of insurance.  Patient is from [place] was living in [place] for 4 years now relocating to [place], admitted to hospital secondary to DKA with elevated blood sugars and fevers found to have severe right foot infection with cellulitis.  Patient has a right foot plantar ulcer no ongoing drainage with right fifth toe ongoing maceration wound drainage concern for deep infection versus osteomyelitis.  Labs indicate sodium was 123 creatinine 0.8 hemoglobin A1c 11  procalcitonin 0.29 and beta hydroxybutyrate acid elevated 6.5 ,  serum  pH of 7.098.  X-ray of right foot soft tissue swelling.  We are consulted for antibiotic recommendations.    Interval History : Sugars slowly improving complains of right leg pain status post incision and drainage of the right foot down to cortex of the right fifth metatarsal with excisional debridement operative culture in process      Past Medical History:    Longstanding diabetes type 1    Past Surgical History:    Past Surgical History:   Procedure Laterality Date    FOOT DEBRIDEMENT Right 4/16/2025    INCISION AND DRAINAGE RIGHT FOOT OSTEOMYELITIS performed by Aly Bourne MD at [facility] OR       Current Medications:    Outpatient Medications Marked as Taking for the 4/13/25 encounter (Hospital Encounter)   Medication Sig Dispense Refill    glucose monitoring kit (FREESTYLE) monitoring kit 1 kit by Does not apply route daily 1 kit 0    blood glucose test strips (RELION GLUCOSE TEST STRIPS) strip 1 each by In Vitro route 4 times daily As needed. 100 each 3    Insulin Pen Needle (B-D ULTRAFINE III SHORT PEN) 31G 
      Infectious Diseases Inpatient Progress Note      CHIEF COMPLAINT:     Rt foot ulcer  Sepsis  Fevers  Rt foot abscess  DKA  Right foot osteomyelitis  Status post right foot fifth toe debridement  Group B strep infection    HISTORY OF PRESENT ILLNESS:  24 y.o. male with significant history for diabetes longstanding patient unfortunately not been on medication lack of insurance.  Patient is from [place] was living in [place] for 4 years now relocating to [place], admitted to hospital secondary to DKA with elevated blood sugars and fevers found to have severe right foot infection with cellulitis.  Patient has a right foot plantar ulcer no ongoing drainage with right fifth toe ongoing maceration wound drainage concern for deep infection versus osteomyelitis.  Labs indicate sodium was 123 creatinine 0.8 hemoglobin A1c 11  procalcitonin 0.29 and beta hydroxybutyrate acid elevated 6.5 ,  serum  pH of 7.098.  X-ray of right foot soft tissue swelling.  We are consulted for antibiotic recommendations.    Interval History : Complains of ongoing left foot swelling sugars intermittently elevated creatinine stable tolerating it by therapy okay surgical culture positive for group B strep s/p repeat ID today and per oR report he may need more surgery vs amputation patient aware       Past Medical History:    Longstanding diabetes type 1    Past Surgical History:    Past Surgical History:   Procedure Laterality Date    FOOT DEBRIDEMENT Right 4/16/2025    INCISION AND DRAINAGE RIGHT FOOT OSTEOMYELITIS performed by Aly Bourne MD at [facility] OR    FOOT DEBRIDEMENT Right 4/18/2025    INCISION AND DRAINAGE RIGHT FOOT performed by Aly Bourne MD at [facility] OR       Current Medications:    Outpatient Medications Marked as Taking for the 4/13/25 encounter (Hospital Encounter)   Medication Sig Dispense Refill    glucose monitoring kit (FREESTYLE) monitoring kit 1 kit by Does not apply route daily 1 kit 0    blood glucose 
    V2.0    [facility] [place]      Name:  Reilly Alexander /Age/Sex: 2000  (24 y.o. male)   MRN & CSN:  [id number] & [id number] Encounter Date/Time: 2025 8:07 AM EDT   Location:  [id number] PCP: No primary care provider on file.     Attending:James Irizarry MD       Hospital Day: 2    Assessment and Recommendations   Reilly Alexander is a 24 y.o. male who presents with DKA, type 1, not at goal (HCC)      Plan:      DKA, severe, a patient was not taking his insulin, also had skin infection likely triggered by DKA on presentation, admitted to ICU IV fluid bolus and maintenance, IV insulin, overall improving still acidotic, even though anion gap closed, changed to subcu insulin diet advanced discussed with nursing repeat BMP  Diabetic foot infection, IV antibiotics with Unasyn and vancomycin at this time ID consulted will follow further recommendations  Sepsis due to diabetic foot infection, IV antibiotics as above procalcitonin minimally elevated on admission podiatry consulted white count improved today down to 10.5 from 16.4  Hyponatremia combination of hypovolemic hyponatremia and pseudohyponatremia due to elevated blood sugar, IV fluid overall improved  Generalized weakness secondary to above, improving  Hypophosphatemia replace per protocol, 1.1 today  Hypokalemia replace per protocol, 3.2 today  Diarrhea, no abdominal pain, will check C. difficile    Total critical care time including but not limited to ordering and follow-up on critical labs ordering critical IV medication discussion with other subspecialty, not including any procedure time, time is cumulative, 33 minutes  Diet ADULT DIET; Regular; 5 carb choices (75 gm/meal)   DVT Prophylaxis [] Lovenox, []  Heparin, [] SCDs, [] Ambulation,  [] Eliquis, [] Xarelto  [] Coumadin   Code Status Full Code             Personally reviewed Lab Studies and Imaging     Discussed management of the case with infectious disease who recommended IV 
    V2.0    [facility] [place] Progress Note      Name:  Reilly Alexander /Age/Sex: 2000  (24 y.o. male)   MRN & CSN:  [id number] & [id number] Encounter Date/Time: 2025 3:49 PM EDT   Location:  OR/NONE PCP: No primary care provider on file.     Attending:Sandra Escobar MD       Hospital Day: 9    Assessment and Recommendations   Reilly Alexander is a 24 y.o. male admitted with DKA,, treated with insulin drip.  Also being treated for acute osteomyelitis of the right foot.    Plan:   Type 1 diabetes with DKA, status post insulin gtt.  Currently on basal and mealtime coverage insulin.  Continue monitoring and adjust insulin as needed.  Right foot osteomyelitis with deep abscess.  Status post I&D on , , scheduled for staged surgery today.  NPO.  Cultures so far negative for Streptococcus dysgalactiae, strep agalactia, finegoldia, eggerthella.  Antibiotics per ID.  Electrolyte imbalance with hypomagnesemia, replaced, hypophosphatemia, replace.  Hyponatremia thought to be combination of hypovolemic hyponatremia and pseudohyponatremia.  Overall improving.  Generalized weakness, improving  Nicotine dependency, offered nicotine replacement  Diarrhea, resolved      Diet ADULT DIET; Regular; 4 carb choices (60 gm/meal)   DVT Prophylaxis [x] Lovenox, []  Heparin, [] SCDs, [] Ambulation,  [] Eliquis, [] Xarelto  [] Coumadin   Code Status Full Code             Personally reviewed Lab Studies and Imaging   Drugs that require monitoring for toxicity include subcutaneous insulin and the method of monitoring was POCT glucose monitoring.  IV Unasyn, monitoring renal function with BMP.    Medical Decision Making:  The following items were considered in medical decision making:  Discussion of patient care with other providers  Reviewed clinical lab tests  Reviewed radiology tests  Reviewed other diagnostic tests/interventions  Independent review of radiologic images  Microbiology cultures and other micro tests reviewed      Subjective: 
    V2.0    [facility] [place] Progress Note      Name:  Reilly Alexander /Age/Sex: 2000  (24 y.o. male)   MRN & CSN:  [id number] & [id number] Encounter Date/Time: 2025 3:49 PM EDT   Location:  [id number]/5114-01 PCP: No primary care provider on file.     Attending:Sandra Escobar MD       Hospital Day: 10    Assessment and Recommendations   Reilly Alexander is a 24 y.o. male admitted with DKA, treated with insulin drip.  Also being treated for acute osteomyelitis of the right foot.    Plan:   Type 1 diabetes with DKA, status post insulin gtt. Currently on basal and mealtime coverage insulin. Continue monitoring and adjust insulin as needed.  Right foot osteomyelitis with deep abscess. Status post I&D on , , and . Cultures so far negative for Streptococcus dysgalactiae, strep agalactia, finegoldia, eggerthella. Continue Unasyn. Surgical bone biopsy results pending, if no bony involvement might be able to discharge on oral antibiotics. Will follow-up with ID recommendations.  Electrolyte imbalance with hypomagnesemia, replaced, hypophosphatemia, replace.  Hyponatremia, resolved.   Generalized weakness, improving  Nicotine dependency, offered nicotine replacement  Diarrhea, resolved      Diet ADULT ORAL NUTRITION SUPPLEMENT; Lunch, Dinner; Wound Healing Oral Supplement  ADULT DIET; Regular; 5 carb choices (75 gm/meal)   DVT Prophylaxis [x] Lovenox, []  Heparin, [] SCDs, [] Ambulation,  [] Eliquis, [] Xarelto  [] Coumadin   Code Status Full Code             Personally reviewed Lab Studies and Imaging   Drugs that require monitoring for toxicity include subcutaneous insulin and the method of monitoring was POCT glucose monitoring.  IV Unasyn, monitoring renal function with BMP.    Medical Decision Making:  The following items were considered in medical decision making:  Discussion of patient care with other providers  Reviewed clinical lab tests  Reviewed radiology tests  Reviewed other diagnostic 
    V2.0    [facility] [place] Progress Note      Name:  Reilly Alexander /Age/Sex: 2000  (24 y.o. male)   MRN & CSN:  [id number] & [id number] Encounter Date/Time: 4/15/2025 9:13 AM EDT   Location:  O5O-1274 PCP: No primary care provider on file.     Attending:James Irizarry MD       Hospital Day: 3    Assessment and Recommendations   Reilly Alexander is a 24 y.o. male who presents with DKA, type 1, not at goal (HCC)      Plan:     DKA, severe, a patient was not taking his insulin, also had skin infection likely triggered by DKA on presentation, admitted to ICU IV fluid bolus and maintenance, IV insulin, overall improving changed to subcu insulin long and short acting adjusting dose today A1c significantly elevated on admission  Diabetic foot infection, IV antibiotics with Unasyn and vancomycin at this time ID consulted and vancomycin changed to daptomycin, MRI foot ordered and pending  Sepsis due to diabetic foot infection, IV antibiotics as above procalcitonin minimally elevated on admission podiatry consulted white count improved currently on meropenem Unasyn MRI pending   Hyponatremia combination of hypovolemic hyponatremia and pseudohyponatremia due to elevated blood sugar, IV fluid overall improved.  Generalized weakness secondary to above, continue to improve.  Hypophosphatemia replace per protocol.  Hypokalemia replace per protocol.  Diarrhea, no abdominal pain, C. difficile ordered on admission    Total critical care time including but not limited to ordering and following on critical labs ordering critical IV medication in a patient with potential life-threatening complications, discussion with our subspecialty not including any procedure time 34 minutes  Diet ADULT DIET; Regular; 5 carb choices (75 gm/meal)   DVT Prophylaxis [] Lovenox, []  Heparin, [] SCDs, [] Ambulation,  [] Eliquis, [] Xarelto  [] Coumadin   Code Status Full Code             Personally reviewed Lab Studies and Imaging     Discussed 
    V2.0    [facility] [place]      Name:  Reilly Alexander /Age/Sex: 2000  (24 y.o. male)   MRN & CSN:  [id number] & [id number] Encounter Date/Time: 2025 12:47 PM EDT   Location:  [id number]/5114-01 PCP: No primary care provider on file.     Attending:James Irizarry MD       Hospital Day: 7    Assessment and Recommendations   Reilly Alexander is a 24 y.o. male who presents with DKA, type 1, not at goal (HCC)    24-year-old male with diabetes presented to the hospital with generalized weakness worsening right lower extremity wound found to be in DKA admitted to ICU received IV fluid IV insulin Ortho consulted ID consulted underwent to I&D so far continue to be on IV antibiotics transferred to PCU after t transitioning to subcu insulin  Plan:     DKA, severe, a patient was not taking his insulin, also had skin infection likely triggered by DKA on presentation, admitted to ICU IV fluid bolus and maintenance, IV insulin, overall improving changed to subcu insulin long and short acting blood sugar still not optimally controlled worsening by stress related to surgical intervention adjusting long and short acting insulin added preprandial insulin.  Increased blood sugar today will adjust short and long-acting insulin  Diabetic foot infection with osteomyelitis, IV antibiotics with Unasyn and vancomycin at this time ID consulted and vancomycin changed to daptomycin, then discontinued MRI foot ordered and noted with osteomyelitis Ortho consulted postop on  and , Ortho continue to follow  Sepsis due to diabetic foot infection, IV antibiotics as above procalcitonin minimally elevated on admission podiatry consulted white count improved currently on Unasyn MRI noted.  Status post I&D and excisional debridement on  and .  Hyponatremia combination of hypovolemic hyponatremia and pseudohyponatremia due to elevated blood sugar, IV fluid overall improved.  Wound culture positive for Finegoldia 
    V2.0    [facility] [place] Progress Note      Name:  Reilly Alexander /Age/Sex: 2000  (24 y.o. male)   MRN & CSN:  [id number] & [id number] Encounter Date/Time: 2025 12:40 PM EDT   Location:  [id number]/5114-01 PCP: No primary care provider on file.     Attending:James Irizarry MD       Hospital Day: 6    Assessment and Recommendations   Reilly Alexander is a 24 y.o. male who presents with DKA, type 1, not at goal (HCC)      Plan:     DKA, severe, a patient was not taking his insulin, also had skin infection likely triggered by DKA on presentation, admitted to ICU IV fluid bolus and maintenance, IV insulin, overall improving changed to subcu insulin long and short acting blood sugar still not optimally controlled worsening by stress related to surgical intervention adjusting long and short acting insulin added preprandial insulin.    Diabetic foot infection with osteomyelitis, IV antibiotics with Unasyn and vancomycin at this time ID consulted and vancomycin changed to daptomycin, then discontinued MRI foot ordered and noted with osteomyelitis Ortho consulted postop on  and   Sepsis due to diabetic foot infection, IV antibiotics as above procalcitonin minimally elevated on admission podiatry consulted white count improved currently on Unasyn MRI noted.  Status post I&D and excisional debridement.  Hyponatremia combination of hypovolemic hyponatremia and pseudohyponatremia due to elevated blood sugar, IV fluid overall improved.  Wound culture positive for Finegoldia magna and Streptococcus  Generalized weakness secondary to above, continue to improve.  Diarrhea, no abdominal pain, C. difficile ordered on admission        Diet ADULT DIET; Regular   DVT Prophylaxis [] Lovenox, []  Heparin, [] SCDs, [] Ambulation,  [] Eliquis, [] Xarelto  [] Coumadin   Code Status Full Code             Personally reviewed Lab Studies and Imaging     Discussed management of the case with infectious disease who recommended 
  Arrived to place PICC line with bedside RN. Pre-procedure and timeout done with RN, discussed limitations of placement and allergies.      Pt's basilic, brachial, cephalic are all easily collapsible with no indication for a clot. Vein selected is large enough for catheter. Pt tolerated sterile procedure well, with no difficulty accessing basilic vein, when accessed - blood was free flowing and non-pulsatile. Guidewire, introducer, and catheter went in smoothly.   PICC line verified with 3CG technology with peaked P-waves (please see image below). PICC tip terminates in the SVC according to [employer] 3CG tip confirmation system. PICC was seen dropping into SVC with tip tracking technology and discernable peaked p waves were noted without negative deflection.   OK to use PICC.   Please use new IV tubing when connecting to the newly placed central line.      Post procedure - reorganized pt table, placed pt in lowest position, with call light and educated on line care. Reported off to bedside RN.      Please call if you have any questions about the PICC or ML. The  will direct you to the PICC RN that is on call.      [phone]                
  [facility] - Occupational Therapy   Phone: [phone]    Occupational Therapy  Facility/Department:[facility] 5 PROGRESSIVE CARE    [x] Initial Evaluation            [] Daily Treatment Note         [x] Discharge Summary      Patient: Reilly Alexander   : 2000   MRN: [id number]   Date of Service:  2025    Staff Mobility Recommendation: SBA at RW    AM-PAC Score: 24  Discharge Recommendations: home with assist PRN    Admitting Diagnosis:  DKA, type 1, not at goal (HCC)  Ordering Physician: Aly Bourne MD   Current Admission Summary: Pt is a 23 yo M admitted with DKA, type 1, foot infection. Presented to ED with R foot pain/swelling. S/p Incision and drainage right foot osteomyelitis bone cortex right foot 5th metatarsal with excisional debridement of deep infection multiple areas per Dr. Bourne 25. NWB RLE.     Past Medical History:  has no past medical history on file.  Past Surgical History:  has a past surgical history that includes Foot Debridement (Right, 2025).    Assessment  Activity Tolerance: Excellent  Impairments Requiring Therapeutic Intervention: none - eval with same day discharge  Prognosis: excellent    Clinical Assessment: Pt is a 23 yo M admitted with DKA, type 1, foot infection. Presented to ED with R foot pain/swelling. S/p Incision and drainage right foot osteomyelitis bone cortex right foot 5th metatarsal with excisional debridement of deep infection multiple areas per Dr. Bourne 25. NWB RLE. PTA - pt lives at home with family, 3 LAWANDA, typically fully IND without device. Today - pt completed bed mobility IND, fxl txs mod I, amb with RW household distances mod I, maintained NWB RLE throughout without difficulty. Inc time for education. Pt at/near baseline function and appears to demo good insight into deficits/safety awareness. No further acute OT needs identified at this time. Anticipate d/c to home with family assist PRN.        DME Required For Discharge: 
1pc of gauze packed in each incisions x2, total of 2pcs packed in RIGHT foot  
4 Eyes Skin Assessment     NAME:  Reilly Alexander  YOB: 2000  MEDICAL RECORD NUMBER:  [id number]    The patient is being assessed for  Admission    I agree that at least one RN has performed a thorough Head to Toe Skin Assessment on the patient. ALL assessment sites listed below have been assessed.      Areas assessed by both nurses:    Head, Face, Ears, Shoulders, Back, Chest, Arms, Elbows, Hands, Sacrum. Buttock, Coccyx, Ischium, Legs. Feet and Heels, and Under Medical Devices         Does the Patient have a Wound? Yes wound(s) were present on assessment. LDA wound assessment was Initiated and completed by RN       Tone Prevention initiated by RN: Yes  Wound Care Orders initiated by RN: Yes    Pressure Injury (Stage 3,4, Unstageable, DTI, NWPT, and Complex wounds) if present, place Wound referral order by RN under : Yes    New Ostomies, if present place, Ostomy referral order under : No     Nurse 1 eSignature: Electronically signed by Dillan Paulino RN on 4/13/25 at 4:20 PM EDT    **SHARE this note so that the co-signing nurse can place an eSignature**    Nurse 2 eSignature: Electronically signed by Sindhu Carl RN on 4/13/25 at 5:04 PM EDT    
[facility]  Hyperglycemia Event      NAME: Reilly Alexander  MEDICAL RECORD NUMBER:  [id number]  AGE: 24 y.o.   GENDER: male  : 2000  EPISODE DATE:  2025     Data     Recent Labs     25  1747 25  1925 25  0825 25  0931 25  1111 25  1215   POCGLU 304* 236* 268* 249* 198* 223*       HgBA1c:    Lab Results   Component Value Date/Time    LABA1C 11.6 2025 04:28 PM       BUN/Creatinine:    Lab Results   Component Value Date/Time    BUN 7 2025 04:18 AM    CREATININE 0.3 2025 04:18 AM     GFR Estimated Creatinine Clearance: 434 mL/min (A) (based on SCr of 0.3 mg/dL (L)).    Medications  Scheduled Medications:   sodium chloride flush  5-40 mL IntraVENous 2 times per day    insulin lispro  10 Units SubCUTAneous TID WC    insulin glargine  25 Units SubCUTAneous Daily    insulin lispro  0-8 Units SubCUTAneous 4x Daily WC    ampicillin-sulbactam  3,000 mg IntraVENous Q6H    enoxaparin  40 mg SubCUTAneous QHS       Diet  Current diet/supplement order: ADULT DIET; Regular     Recorded PO: PO Meals Eaten (%): 76 - 100% last meal in flowsheets      Action      Recommend BG targets 140 - 180. (Dexamethasone 10 mg in OR today.)     Education session with Reilly and his mother.    Reviewed basal and prandial insulin concepts.         Electronically signed by Lyndsey Burns RN on 2025 at 4:18 PM  
CLINICAL PHARMACY NOTE: MEDS TO BEDS    Total # of Prescriptions Filled: 3   The following medications were delivered to the patient:  Current Discharge Medication List        START taking these medications    Details   insulin lispro, 1 Unit Dial, (HUMALOG KWIKPEN) 100 UNIT/ML SOPN Inject 12 Units into the skin 3 times daily (before meals)  Qty: 8 Adjustable Dose Pre-filled Pen Syringe, Refills: 0      !! Insulin Pen Needle 32G X 4 MM MISC 1 each by Does not apply route daily  Qty: 100 each, Refills: 1       !! - Potential duplicate medications found. Please discuss with provider.        Basaglar 100 UNIT/ML injection pen      Additional Documentation:    
Call from podiatry, Dr. Kenisha Billy's office, stating that Dr. Billy is declining consult due to insurance at this time.   
Comprehensive Nutrition Assessment    Type and Reason for Visit:  Initial, LOS    Nutrition Recommendations/Plan:   Offer Shai wound healing supplement with lunch and dinner, fruit punch or orange, no preference per patient  Add carb control back to diet order but increase from 4 to 5 carb servings per meal  RD suggested to patient Ensure high protein if po intake drops below 50% meals     Malnutrition Assessment:  Malnutrition Status:       Context:  Acute Illness     Findings of the 6 clinical characteristics of malnutrition:  Energy Intake:  Mild decrease in energy intake  Weight Loss:  Unable to assess (weight has declined over the past 6 years, used to weigh over 500 lbs)     Body Fat Loss:  No body fat loss     Muscle Mass Loss:  No muscle mass loss    Fluid Accumulation:  Unable to assess     Strength:  Not Performed    Nutrition Assessment:    LOS assessment.  Patient admitted with DKA.  Status post I&D right foot on 4/16, 4/18 and 4/21.  Currently on a regular diet (modified from carb control on 4/21).   Po intake % meals.    Nutrition Related Findings:    BG greater than 200 mg/dl; labs reviewed-no new labs 4/22, electrolyes normal on 4/21 Wound Type: Diabetic Ulcer       Current Nutrition Intake & Therapies:    Average Meal Intake: %  Average Supplements Intake: Unable to assess  ADULT DIET; Regular  ADULT ORAL NUTRITION SUPPLEMENT; Lunch, Dinner; Wound Healing Oral Supplement    Anthropometric Measures:  Height: 188 cm (6' 2\")  Ideal Body Weight (IBW): 190 lbs (86 kg)       Current Body Weight: 89.1 kg (196 lb 6.9 oz),   IBW. Weight Source: Standing scale  Current BMI (kg/m2): 25.2                             BMI Categories: Overweight (BMI 25.0-29.9)    Estimated Daily Nutrient Needs:  Energy Requirements Based On: Kcal/kg  Weight Used for Energy Requirements: Admission  Energy (kcal/day): 6750-6495 (28-30 kcal/76.7 kg)  Weight Used for Protein Requirements: Ideal  Protein (g/day): 
Discharge order noted. SW and CM completed home needs and set up infusions in the outpatient infusion center. Meds to beds done and pt verbalized his ride does not get off of work until 1600; this RN stated that was okay and placed meds in back omnicell fride until dc. IV removed and PICC line remains in place at discharge. Charge RN aware of discharge being delayed. Electronically signed by Aditya Mann RN on 4/23/2025 at 12:43 PM   
Discharge orders acknowledged by RN . Discharge teaching completed with pt and family. AVS reviewed and all questions answered. Medication regimen reviewed and pt understands schedule. Follow up appointments also reviewed with pt and resources given for discharge. Pt was sent electronic to be filled and understands schedule. IV removed. PICC line to stay in place for outpatient abx. 60+ minutes of education completed. Required core measures completed. Pt vitals WDL. Pt waiting for transport home. Electronically signed by Aditya Mann RN on 4/23/2025 at 4:10 PM        Pt transported home with belongings including: meds, PICC line arm cover, and wound dressing change supplies. Pt taken to main lobby by wheelchair. No complications. Electronically signed by Aditya Mann RN on 4/23/25 at 6:00 PM EDT   
Discharge rounds  Asked for assistance with OP Invanz infusions.    Went to Infusion center. They have the order ( daily until 6/4/25) Per Dr Hill. Does not need outpatient f/u per his note.  Bedside conversation with Reilly   He has transportation to get there.  He's spoken with financial counseling -> thinks that Medicaid gabriella is in process.  I ask CM to put in a referral for Dr Arellano to [facility] is obtained.Reilly's goal would be to get in with Dr Gore once he has a payer source.     Feels comfortable changing his foot dressing. Ortho was in and showed him. Has supplies to take home. Mom is in healthcare and can assist.   He has a Wellness Appt on 5/1/25 for DM management.   Info for [facility] pharmacy is at bedside.  [facility] will fill [facility] meds.    Bonny Hamilton [facility]   
Dr. Wes Mistry returned consult call, MD stating that he does not see pt's without insurance. MD recommends consulting vascular surgery or wound care.   Dr. James Irizarry notified. Awaiting response.  
[facility]  Hyperglycemia Event      NAME: Reilly Alexander  MEDICAL RECORD NUMBER:  [id number]  AGE: 24 y.o.   GENDER: male  : 2000  EPISODE DATE:  2025     Data     Recent Labs     25  0328 25  0408 25  0511 25  0619 25  0721 25  0812   POCGLU 162* 170* 148* 166* 167* 222*       HgBA1c:    Lab Results   Component Value Date/Time    LABA1C 11.6 2025 04:28 PM       BUN/Creatinine:    Lab Results   Component Value Date/Time    BUN 6 2025 04:53 AM    CREATININE 0.5 2025 04:53 AM     GFR Estimated Creatinine Clearance: 243 mL/min (A) (based on SCr of 0.5 mg/dL (L)).    Medications  Scheduled Medications:   insulin lispro  0-4 Units SubCUTAneous 4x Daily AC & HS    ampicillin-sulbactam  3,000 mg IntraVENous Q6H    enoxaparin  40 mg SubCUTAneous QHS    vancomycin  1,250 mg IntraVENous Q8H    vancomycin (VANCOCIN) intermittent dosing (placeholder)   Other RX Placeholder       Diet  Current diet/supplement order: ADULT DIET; Regular; 5 carb choices (75 gm/meal)     Recorded PO:   last meal in flowsheets      Action      Recommend adding prandial Humalog.      Physician Notified of event: Yes   James Irizarry MD      Electronically signed by Lyndsey Burns RN on 2025 at 8:38 AM  
[facility]  Hyperglycemia Event      NAME: Reilly Alexander  MEDICAL RECORD NUMBER:  [id number]  AGE: 24 y.o.   GENDER: male  : 2000  EPISODE DATE:  4/15/2025     Data     Recent Labs     25  0721 25  0812 25  1203 25  1626 25  2102 04/15/25  0746   POCGLU 167* 222* 251* 226* 263* 214*       HgBA1c:    Lab Results   Component Value Date/Time    LABA1C 11.6 2025 04:28 PM       BUN/Creatinine:    Lab Results   Component Value Date/Time    BUN 5 04/15/2025 04:14 AM    CREATININE 0.4 04/15/2025 04:14 AM     GFR Estimated Creatinine Clearance: 319 mL/min (A) (based on SCr of 0.4 mg/dL (L)).    Medications  Scheduled Medications:   insulin glargine  20 Units SubCUTAneous Daily    insulin lispro  5 Units SubCUTAneous TID WC    insulin lispro  0-4 Units SubCUTAneous 4x Daily AC & HS    DAPTOmycin (CUBICIN) 450 mg in sodium chloride 0.9 % 50 mL IVPB  6 mg/kg IntraVENous Q24H    ampicillin-sulbactam  3,000 mg IntraVENous Q6H    enoxaparin  40 mg SubCUTAneous QHS       Diet  Current diet/supplement order: ADULT DIET; Regular; 5 carb choices (75 gm/meal)     Recorded PO: PO Meals Eaten (%): 76 - 100% last meal in flowsheets      Action      Recommend BG targets 140 - 180.    Agree with plan to add prandial Humalog.      Reviewed basal and prandial insulin concepts.    Reviewed hypoglycemia signs and symptoms.  Agrees to notify staff of any symptoms.      Dexcom G7 reader sample provided.  Reports having G7 sensors at home.  Recommend delaying starting a new sensor until all MRI/CT scans are completed.       Considering consenting to MRI.  Notified nursing.      Will schedule [facility] when closer to discharge.       Electronically signed by Lyndsey Burns RN on 4/15/2025 at 12:13 PM  
[facility] Orthopedic Surgery  Progress Note        POD # 1&3, s/p I&D right foot osteomyelitis with deep abscess and necrotic tissue.    Pt comfortable, no c/o.  Drsg right foot D/C/I,  Minimal pain with right toes ROM.    CBC:   Lab Results   Component Value Date/Time    WBC 6.9 04/15/2025 04:14 AM    RBC 3.68 04/15/2025 04:14 AM    HGB 11.0 04/19/2025 05:41 AM    HCT 32.2 04/19/2025 05:41 AM    MCV 85.1 04/15/2025 04:14 AM    MCH 30.4 04/15/2025 04:14 AM    MCHC 35.7 04/15/2025 04:14 AM    RDW 12.5 04/15/2025 04:14 AM     04/15/2025 04:14 AM    MPV 7.4 04/15/2025 04:14 AM     PT/INR:    Lab Results   Component Value Date/Time    PROTIME 14.1 04/13/2025 12:55 PM    INR 1.07 04/13/2025 12:55 PM     PTT:    Lab Results   Component Value Date/Time    APTT 31.5 04/13/2025 12:55 PM   [APTT    A/P: s/p I&D right foot osteomyelitis with deep abscess and necrotic tissue.  - Stable  - PT/OT, WBAT  - He will have another staged procedure on Monday for I&D right foot.      Aly Bourne MD 4/19/2025 10:03 AM    
Hospitalist Progress Note  4/20/2025 2:07 PM  Subjective:   Admit Date: 4/13/2025 PCP: No primary care provider on file. Status: Inpatient   Interval History: Hospital Day: 8, admitted with DKA (pH 7.10) type 1 with insulin infusion transitioned to basal / bolus insulin.  HgbA1c 11.6%.  Unable to afford insulin and was not taking.  He developed a blister on the bottom of the right foot with subsequent acute osteomyelitis involving the 5th metatarsal head/neck and adjacent proximal phalangeal base on MRI with known diabetic foot infection.  Excisional debridement (4/16 and 4/18) with possible surgery this week.     Diet: controlled carbohydrate (60 gm/meal), NPO after midnight  Right forearm peripheral IV (4/13, day #8)    Medications:     Sodium chloride at 75 ml/hr  insulin glargine  30 Units SubCUTAneous Daily   insulin lispro  12 + 0-8 Units SubCUTAneous 4x Daily WC   ampicillin-sulbactam  3,000 mg IntraVENous Q6H   enoxaparin  40 mg SubCUTAneous QHS       Labs:       04/18/25  0418 04/19/25  0541 04/20/25  0525   WBC  --   --  4.6   HGB  --  11.0* 11.1*   PLT  --   --  316   MCV  --   --  86.2           --  138   K 3.5  --  3.9     --  100   CO2 29  --  30   BUN 7  --  12   CREATININE 0.3*  --  0.4*   GLUCOSE 282*  --  320*         MG 1.79*  --   --    PHOS 3.7  --   --    CALCIUM 8.7  --  8.4   ALBUMIN  --   --  2.8*   AST  --   --  29   ALT  --   --  28   ALKPHOS  --   --  118     CK (4/16) 16 U/L  Beta-hydroxybutyrate (4/13) 4.59, (4/14) 1.03 mmol/L  Lactate (4/13) 1.1 / 1.1 mmol/L    Surgical culture (4/16) Strep agalactiae (Beta Strep Group B) heavy growth.     Anaerobic culture (4/14) Streptococcus dysgalactiae (light growth), Strep agalactiae (Beta Strep Group B, light growth), and Finegoldia magna.     Blood culture x 2 (4/13) no growth    POC Glucose:   Recent Labs     04/19/25  1731 04/19/25  2057 04/20/25  0820 04/20/25  1218   POCGLU 204* 148* 291* 107*     MRI right foot (4/15)  Acute 
ID Follow-up NOTE    CC:   R DM foot infection / osteomyelitis  Antibiotics: Ertapenem    Admit Date: 2025  Hospital Day: 11    Subjective:     POD#1  I&D R foot, 'down to fascia'    Patient reports no R foot pain at present  No complaint      Objective:     Patient Vitals for the past 8 hrs:   Resp SpO2   25 1110 15 --   25 1040 18 --   25 0929 -- 97 %     I/O last 3 completed shifts:  In: 4383.3 [P.O.:1140; I.V.:2577.6; IV Piggyback:665.7]  Out: -   I/O this shift:  In: 420 [P.O.:420]  Out: -     EXAM:  GENERAL: No apparent distress.  RA  HEENT: Membranes moist, no oral lesion  NECK:  Supple, no lymphadenopathy  LUNGS: Clear b/l, no rales, no dullness  CARDIAC: RRR, no murmur appreciated  ABD:  + BS, soft / NT  EXT:  No rash, no edema, no lesions  R foot with dressing   NEURO: No focal neurologic findings  PSYCH: Orientation, sensorium, mood normal  LINES:  R PICC, placed        Data Review:  Lab Results   Component Value Date    WBC 4.6 2025    HGB 11.3 (L) 2025    HCT 32.5 (L) 2025    MCV 86.2 2025     2025     Lab Results   Component Value Date    CREATININE 0.4 (L) 2025    BUN 10 2025     2025    K 4.3 2025     2025    CO2 30 2025       Hepatic Function Panel:   Lab Results   Component Value Date/Time    ALKPHOS 118 2025 05:25 AM    ALT 28 2025 05:25 AM    AST 29 2025 05:25 AM    BILITOT <0.2 2025 05:25 AM       MICRO:   BC no growth    Wound cult - light GBS, S dysgactiae    IMAGIN/13 --  XR FOOT RIGHT (MIN 3 VIEWS)   Final Result   Soft tissue swelling of the foot. No evidence of osteomyelitis.      -  MRI FOOT RIGHT W WO CONTRAST   Final Result   1.  Acute osteomyelitis involving the 5th metatarsal head/neck and adjacent   proximal phalangeal base, with concern for septic arthropathy of the 5th MTP   joint.  Surrounding soft tissue abscess within the 
ID Follow-up NOTE    CC:   R DM foot infection / osteomyelitis  Antibiotics: Unasyn    Admit Date: 2025  Hospital Day: 10    Subjective:     OR today I&D R foot, 'down to fascia'    Patient reports no R foot pain at present  No complaint      Objective:     Patient Vitals for the past 8 hrs:   BP Temp Temp src Pulse Resp SpO2   25 1552 135/88 97.9 °F (36.6 °C) Oral (!) 104 16 98 %     I/O last 3 completed shifts:  In: 2142.7 [P.O.:300; I.V.:1467.1; IV Piggyback:375.6]  Out: -   I/O this shift:  In: 886.4 [P.O.:360; I.V.:435.5; IV Piggyback:90.9]  Out: -     EXAM:  GENERAL: No apparent distress.  RA  HEENT: Membranes moist, no oral lesion  NECK:  Supple, no lymphadenopathy  LUNGS: Clear b/l, no rales, no dullness  CARDIAC: RRR, no murmur appreciated  ABD:  + BS, soft / NT  EXT:  No rash, no edema, no lesions  R foot with dressing   NEURO: No focal neurologic findings  PSYCH: Orientation, sensorium, mood normal  LINES:  Peripheral iv       Data Review:  Lab Results   Component Value Date    WBC 4.6 2025    HGB 11.3 (L) 2025    HCT 32.5 (L) 2025    MCV 86.2 2025     2025     Lab Results   Component Value Date    CREATININE 0.4 (L) 2025    BUN 10 2025     2025    K 4.3 2025     2025    CO2 30 2025       Hepatic Function Panel:   Lab Results   Component Value Date/Time    ALKPHOS 118 2025 05:25 AM    ALT 28 2025 05:25 AM    AST 29 2025 05:25 AM    BILITOT <0.2 2025 05:25 AM       MICRO:   BC no growth    Wound cult - light GBS, S dysgactiae    IMAGIN/13 --  XR FOOT RIGHT (MIN 3 VIEWS)   Final Result   Soft tissue swelling of the foot. No evidence of osteomyelitis.      -  MRI FOOT RIGHT W WO CONTRAST   Final Result   1.  Acute osteomyelitis involving the 5th metatarsal head/neck and adjacent   proximal phalangeal base, with concern for septic arthropathy of the 5th MTP   joint.  
[facility]  Glycemic Outcome      NAME: Reilly Alexander  MEDICAL RECORD NUMBER:  [id number]  AGE: 24 y.o.   GENDER: male  : 2000  EPISODE DATE:  2025     Data     Recent Labs     25  2040 25  0901 25  1233 25  1750 25  2111 25  0904   POCGLU 181* 208* 194* 239* 154* 263*     HgBA1c:    Lab Results   Component Value Date/Time    LABA1C 11.6 2025 04:28 PM     BUN/Creatinine:    Lab Results   Component Value Date/Time    BUN 10 2025 04:30 AM    CREATININE 0.4 2025 04:30 AM      Latest Reference Range & Units Most Recent   Est, Glom Filt Rate >60  >90  25 04:30     FIB-4 Calculation: 0.45 at 2025  5:25 AM  Calculated from:  SGOT/AST: 29 U/L at 2025  5:25 AM  SGPT/ALT: 28 U/L at 2025  5:25 AM  Platelets: 316 K/uL at 2025  5:25 AM  Age: 24 years    Medications  Scheduled Medications:   sodium chloride flush  5-40 mL IntraVENous 2 times per day    sodium chloride flush  5-40 mL IntraVENous 2 times per day    aspirin  325 mg Oral BID    insulin glargine  30 Units SubCUTAneous Daily    insulin lispro  12 Units SubCUTAneous TID WC    sodium chloride flush  5-40 mL IntraVENous 2 times per day    insulin lispro  0-8 Units SubCUTAneous 4x Daily WC    ampicillin-sulbactam  3,000 mg IntraVENous Q6H       Diet  Current diet/supplement order: ADULT ORAL NUTRITION SUPPLEMENT; Lunch, Dinner; Wound Healing Oral Supplement  ADULT DIET; Regular; 5 carb choices (75 gm/meal)     Recorded PO: PO Meals Eaten (%): 76 - 100% last meal in flowsheets      Action      Met with pt and discussed his glucoses. Pt voiced he agrees he may need eith spilt dosing or a higher prandial dose with breakfast. Discussed how a night basal ose may help prevent elevated glucoses in the morning/fasting. Pt agreeable to be provided information about [facility] and for a referral to be sent while he is pending medicaid. Pt has D/C order present.     Secure 
[facility]  Diabetes Education   Progress Note       NAME:  Reilly Alexander  MEDICAL RECORD NUMBER:  [id number]  AGE: 24 y.o.   GENDER: male  : 2000  TODAY'S DATE:  2025    Subjective   Reason for Diabetic Education Evaluation and Assessment: LASHA Grover reports being diagnosed with diabetes in 2019.      He has been without health insurance and insulin.  Presented to the ED over concerns of a foot wound.      Reilly reports that he is unable to maintain his target weight of 200 lb.  He states he weighed 500 lb in high school.      Visit Type: evaluation      Reilly Alexander is a 24 y.o. male referred by:     [x] Physician  [] Nursing  [] Chart Review   [] Other:     PAST MEDICAL HISTORY    No past medical history on file.    PAST SURGICAL HISTORY    No past surgical history on file.    FAMILY HISTORY    Family History   Problem Relation Age of Onset    Hypertension Mother     Diabetes Mother     Cancer Father         Thyroid    Diabetes Father     No Known Problems Brother     No Known Problems Brother        SOCIAL HISTORY    Social History     Tobacco Use    Smoking status: Former     Current packs/day: 0.00     Types: Cigarettes     Quit date: 2015     Years since quittin.7    Smokeless tobacco: Never   Vaping Use    Vaping status: Never Used   Substance Use Topics    Alcohol use: Never    Drug use: Never       ALLERGIES    Allergies   Allergen Reactions    Keflex [Cephalexin]     Sulfamethoxazole-Trimethoprim Hives     rash       MEDICATIONS     insulin lispro  0-4 Units SubCUTAneous 4x Daily AC & HS    sodium chloride  500 mL IntraVENous Once    ampicillin-sulbactam  3,000 mg IntraVENous Q6H    enoxaparin  40 mg SubCUTAneous QHS    vancomycin  1,250 mg IntraVENous Q8H    vancomycin (VANCOCIN) intermittent dosing (placeholder)   Other RX Placeholder       Objective        Patient Active Problem List   Diagnosis Code    DKA, type 1, not at goal (HCC) E10.10        /76   
[facility] Orthopedic Surgery   Progress Note      S/P :  SUBJECTIVE  lying in bed asleep. Aroused to name. Pain is   described in right foot and with the intensity of mild. Pain is described as aching.       OBJECTIVE              Physical                      VITALS:  /86   Pulse 93   Temp 97.7 °F (36.5 °C) (Oral)   Resp 18   Ht 1.88 m (6' 2\")   Wt 85 kg (187 lb 6.3 oz)   SpO2 100%   BMI 24.06 kg/m²                     MUSCULOSKELETAL:  right foot NVI. Wiggles toes to command. Able to plantarflex and dorsiflex ankle Pedal pulses are palpable.                    NEUROLOGIC:                                  Sensory:  Touch:  Right Lower Extremity:  normal                                                 Surgical wound appears clean and dry right foot with ACE.    Data       CBC:   Lab Results   Component Value Date/Time    WBC 6.9 04/15/2025 04:14 AM    RBC 3.68 04/15/2025 04:14 AM    HGB 11.7 04/17/2025 04:49 AM    HCT 33.5 04/17/2025 04:49 AM    MCV 85.1 04/15/2025 04:14 AM    MCH 30.4 04/15/2025 04:14 AM    MCHC 35.7 04/15/2025 04:14 AM    RDW 12.5 04/15/2025 04:14 AM     04/15/2025 04:14 AM    MPV 7.4 04/15/2025 04:14 AM        WBC:    Lab Results   Component Value Date/Time    WBC 6.9 04/15/2025 04:14 AM        Hemoglobin/Hematocrit:    Lab Results   Component Value Date/Time    HGB 11.7 04/17/2025 04:49 AM    HCT 33.5 04/17/2025 04:49 AM        PT/INR:    Lab Results   Component Value Date/Time    PROTIME 14.1 04/13/2025 12:55 PM    INR 1.07 04/13/2025 12:55 PM              Current Inpatient Medications             Current Facility-Administered Medications: insulin lispro (HUMALOG,ADMELOG) injection vial 10 Units, 10 Units, SubCUTAneous, TID WC  [START ON 4/18/2025] insulin glargine (LANTUS) injection vial 25 Units, 25 Units, SubCUTAneous, Daily  0.45 % sodium chloride infusion, , IntraVENous, Continuous  sodium chloride flush 0.9 % injection 5-40 mL, 5-40 mL, IntraVENous, 2 times per day  sodium 
[facility] Orthopedic Surgery   Progress Note      S/P :  SUBJECTIVE  in bed. Alert and oriented. . Pain is   described in right foot and with the intensity of mild. Pain is described as aching. States recently took a Percocet.       OBJECTIVE              Physical                      VITALS:  BP (!) 131/93   Pulse 93   Temp 98.1 °F (36.7 °C) (Oral)   Resp 15   Ht 1.88 m (6' 2\")   Wt 90.9 kg (200 lb 6.4 oz)   SpO2 97%   BMI 25.73 kg/m²                     MUSCULOSKELETAL:  right foot with mild erythema, moderate swelling lateral aspect only. Wiggles toes to command. Pedal pulse palpable                   NEUROLOGIC:                                  Sensory:  Touch:  Right Lower Extremity:  normal                                                 Surgical wound appears with serous drainage. No notable eschar or exudate from left foot wound. Redressed with Vashe soaked gauze then ABD then Kerlix then ACE. Tolerated well.     Data       CBC:   Lab Results   Component Value Date/Time    WBC 4.6 04/20/2025 05:25 AM    RBC 3.67 04/20/2025 05:25 AM    HGB 11.3 04/22/2025 04:52 AM    HCT 32.5 04/22/2025 04:52 AM    MCV 86.2 04/20/2025 05:25 AM    MCH 30.1 04/20/2025 05:25 AM    MCHC 34.9 04/20/2025 05:25 AM    RDW 12.2 04/20/2025 05:25 AM     04/20/2025 05:25 AM    MPV 6.8 04/20/2025 05:25 AM        WBC:    Lab Results   Component Value Date/Time    WBC 4.6 04/20/2025 05:25 AM        Hemoglobin/Hematocrit:    Lab Results   Component Value Date/Time    HGB 11.3 04/22/2025 04:52 AM    HCT 32.5 04/22/2025 04:52 AM        PT/INR:    Lab Results   Component Value Date/Time    PROTIME 14.1 04/13/2025 12:55 PM    INR 1.07 04/13/2025 12:55 PM              Current Inpatient Medications             Current Facility-Administered Medications: ertapenem (INVanz) 1,000 mg in sodium chloride 0.9 % 50 mL IVPB (addEASE), 1,000 mg, IntraVENous, Q24H  sodium chloride flush 0.9 % injection 5-40 mL, 5-40 mL, IntraVENous, 2 times per 
NAME:  Reilly Alexander  YOB: 2000  MEDICAL RECORD NUMBER:  [id number]    Shift Summary: Arrived to ICU at 1530 this afternoon. DKA protocol and Sepsis protocol. Wound noted to right food. Podiatry and Infectious Disease consults placed. Pictures taken and site cleansed and dressing placed.     Family updated: Yes:  mother and father    Rhythm: Sinus Tachycardia     Most recent vitals:   Visit Vitals  /72   Pulse (!) 113   Temp 97.8 °F (36.6 °C) (Oral)   Resp 18   Ht 1.88 m (6' 2\")   Wt 76.4 kg (168 lb 6.9 oz)   SpO2 100%   BMI 21.63 kg/m²           No data found.    No data found.      Respiratory support needed (if any):  - RA    Admission weight Weight - Scale: 76.7 kg (169 lb 1.5 oz) (04/13/25 1230)    Today's weight    Wt Readings from Last 1 Encounters:   04/13/25 76.4 kg (168 lb 6.9 oz)        Watson need assessed each shift: N/A - no watson present  UOP >30ml/hr: YES  Last documented BM (in last 48 hrs):  No data found.             Restraints (in use currently or dc'd in last 12 hrs): No    Order current and documentation up to date? No    Lines/Drains reviewed @ bedside.  Peripheral IV 04/13/25 Distal;Right Forearm (Active)   Number of days: 0       Peripheral IV 04/13/25 Right;Anterior Forearm (Active)   Number of days: 0         Drip rates at handoff:    sodium chloride Stopped (04/13/25 5169)    dextrose 5 % and 0.45 % NaCl 150 mL/hr at 04/13/25 1900    insulin 5.3 Units/hr (04/13/25 1900)       Lab Data:   CBC:   Recent Labs     04/13/25  1255   WBC 16.4*   HGB 14.4   HCT 42.9   MCV 90.0        BMP:    Recent Labs     04/13/25  1255 04/13/25  1628   * 130*   K 4.6 3.8   CO2 7* 8*   BUN 8 8   CREATININE 0.8* 0.7*     ABG:   Recent Labs     04/13/25  1620   PHART 7.177*   BUC8RFA 16.1*   PO2ART 119.0*       Any consults during the shift? Yes: Consults received: : Podiatry, Infectious Disease and Social Work    Any signed and held orders to be released?  No        4 [facility] Skin 
NAME:  Reilly Alexander  YOB: 2000  MEDICAL RECORD NUMBER:  [id number]    Shift Summary: Pt transitioned off of insulin gtt. IV electrolyte replacement and antibiotics given. MRI on hold d/t lapse in insurance.     Family updated: Yes:  at bedside    Rhythm: Sinus Tachycardia     Most recent vitals:   Visit Vitals  /87   Pulse (!) 106   Temp 98.3 °F (36.8 °C) (Oral)   Resp 18   Ht 1.88 m (6' 2\")   Wt 76.8 kg (169 lb 5 oz)   SpO2 100%   BMI 21.74 kg/m²           No data found.    No data found.      Respiratory support needed (if any):  - RA    Admission weight Weight - Scale: 76.7 kg (169 lb 1.5 oz) (04/13/25 1230)    Today's weight    Wt Readings from Last 1 Encounters:   04/14/25 76.8 kg (169 lb 5 oz)        Watson need assessed each shift: N/A - no watson present  UOP >30ml/hr: YES  Last documented BM (in last 48 hrs):  No data found.             Restraints (in use currently or dc'd in last 12 hrs): No      Lines/Drains reviewed @ bedside.  Peripheral IV 04/13/25 Distal;Right Forearm (Active)   Number of days: 1       Peripheral IV 04/13/25 Right;Anterior Forearm (Active)   Number of days: 1         Drip rates at handoff:    dextrose      sodium chloride Stopped (04/13/25 1739)    dextrose 5 % and 0.45 % NaCl Stopped (04/14/25 0808)    insulin Stopped (04/14/25 0808)       Lab Data:   CBC:   Recent Labs     04/13/25  1255 04/14/25  0453   WBC 16.4* 10.5   HGB 14.4 11.3*   HCT 42.9 31.4*   MCV 90.0 86.6    293     BMP:    Recent Labs     04/14/25  1147 04/14/25  1426   * 134*   K 4.0 3.8   CO2 17* 18*   BUN 5* 4*   CREATININE 0.4* 0.5*     ABG:   Recent Labs     04/13/25  1620   PHART 7.177*   HYB2ZTI 16.1*   PO2ART 119.0*       Any consults during the shift? No    Any signed and held orders to be released?  No        4 Eyes Skin Assessment       The patient is being assessed for  Shift Handoff    I agree that at least one RN has performed a thorough Head to Toe Skin Assessment on the 
NAME:  Reilly Alexander  YOB: 2000  MEDICAL RECORD NUMBER:  [id number]    Shift Summary: Pt transferred to 5114 on 5N. Report called to CHANDA Zamudio. Bedside handoff completed with CHANDA Zamudio.    Family updated: No    Rhythm: Sinus Tachycardia     Most recent vitals:   Visit Vitals  /84   Pulse (!) 105   Temp 98.3 °F (36.8 °C) (Oral)   Resp 14   Ht 1.88 m (6' 2\")   Wt 80.6 kg (177 lb 11.1 oz)   SpO2 96%   BMI 22.81 kg/m²           No data found.    No data found.      Respiratory support needed (if any):  - RA    Admission weight Weight - Scale: 76.7 kg (169 lb 1.5 oz) (04/13/25 1230)    Today's weight    Wt Readings from Last 1 Encounters:   04/15/25 80.6 kg (177 lb 11.1 oz)        Watson need assessed each shift: N/A - no watson present  UOP >30ml/hr: YES  Last documented BM (in last 48 hrs):  No data found.             Restraints (in use currently or dc'd in last 12 hrs): No      Lines/Drains reviewed @ bedside.  Peripheral IV 04/13/25 Distal;Right Forearm (Active)   Number of days: 2       Peripheral IV 04/13/25 Right;Anterior Forearm (Active)   Number of days: 2         Drip rates at handoff:    dextrose      sodium chloride Stopped (04/13/25 1739)    dextrose 5 % and 0.45 % NaCl Stopped (04/14/25 0808)    insulin Stopped (04/14/25 0808)       Lab Data:   CBC:   Recent Labs     04/14/25  0453 04/15/25  0414   WBC 10.5 6.9   HGB 11.3* 11.2*   HCT 31.4* 31.3*   MCV 86.6 85.1    268     BMP:    Recent Labs     04/14/25  2150 04/15/25  0414   * 133*   K 3.5 3.3*   CO2 19* 20*   BUN 5* 5*   CREATININE 0.5* 0.4*     ABG:   Recent Labs     04/13/25  1620   PHART 7.177*   AJA5GUC 16.1*   PO2ART 119.0*       Any consults during the shift? Yes: Consults received: : ortho, podiatry, and vascular    Any signed and held orders to be released?  No        4 Eyes Skin Assessment       The patient is being assessed for  Transfer to New Unit    I agree that at least one RN has performed a thorough 
Notified Dr. Irizarry of pts BS this AM. New orders placed  
Notified Dr. Irizarry of pts Dinner BS. Will continue to monitor.  
Occupational Therapy  Reilly Benjamin  [id number]  [id number]    New OT orders received and chart reviewed. Pt s/p I&D R foot with Dr. Bourne yesterday, 4-17-25, now WBAT. Pt denies need for re-evaluation. Per PT, pt has been up IND in the room, no issues with fxl tasks. No acute OT needs identified at this time. Will sign off    Electronically signed by Jocelyne Adler OT on 4/18/25 at 1:16 PM EDT    
Occupational Therapy  Reilly Benjamin  [id number]  [id number]    New OT orders received and chart reviewed. Pt s/p I&D on 4/16 and 4/18 per Dr. Bourne & Stage 3 debridement 4/21/25. NWB R foot. Therapist to pt's room, pt seated EOB with family at bedside, reporting he has been UAL in the room without difficulty. Pt re-ed on NWB R foot, pt declines concerns regarding ability to maintain WB status and also reiterated available DME at home, no needs. No acute OT needs identified. Will sign off.     Electronically signed by Jocelyne Adler OT on 4/22/25 at 10:25 AM EDT    
Patient alert and oriented x 4. Patient voiding via BRP. Patient tolerating diet with no c/o nausea or pain at this time. Assessment completed, see flowsheet. IV antibiotics and fluids infusing per orders. Patient's bed is locked and in lowest position, side rails up x 2. Non slip socks applied.     
Patient arrived to ICU room 2105 from the ED. Patient ambulated to bathroom from the stretcher independently. Pt was able to dress self into hospital gown and ambulated to the bed. Sinus Tach on the monitor, blood pressure stable, and oxygen stable on RA. Skin assessment completed (see note). Wound noted to Right Foot. Images taken. Blood sugar 292 mg/dL. Insulin gtt titrated per protocol. NS bolus currently infusing. Patient educated about DKA protocol, dietary restrictions, lab draws, and medications. All questions answered at this time. Call light within reach.    Electronically signed by Dillan Paulino RN on 4/13/2025 at 4:23 PM    
Patient arrives to PACU at this time. VSS on 4L per NC with OPA in place. Patient resting comfortably with eyes closed. No signs or symptoms of acute distress. FSBS 207 reported to Dr. Bone.    
[facility]  Glycemic Outcome      NAME: Reilly Alexander  MEDICAL RECORD NUMBER:  [id number]  AGE: 24 y.o.   GENDER: male  : 2000  EPISODE DATE:  2025     Data     Recent Labs     25  0820 25  1250 25  1731 257 25  0820 25  1218   POCGLU 260* 172* 204* 148* 291* 107*       HgBA1c:    Lab Results   Component Value Date/Time    LABA1C 11.6 2025 04:28 PM       BUN/Creatinine:    Lab Results   Component Value Date/Time    BUN 12 2025 05:25 AM    CREATININE 0.4 2025 05:25 AM      Latest Reference Range & Units Most Recent   Est, Glom Filt Rate >60  >90  25 05:25     FIB-4 Calculation: 0.45 at 2025  5:25 AM  Calculated from:  SGOT/AST: 29 U/L at 2025  5:25 AM  SGPT/ALT: 28 U/L at 2025  5:25 AM  Platelets: 316 K/uL at 2025  5:25 AM  Age: 24 years    Medications  Scheduled Medications:   insulin glargine  30 Units SubCUTAneous Daily    insulin lispro  12 Units SubCUTAneous TID WC    sodium chloride flush  5-40 mL IntraVENous 2 times per day    insulin lispro  0-8 Units SubCUTAneous 4x Daily WC    ampicillin-sulbactam  3,000 mg IntraVENous Q6H    enoxaparin  40 mg SubCUTAneous QHS       Diet  Current diet/supplement order: ADULT DIET; Regular; 4 carb choices (60 gm/meal)     Recorded PO: PO Meals Eaten (%): 76 - 100% last meal in flowsheets      Action      Per chart review, pt has had Dexamethasone on  and 2025. Met with pt. Per pt he will have surgery tomorrow and possible D/C later this upcoming week. Pt voiced he is in the process of applying for Medicaid and has everything to take to meet with SW at [facility]. Pt currently is not active with a PCP. [facility] and considering meeting for DM Education and Self management Support after he is discharged. Pt stated he has Dexcom G7  and Sensors at home. Pt stated he will need insulin, insulin pen needles at D/C. Pt 
Physical Therapy  No eval/discharge  Reilyl Alexander  [id number]    PT orders received for this patient s/p sx on R foot. The pt has been seen and d/c from our services 2 other times due to being indep. The pt found to be up on the EOB; educated the pt that he is now NWB again on the R foot. The pt reporting he has been up on his own in the room and walking on the R foot this morning. The pt declines skilled PT needs and reports he has been up on his own and has access to all needed equipment. He will have assist at home. Will d/c from PT caseload due to no skilled PT needs.     Electronically signed by Rahel Donato, PT [id number] on 4/22/2025 at 10:27 AM      
Physical Therapy  No re-eval/discharge  Reilly Alexander  [id number]    New PT orders received per Dr. Bourne post-op and the pt is now WBAT. Spoke with the pt re: his need for another PT eval. The pt is reporting he is still getting up on his own in the room and having no issues with his self-care needs. The pt is declining another PT eval and any therapy needs.  Educated the pt that his weightbearing status has been changed and that if in the future he feels he needs PT to come back to ask the MD and they can re-order. The pt agreeable.     Will sign off due to no needs.     Electronically signed by Rahel Donato, PT [id number] on 4/18/2025 at 1:04 PM      
Pt agreeable to get MRI. Pt returning from MRI at this time.   
Pt arrived to PCU from ICU via wheelchair. Pt is awake alert and oriented able to make needs known. Pt denies pain at this time. Pt placed on portable monitor box #13 verified with CMU. This nurse oriented pt to room, call light and safety measures. This nurse noticed that pt crosses his left leg over his right leg, has been reminded numerous times to not do that. Will place sign in room as reminded. Call light within reach. Will monitor.   
Pt asleep on arrival to PACU.  No sign of pain at present. VSS. Elevated right foot.   
Pt educated to be NPO at midnight for upcoming surgery. This RN offered snack and drink prior to be consumed prior to midnight, pt declined.Electronically signed by Beatriz Peck RN on 4/20/25 at 11:39 PM EDT    
Pt is awake alert and oriented able to make needs known. This nurse did get a call from surgery informing that pt's surgery is at 1645 with  time of 1545. Pt made aware. Dr. Escobar also in to see pt. Pt denies pain. Assessment completed. Call light within reach. Pt reminded to not eat or drink anything due to surgery. Verbalized understanding.   
Pt returned from OR at 1900. Pt is awake alert and oriented. Pt is upset and tearful because he had cheese and summer sausage in the refrigerator and now it is gone. Pt's mom is at bedside and brought him in food. Pt denies pain. IV fluids restarted as ordered. Call light within reach.   
Pt states he will not be going to MRI today due to insurance reasons. Dr. James Irizarry notified. Awaiting response.   
Pt to MRI. Phone call from pt's mother, Judith, stating that pt is between insurances and would like to hold off on MRI for today. Pt in agreement. Social work called to speak to pt and pt's mother. Dr. Erik Moncada notified.   
Pt to have procedure today at 1645.  at 1545. For INCISION AND DRAINAGE RIGHT FOOT - Right with Dr Bourne. Pt to remain NPO. Will monitor.   
Pt's potassium level and phosphorus levels remain low. Pt given PO potassium and IV phosphorus started per PRN order.   
Report called to CHANDA Zamudio. Pt transferred to room 5114, on 5N and bedside handoff completed with CHANDA Zamudio.   
[facility]  Hyperglycemia Event      NAME: Reilly Alexander  MEDICAL RECORD NUMBER:  [id number]  AGE: 24 y.o.   GENDER: male  : 2000  EPISODE DATE:  2025     Data     Recent Labs     25  1100 25  1232 25  1753 25  0813 25  1225   POCGLU 207* 188* 393* 369* 389* 264*       HgBA1c:    Lab Results   Component Value Date/Time    LABA1C 11.6 2025 04:28 PM       BUN/Creatinine:    Lab Results   Component Value Date/Time    BUN 6 2025 04:49 AM    CREATININE 0.5 2025 04:49 AM     GFR Estimated Creatinine Clearance: 260 mL/min (A) (based on SCr of 0.5 mg/dL (L)).    Medications  Scheduled Medications:   insulin lispro  10 Units SubCUTAneous TID WC    [START ON 2025] insulin glargine  25 Units SubCUTAneous Daily    sodium chloride flush  5-40 mL IntraVENous 2 times per day    insulin lispro  0-8 Units SubCUTAneous 4x Daily WC    ampicillin-sulbactam  3,000 mg IntraVENous Q6H    enoxaparin  40 mg SubCUTAneous QHS       Diet  Current diet/supplement order: ADULT DIET; Regular; 4 carb choices (60 gm/meal)  Diet NPO Exceptions are: Sips of Water with Meds     Recorded PO: PO Meals Eaten (%): 76 - 100% last meal in flowsheets      Action     Sleeping.  Does not wake to knock on the door or name.      Diabetes education delayed at this time.  Diabetes content active on education plan.      Noted that insulin doses were increased in response to dexamethasone 4 mg x 1 yesterday.      Delay scheduling [facility] follow up until discharge plan is known.      Electronically signed by Lyndsey Burns RN on 2025 at 4:07 PM  
[facility]  Hyperglycemia Event      NAME: Reilly Alexander  MEDICAL RECORD NUMBER:  [id number]  AGE: 24 y.o.   GENDER: male  : 2000  EPISODE DATE:  2025     Data     Recent Labs     25  1303 25  1549 25  1819 25  1924 25  2040 25  0901   POCGLU 123* 157* 139* 111* 181* 208*       HgBA1c:    Lab Results   Component Value Date/Time    LABA1C 11.6 2025 04:28 PM       BUN/Creatinine:    Lab Results   Component Value Date/Time    BUN 10 2025 04:30 AM    CREATININE 0.4 2025 04:30 AM     GFR Estimated Creatinine Clearance: 325 mL/min (A) (based on SCr of 0.4 mg/dL (L)).    Medications  Scheduled Medications:   sodium chloride flush  5-40 mL IntraVENous 2 times per day    aspirin  325 mg Oral BID    insulin glargine  30 Units SubCUTAneous Daily    insulin lispro  12 Units SubCUTAneous TID WC    sodium chloride flush  5-40 mL IntraVENous 2 times per day    insulin lispro  0-8 Units SubCUTAneous 4x Daily WC    ampicillin-sulbactam  3,000 mg IntraVENous Q6H       Diet  Current diet/supplement order: ADULT DIET; Regular     Recorded PO: PO Meals Eaten (%): 76 - 100% last meal in flowsheets      Action      Recommend BG targets 100 - 180.    Reviewed basal and prandial insulin concepts.      Recommend meds to beds for insulin and 4 mm pen needles.      Recommend Follow up with [facility].      Follow up scheduled:      May    1 New Pharmacy Visit 9:00 AM  [facility]  [address] [place] [id number]  [phone]       Electronically signed by Lyndsey Burns RN on 2025 at 12:06 PM  
Slept well, easily aroused for routine checks. No c/o excessive discomfort. In bed, call light in reach.  
Slept well, easily aroused for routine checks. No c/o excessive discomfort. In bed, call light in reach.    
Surgical consent signed and placed on chart.   
[facility]  Glycemic Outcome      NAME: Reilly Alexander  MEDICAL RECORD NUMBER:  [id number]  AGE: 24 y.o.   GENDER: male  : 2000  EPISODE DATE:  2025     Data     Recent Labs     25  1218 25  1731 25  2106 25  2332 25  0814 25  1303   POCGLU 107* 206* 190* 195* 272* 123*       HgBA1c:    Lab Results   Component Value Date/Time    LABA1C 11.6 2025 04:28 PM       BUN/Creatinine:    Lab Results   Component Value Date/Time    BUN 10 2025 04:30 AM    CREATININE 0.4 2025 04:30 AM      Latest Reference Range & Units Most Recent   Est, Glom Filt Rate >60  >90  25 04:30     FIB-4 Calculation: 0.45 at 2025  5:25 AM  Calculated from:  SGOT/AST: 29 U/L at 2025  5:25 AM  SGPT/ALT: 28 U/L at 2025  5:25 AM  Platelets: 316 K/uL at 2025  5:25 AM  Age: 24 years    Medications  Scheduled Medications:   insulin glargine  30 Units SubCUTAneous Daily    insulin lispro  12 Units SubCUTAneous TID WC    sodium chloride flush  5-40 mL IntraVENous 2 times per day    insulin lispro  0-8 Units SubCUTAneous 4x Daily WC    ampicillin-sulbactam  3,000 mg IntraVENous Q6H    enoxaparin  40 mg SubCUTAneous QHS       Diet  Current diet/supplement order: ADULT DIET; Regular; 4 carb choices (60 gm/meal)     Recorded PO: PO Meals Eaten (%): 76 - 100% last meal in flowsheets      Action      Pt has planned procedure today, at 1500 per pt. Pt has no questions or concerns at this time. Pt aware SW has been notified of need for assistance with medications prior to discharge. .     Per home medication list, pt was prescribed the following DM medications PTA: Relion test strips- test 4 times daily, glucose monitoring it- daily, insulin pen needles (8mm), Basalglar (marked discontinued?)     Glucose Target: 140-180, avoid hypoglycemia     Total Daily Insulin:  2025: 34 units (as of 1320pm)  2025: 62 units      Recommendation(s): Monitor glucoses 
VSS on room air. Pt denies pain. Report called to Karmen ARMAS on 5W. All questions answered. Pt to room 5114 when transport available.   
[facility] Orthopedic Surgery   Progress Note      S/P :  SUBJECTIVE  in bed. Alert and oriented. Hoping he can go home today. Pain is   described in right foot and with the intensity of mild. Pain is described as aching. Pt states able to walk to BR independently with walker      OBJECTIVE              Physical                      VITALS:  BP (!) 128/90   Pulse (!) 101   Temp 98.2 °F (36.8 °C) (Oral)   Resp 16   Ht 1.88 m (6' 2\")   Wt 89.1 kg (196 lb 6.9 oz)   SpO2 98%   BMI 25.22 kg/m²                     MUSCULOSKELETAL:  right foot NVI. Wiggles toes to command.  Brisk cap refill to toes right foot noted. Right foot bulky dressing clean and dry.                    NEUROLOGIC:                                  Sensory:  Touch:  Right Lower Extremity:  normal                                           Data       CBC:   Lab Results   Component Value Date/Time    WBC 4.6 04/20/2025 05:25 AM    RBC 3.67 04/20/2025 05:25 AM    HGB 11.3 04/22/2025 04:52 AM    HCT 32.5 04/22/2025 04:52 AM    MCV 86.2 04/20/2025 05:25 AM    MCH 30.1 04/20/2025 05:25 AM    MCHC 34.9 04/20/2025 05:25 AM    RDW 12.2 04/20/2025 05:25 AM     04/20/2025 05:25 AM    MPV 6.8 04/20/2025 05:25 AM        WBC:    Lab Results   Component Value Date/Time    WBC 4.6 04/20/2025 05:25 AM        Hemoglobin/Hematocrit:    Lab Results   Component Value Date/Time    HGB 11.3 04/22/2025 04:52 AM    HCT 32.5 04/22/2025 04:52 AM        PT/INR:    Lab Results   Component Value Date/Time    PROTIME 14.1 04/13/2025 12:55 PM    INR 1.07 04/13/2025 12:55 PM         Ref Range & Units (hover)    Gram Stain Result  Abnormal   No Epithelial Cells seen  1+ WBC's (Polymorphonuclear)  1+ Gram positive cocci    Anaerobic Culture No anaerobes isolated   Organism Strep agalactiae (Beta Strep Group B) Abnormal    Culture Surgical Heavy growth          Current Inpatient Medications             Current Facility-Administered Medications: 0.45 % sodium chloride infusion, , 
Assessment on the patient. ALL assessment sites listed below have been assessed.      Areas assessed by both nurses: Head, Face, Ears, Shoulders, Back, Chest, Arms, Elbows, Hands, Sacrum. Buttock, Coccyx, Ischium, Legs. Feet and Heels, and Under Medical Devices         Does the Patient have a Wound? Yes wound(s) were present on assessment. LDA wound assessment was Initiated and completed by RN    Wound Care Orders initiated by RN: Yes       Tone Prevention initiated by RN: No    Pressure Injury (Stage 3,4, Unstageable, DTI, NWPT, and Complex wounds) if present, place Wound referral order by RN under : Yes    New Ostomies, if present place, Ostomy referral order under : No     Nurse 1 eSignature: Electronically signed by Xin Cortez RN on 4/14/25 at 6:30 AM EDT    **SHARE this note so that the co-signing nurse can place an eSignature**    Nurse 2 eSignature: Electronically signed by Nkechi Vincent RN on 4/14/25 at 7:35 AM EDT           
by Does not apply route daily 1 kit 0    blood glucose test strips (RELION GLUCOSE TEST STRIPS) strip 1 each by In Vitro route 4 times daily As needed. 100 each 3    Insulin Pen Needle (B-D ULTRAFINE III SHORT PEN) 31G X 8 MM MISC 1 each by Does not apply route daily 100 each 3       Allergies:  Keflex [cephalexin] and Sulfamethoxazole-trimethoprim    Immunizations :   Immunization History   Administered Date(s) Administered    COVID-19, PFIZER PURPLE top, DILUTE for use, (age 12 y+), 30mcg/0.3mL 2021, 2021         Social History:    Social History     Tobacco Use    Smoking status: Former     Current packs/day: 0.00     Types: Cigarettes     Quit date: 2015     Years since quittin.8    Smokeless tobacco: Never   Vaping Use    Vaping status: Never Used   Substance Use Topics    Alcohol use: Never    Drug use: Never     Social History     Tobacco Use   Smoking Status Former    Current packs/day: 0.00    Types: Cigarettes    Quit date: 2015    Years since quittin.8   Smokeless Tobacco Never      Family History   Problem Relation Age of Onset    Hypertension Mother     Diabetes Mother     Cancer Father         Thyroid    Diabetes Father     No Known Problems Brother     No Known Problems Brother           REVIEW OF SYSTEMS:      Constitutional:  ++fevers, ++ chills, night sweats  Eyes:  negative for blurred vision, eye discharge, visual disturbance   HEENT:  negative for hearing loss, ear drainage,nasal congestion  Respiratory:  negative for cough, shortness of breath or hemoptysis   Cardiovascular:  negative for chest pain, palpitations, syncope  Gastrointestinal:  negative for nausea, vomiting, diarrhea, constipation, abdominal pain  Genitourinary:  negative for frequency, dysuria, urinary incontinence, hematuria  Hematologic/Lymphatic:  negative for easy bruising, bleeding and lymphadenopathy  Allergic/Immunologic:  negative for recurrent infections, angioedema, anaphylaxis   Endocrine:  
mL, IntraVENous, PRN  0.9 % sodium chloride infusion, , IntraVENous, PRN  oxyCODONE (ROXICODONE) immediate release tablet 5 mg, 5 mg, Oral, Q4H PRN **OR** oxyCODONE HCl (OXY-IR) immediate release tablet 10 mg, 10 mg, Oral, Q4H PRN  insulin lispro (HUMALOG,ADMELOG) injection vial 0-8 Units, 0-8 Units, SubCUTAneous, 4x Daily WC  ampicillin-sulbactam (UNASYN) 3,000 mg in sodium chloride 0.9 % 100 mL IVPB (addEASE), 3,000 mg, IntraVENous, Q6H  glucose chewable tablet 16 g, 4 tablet, Oral, PRN  glucagon injection 1 mg, 1 mg, SubCUTAneous, PRN  dextrose 10 % infusion, , IntraVENous, Continuous PRN  potassium chloride (KLOR-CON M) extended release tablet 40 mEq, 40 mEq, Oral, PRN **OR** potassium bicarb-citric acid (EFFER-K) effervescent tablet 40 mEq, 40 mEq, Oral, PRN **OR** potassium chloride 10 mEq/100 mL IVPB (Peripheral Line), 10 mEq, IntraVENous, PRN  dextrose bolus 10% 125 mL, 125 mL, IntraVENous, PRN **OR** dextrose bolus 10% 250 mL, 250 mL, IntraVENous, PRN  magnesium sulfate 2000 mg in 50 mL IVPB premix, 2,000 mg, IntraVENous, PRN  sodium phosphate 15 mmol in sodium chloride 0.9 % 250 mL IVPB, 15 mmol, IntraVENous, PRN  polyethylene glycol (GLYCOLAX) packet 17 g, 17 g, Oral, Daily PRN  enoxaparin (LOVENOX) injection 40 mg, 40 mg, SubCUTAneous, QHS  dextrose 5 % and 0.45 % sodium chloride infusion, , IntraVENous, Continuous PRN    ASSESSMENT AND PLAN    Right foot osteomyelitis, deep abscess, Post irrigation and debridement on 4/16 and 4/18 per DR Mercer. Plan Stage 3 debridement today at 445pm  NPO for surgery  Continue antibiotics per MAIA Castaneda, APRN - CNP  4/21/2025  10:25 AM   
management of the case with infectious disease who recommended IV antibiotics        Drugs that require monitoring for toxicity include insulin and the method of monitoring was blood sugar to avoid hypoglycemia as toxicity    Medical Decision Making:  The following items were considered in medical decision making:  Discussion of patient care with other providers  Reviewed clinical lab tests  Reviewed radiology tests  Reviewed other diagnostic tests/interventions  Independent review of radiologic images  Microbiology cultures and other micro tests reviewed      Subjective:     Chief Complaint: Foot infection weakness    Reilly Alexander is a 24 y.o. male who presents with foot infection edema erythema weakness overall slowly improving no fever chills nausea or vomiting today      Review of Systems:      Pertinent positives and negatives discussed in HPI    Objective:     Intake/Output Summary (Last 24 hours) at 4/17/2025 0923  Last data filed at 4/17/2025 0856  Gross per 24 hour   Intake 2189.84 ml   Output 1425 ml   Net 764.84 ml      Vitals:   Vitals:    04/16/25 1544 04/16/25 2008 04/17/25 0334 04/17/25 0707   BP: 129/81 (!) 128/91 119/84 125/86   Pulse: (!) 102 100 97 93   Resp: 18 18 18    Temp: 97.5 °F (36.4 °C) 97.7 °F (36.5 °C) 97.9 °F (36.6 °C) 97.7 °F (36.5 °C)   TempSrc: Oral Oral Oral Oral   SpO2: 100% 100% 100%    Weight:   85 kg (187 lb 6.3 oz)    Height:             Physical Exam:      Physical Exam Performed:    /86   Pulse 93   Temp 97.7 °F (36.5 °C) (Oral)   Resp 18   Ht 1.88 m (6' 2\")   Wt 85 kg (187 lb 6.3 oz)   SpO2 100%   BMI 24.06 kg/m²     General appearance: No apparent distress  Respiratory:  Normal respiratory effort. Clear to auscultation, bilaterally without Rales/Wheezes/Rhonchi.  Cardiovascular: Regular rate and rhythm with normal S1/S2 without murmurs, rubs or gallops.  Abdomen: Soft, non-tender  Musculoskeletal: No clubbing, cyanosis right foot surgically dressed no 
of Home: House  Home Layout: Laundry in basement, Able to Live on Main level with bedroom/bathroom, One level  Home Access: Stairs to enter with rails  Entrance Stairs - Number of Steps: 3  Entrance Stairs - Rails: Right  Bathroom Shower/Tub: Tub/Shower unit  Bathroom Toilet: Standard  Home Equipment:  (grandmother has walkers and canes and shower chair/bench)  Prior Level of Assist for ADLs: Independent  Prior Level of Assist for Homemaking: Independent  Homemaking Responsibilities: Yes  Prior Level of Assist for Ambulation: Independent household ambulator, with or without device, Independent community ambulator, with or without device  Prior Level of Assist for Transfers: Independent  Active : No  Patient's  Info: walks to work but parents drive  Occupation: Full time employment  Type of Occupation: [employer] dept at [employer]    Available Assistance at Discharge: available PRN    Examination   Vision/Hearing  Vision  Vision: Impaired  Vision Exceptions: Wears glasses at all times  Hearing  Hearing: Within functional limits      Posture:   good  Sensation:   WFL    ROM:   (B) LE AROM WFL  Strength:   (B) LE strength grossly WFL  Therapist Clinical Decision Making (Complexity): medium complexity  Clinical Presentation: evolving      Subjective  General: Pt in bed upon arrival.  Pleasant and agreeable to PT eval and treat.  Family/Caregiver present: No  Pain: 0/10  Pain Interventions: not applicable    Functional Mobility  Bed Mobility:  Supine to Sit: Independent  Comments: up in chair at end of session  Comments: HOB slightly elevated  Transfers:  Sit to stand transfer: modified independent  Stand to sit transfer: modified independent  Comments: to RW, cues for hand placement and technique  Ambulation:  Surface:level surface  Assistive Device: rolling walker  Assistance: modified independent  Distance: 50'  Gait Mechanics: Slow adan  Comments: steady with RW support, able to maintain WB 
patient. ALL assessment sites listed below have been assessed.      Areas assessed by both nurses: Head, Face, Ears, Shoulders, Back, Chest, Arms, Elbows, Hands, Sacrum. Buttock, Coccyx, Ischium, Legs. Feet and Heels, and Under Medical Devices         Does the Patient have a Wound? Yes wound(s) were present on assessment. LDA wound assessment was Initiated and completed by RN    Wound Care Orders initiated by RN: Yes       Tone Prevention initiated by RN: No    Pressure Injury (Stage 3,4, Unstageable, DTI, NWPT, and Complex wounds) if present, place Wound referral order by RN under : Yes    New Ostomies, if present place, Ostomy referral order under : No     Nurse 1 eSignature: Electronically signed by Xin Cortez RN on 4/15/25 at 6:58 AM EDT    **SHARE this note so that the co-signing nurse can place an eSignature**    Nurse 2 eSignature: Electronically signed by Nkechi Vincent RN on 4/15/25 at 7:15 AM EDT           
  Component Value Date/Time    ALKPHOS 129 04/13/2025 12:55 PM    ALT 9 04/13/2025 12:55 PM    AST 10 04/13/2025 12:55 PM    BILITOT 0.6 04/13/2025 12:55 PM     UA:  Lab Results   Component Value Date/Time    GLUCOSEU 500mg 05/02/2019 10:01 AM    BILIRUBINUR Negative 05/02/2019 10:01 AM    KETUA 80mg/dl 05/02/2019 10:01 AM    SPECGRAV 1.020 05/02/2019 10:01 AM    BLOODU Negative 05/02/2019 10:01 AM    PHUR 6.0 05/02/2019 10:01 AM    PROTEINU Negative 05/02/2019 10:01 AM    LEUKOCYTESUR Negative 05/02/2019 10:01 AM      Urine Microscopic: No results found for: \"LABCAST\", \"BACTERIA\", \"COMU\", \"HYALCAST\", \"WBCUA\", \"RBCUA\"  Urine Reflex to Culture: No results found for: \"URRFLXCULT\"    hBa1c  11.6     Ph 7.17    MICRO: cultures reviewed and updated by me     Procedure Component Value Units Date/Time   Culture, Anaerobic and Aerobic [[id number]] (Abnormal) Collected: 04/14/25 1420   Order Status: Completed Specimen: Foot Updated: 04/15/25 1045    Gram Stain Result 3+ WBC's (Polymorphonuclear)  2+ Gram positive cocci  No Epithelial Cells seen   Abnormal     Organism Streptococcus dysgalactiae Abnormal     WOUND/ABSCESS --    Light growth  No further workup    Organism Strep agalactiae (Beta Strep Group B) Abnormal     WOUND/ABSCESS --    Light growth  Susceptibility testing of penicillin and other beta lactams is  not necessary for beta hemolytic Streptococci since resistant  strains have not been identified. (CLSI M100)   Narrative:     ORDER#: [id number]                          ORDERED BY: PJ MURRELL  SOURCE: Foot                               COLLECTED:  04/14/25 14:20  ANTIBIOTICS AT HARJIT.:                      RECEIVED :  04/14/25 15:12     Culture, Blood 2 [[id number]]Collected: 04/13/25 1856Order Status: SentSpecimen: BloodUpdated: 04/13/25 1857Clostridium Difficile Toxin/Antigen [[id number]]Order Status: No resultSpecimen: StoolCulture, Anaerobic and Aerobic [[id number]]Order Status: No resultSpecimen: 
is soft tissue swelling of the foot.  There is no evidence of any osteomyelitis.  There is no gas within the soft tissues.     Soft tissue swelling of the foot. No evidence of osteomyelitis.       CBC:   Recent Labs     04/13/25  1255 04/14/25  0453 04/15/25  0414   WBC 16.4* 10.5 6.9   HGB 14.4 11.3* 11.2*    293 268     BMP:    Recent Labs     04/14/25  2150 04/15/25  0414 04/15/25  1557 04/16/25  0426   * 133*  --  139   K 3.5 3.3* 3.2* 3.3*    100  --  103   CO2 19* 20*  --  25   BUN 5* 5*  --  6*   CREATININE 0.5* 0.4*  --  0.3*   GLUCOSE 283* 227*  --  210*     Hepatic:   Recent Labs     04/13/25  1255   AST 10*   ALT 9*   BILITOT 0.6   ALKPHOS 129     Lipids:   Lab Results   Component Value Date/Time    CHOL 241 05/02/2019 08:30 AM    HDL 35 05/02/2019 08:30 AM    TRIG 277 05/02/2019 08:30 AM     Hemoglobin A1C:   Lab Results   Component Value Date/Time    LABA1C 11.6 04/13/2025 04:28 PM     TSH: No results found for: \"TSH\"  Troponin: No results found for: \"TROPONINT\"  Lactic Acid: No results for input(s): \"LACTA\" in the last 72 hours.  BNP: No results for input(s): \"PROBNP\" in the last 72 hours.  UA:  Lab Results   Component Value Date/Time    PHUR 6.0 05/02/2019 10:01 AM    SPECGRAV 1.020 05/02/2019 10:01 AM    LEUKOCYTESUR Negative 05/02/2019 10:01 AM    BILIRUBINUR Negative 05/02/2019 10:01 AM    BLOODU Negative 05/02/2019 10:01 AM    GLUCOSEU 500mg 05/02/2019 10:01 AM    KETUA 80mg/dl 05/02/2019 10:01 AM     Urine Cultures: No results found for: \"LABURIN\"  Blood Cultures:   Lab Results   Component Value Date/Time    BC  04/13/2025 12:55 PM     No Growth to date.  Any change in status will be called.     Lab Results   Component Value Date/Time    BLOODCULT2  04/13/2025 06:56 PM     No Growth to date.  Any change in status will be called.     Organism:   Lab Results   Component Value Date/Time    ORG Streptococcus dysgalactiae 04/14/2025 02:20 PM    ORG Strep agalactiae (Beta Strep Group

## 2025-04-24 ENCOUNTER — HOSPITAL ENCOUNTER (OUTPATIENT)
Dept: INFUSION THERAPY | Age: 25
Setting detail: INFUSION SERIES
Discharge: HOME | End: 2025-04-24

## 2025-04-24 ENCOUNTER — CLINICAL DOCUMENTATION (OUTPATIENT)
Dept: INFECTIOUS DISEASES | Age: 25
End: 2025-04-24

## 2025-04-24 VITALS
SYSTOLIC BLOOD PRESSURE: 111 MMHG | TEMPERATURE: 98.8 F | RESPIRATION RATE: 16 BRPM | DIASTOLIC BLOOD PRESSURE: 70 MMHG | HEART RATE: 122 BPM

## 2025-04-24 DIAGNOSIS — L08.9 DIABETIC FOOT INFECTION (HCC): Primary | ICD-10-CM

## 2025-04-24 DIAGNOSIS — M86.9 DIABETIC FOOT ULCER WITH OSTEOMYELITIS (HCC): Primary | ICD-10-CM

## 2025-04-24 DIAGNOSIS — E11.621 DIABETIC FOOT ULCER WITH OSTEOMYELITIS (HCC): Primary | ICD-10-CM

## 2025-04-24 DIAGNOSIS — E11.69 DIABETIC FOOT ULCER WITH OSTEOMYELITIS (HCC): Primary | ICD-10-CM

## 2025-04-24 DIAGNOSIS — L97.509 DIABETIC FOOT ULCER WITH OSTEOMYELITIS (HCC): Primary | ICD-10-CM

## 2025-04-24 DIAGNOSIS — E11.628 DIABETIC FOOT INFECTION (HCC): Primary | ICD-10-CM

## 2025-04-24 LAB
ALBUMIN SERPL-MCNC: 3.6 G/DL (ref 3.4–5)
ALBUMIN/GLOB SERPL: 1.2 {RATIO} (ref 1.1–2.2)
ALP SERPL-CCNC: 110 U/L (ref 40–129)
ALT SERPL-CCNC: 23 U/L (ref 10–40)
ANION GAP SERPL CALCULATED.3IONS-SCNC: 9 MMOL/L (ref 3–16)
AST SERPL-CCNC: 16 U/L (ref 15–37)
BASOPHILS # BLD: 0 K/UL (ref 0–0.2)
BASOPHILS NFR BLD: 0.6 %
BILIRUB SERPL-MCNC: 0.3 MG/DL (ref 0–1)
BUN SERPL-MCNC: 15 MG/DL (ref 7–20)
CALCIUM SERPL-MCNC: 9.2 MG/DL (ref 8.3–10.6)
CHLORIDE SERPL-SCNC: 101 MMOL/L (ref 99–110)
CO2 SERPL-SCNC: 27 MMOL/L (ref 21–32)
CREAT SERPL-MCNC: 0.4 MG/DL (ref 0.9–1.3)
CRP SERPL-MCNC: <3 MG/L (ref 0–5.1)
DEPRECATED RDW RBC AUTO: 12.7 % (ref 12.4–15.4)
EOSINOPHIL # BLD: 0.1 K/UL (ref 0–0.6)
EOSINOPHIL NFR BLD: 0.9 %
ERYTHROCYTE [SEDIMENTATION RATE] IN BLOOD BY WESTERGREN METHOD: 32 MM/HR (ref 0–15)
GFR SERPLBLD CREATININE-BSD FMLA CKD-EPI: >90 ML/MIN/{1.73_M2}
GLUCOSE SERPL-MCNC: 334 MG/DL (ref 70–99)
HCT VFR BLD AUTO: 37 % (ref 40.5–52.5)
HGB BLD-MCNC: 12.6 G/DL (ref 13.5–17.5)
LYMPHOCYTES # BLD: 1.3 K/UL (ref 1–5.1)
LYMPHOCYTES NFR BLD: 17.7 %
MCH RBC QN AUTO: 30.1 PG (ref 26–34)
MCHC RBC AUTO-ENTMCNC: 34.1 G/DL (ref 31–36)
MCV RBC AUTO: 88.3 FL (ref 80–100)
MONOCYTES # BLD: 0.7 K/UL (ref 0–1.3)
MONOCYTES NFR BLD: 9.1 %
NEUTROPHILS # BLD: 5.3 K/UL (ref 1.7–7.7)
NEUTROPHILS NFR BLD: 71.7 %
PLATELET # BLD AUTO: 331 K/UL (ref 135–450)
PMV BLD AUTO: 6.8 FL (ref 5–10.5)
POTASSIUM SERPL-SCNC: 4.7 MMOL/L (ref 3.5–5.1)
PROT SERPL-MCNC: 6.6 G/DL (ref 6.4–8.2)
RBC # BLD AUTO: 4.19 M/UL (ref 4.2–5.9)
SODIUM SERPL-SCNC: 137 MMOL/L (ref 136–145)
WBC # BLD AUTO: 7.4 K/UL (ref 4–11)

## 2025-04-24 PROCEDURE — 86140 C-REACTIVE PROTEIN: CPT

## 2025-04-24 PROCEDURE — 6360000002 HC RX W HCPCS: Performed by: INTERNAL MEDICINE

## 2025-04-24 PROCEDURE — 2500000003 HC RX 250 WO HCPCS: Performed by: INTERNAL MEDICINE

## 2025-04-24 PROCEDURE — 96374 THER/PROPH/DIAG INJ IV PUSH: CPT

## 2025-04-24 PROCEDURE — 2580000003 HC RX 258: Performed by: INTERNAL MEDICINE

## 2025-04-24 PROCEDURE — 80053 COMPREHEN METABOLIC PANEL: CPT

## 2025-04-24 PROCEDURE — 85652 RBC SED RATE AUTOMATED: CPT

## 2025-04-24 PROCEDURE — 36592 COLLECT BLOOD FROM PICC: CPT

## 2025-04-24 PROCEDURE — 85025 COMPLETE CBC W/AUTO DIFF WBC: CPT

## 2025-04-24 RX ORDER — ACETAMINOPHEN 325 MG/1
650 TABLET ORAL
Status: CANCELLED | OUTPATIENT
Start: 2025-04-25

## 2025-04-24 RX ORDER — DIPHENHYDRAMINE HYDROCHLORIDE 50 MG/ML
50 INJECTION, SOLUTION INTRAMUSCULAR; INTRAVENOUS
Status: CANCELLED | OUTPATIENT
Start: 2025-04-25

## 2025-04-24 RX ORDER — SODIUM CHLORIDE 9 MG/ML
INJECTION, SOLUTION INTRAVENOUS CONTINUOUS
Status: CANCELLED | OUTPATIENT
Start: 2025-04-25

## 2025-04-24 RX ORDER — SODIUM CHLORIDE 0.9 % (FLUSH) 0.9 %
5-40 SYRINGE (ML) INJECTION PRN
Status: DISCONTINUED | OUTPATIENT
Start: 2025-04-24 | End: 2025-04-25 | Stop reason: HOSPADM

## 2025-04-24 RX ORDER — ONDANSETRON 2 MG/ML
8 INJECTION INTRAMUSCULAR; INTRAVENOUS
Status: CANCELLED | OUTPATIENT
Start: 2025-04-25

## 2025-04-24 RX ORDER — SODIUM CHLORIDE 0.9 % (FLUSH) 0.9 %
5-40 SYRINGE (ML) INJECTION PRN
Status: CANCELLED | OUTPATIENT
Start: 2025-04-25

## 2025-04-24 RX ORDER — ALBUTEROL SULFATE 90 UG/1
4 INHALANT RESPIRATORY (INHALATION) PRN
Status: CANCELLED | OUTPATIENT
Start: 2025-04-25

## 2025-04-24 RX ORDER — HYDROCORTISONE SODIUM SUCCINATE 100 MG/2ML
100 INJECTION INTRAMUSCULAR; INTRAVENOUS
Status: CANCELLED | OUTPATIENT
Start: 2025-04-25

## 2025-04-24 RX ORDER — EPINEPHRINE 1 MG/ML
0.3 INJECTION, SOLUTION, CONCENTRATE INTRAVENOUS PRN
Status: CANCELLED | OUTPATIENT
Start: 2025-04-25

## 2025-04-24 RX ADMIN — Medication 30 ML: at 12:30

## 2025-04-24 RX ADMIN — Medication 20 ML: at 12:54

## 2025-04-24 RX ADMIN — SODIUM CHLORIDE 1000 MG: 9 INJECTION INTRAMUSCULAR; INTRAVENOUS; SUBCUTANEOUS at 12:39

## 2025-04-24 ASSESSMENT — PAIN DESCRIPTION - LOCATION: LOCATION: FOOT

## 2025-04-24 ASSESSMENT — PAIN SCALES - GENERAL: PAINLEVEL_OUTOF10: 1

## 2025-04-24 NOTE — PROGRESS NOTES
Outpatient Infusion Center  [facility]    IV Antibiotic Visit    NAME:  Reilly Alexander  YOB: 2000  MEDICAL RECORD NUMBER:  [id number]  DATE:  4/24/2025    Patient arrived to Outpatient Infusion Center   [] per wheelchair   [x] ambulatory with crutch    Itching: No  Rash: No  Mouth Sores: No  Nausea: No  Vomiting: No  Diarrhea: No  Night Sweats: No  Fever: No    Administered: [] Peripheral access    [x] PICC access    [] Port access    Allergies   Allergen Reactions    Keflex [Cephalexin]     Sulfamethoxazole-Trimethoprim Hives     rash       Name of Antibiotic Administered: invanz  Dose of Antibiotic Administered: 1000 mg.    Indication for Antibiotic: foot infection      Response to treatment:  Well tolerated by patient.       Scheduled to return for next antibiotic dose tomorrow.  Signs and symptoms of infection of central line reviewed.  Pt v/u.      Electronically signed by Sindhu Hollingsworth RN on 4/24/2025 at 12:21 PM

## 2025-04-24 NOTE — DISCHARGE INSTRUCTIONS
Outpatient Infusion Discharge Instructions  [facility]  [address] [place], [place] [address]  Telephone: [phone]      FAX [phone]    NAME:  Reilly Alexander  YOB: 2000  MEDICAL RECORD NUMBER:  [id number]  DATE:  @ED@    Reason for Outpatient Infusion Visit: invanz    If you develop any these symptoms please contact you Doctor    [x] Nausea and/or vomiting not relieved with medication   [x] Swelling, redness, and/or bleeding at injection or IV site    [x] Fever or chills  [x] Rash or itching   [x] Shortness of breath  [] Please review After Visit Summary (AVS) information on    [] Other      Outpatient Infusion Center Information: Should you experience any significant changes in your health or have questions about your care please contact the [facility] at [phone] MONDAY - FRIDAY 8:00 am - 4:00 pm.  If you need help outside these hours and cannot wait until we are again available, contact your Primary Care Physician or go to the hospital emergency room.       Electronically signed by Sindhu Hollingsworth RN on 4/24/2025 at 12:57 PM

## 2025-04-24 NOTE — PROGRESS NOTES
Dr. Hill has placed a referral order for pharmacist to manage Outpatient Parental Antimicrobial Therapy (OPAT) pursuant the ID Collaborative Practice Agreement.     Pertinent PMH and HPI:  PMH T1DM    Presents  with worsening R foot wound  Recently moved here from [place] and has insurance issues                Pertinent Objective Data:    Wt Readings from Last 1 Encounters:   25 90.9 kg (200 lb 6.4 oz)      BMI Readings from Last 1 Encounters:   25 25.73 kg/m²      Serum creatinine: 0.4 mg/dL (L) 25 0430  Estimated creatinine clearance: 325 mL/min (A)    Lab Results   Component Value Date    .0 (H) 2025       Imagin/15 MRI:  IMPRESSION:  1.  Acute osteomyelitis involving the 5th metatarsal head/neck and adjacent  proximal phalangeal base, with concern for septic arthropathy of the 5th MTP  joint.  Surrounding soft tissue abscess within the lateral forefoot.  Associated cellulitis/myositis.     2.  Signal abnormalities within the 4th metatarsal head/neck, 4th proximal  phalangeal base and lateral cuneiform which may be reactive or related to  early acute osteomyelitis.     3.  Additional findings, as above.       Micro:    swab: group G strep, GBS, finegoldia, eggerthella   sx cx: GBS    OPAT Orders:  Diagnosis  R DFI with OM s/p I&D , ,      Antimicrobial Regimen and Projected Term Date IV ertapenem 1gm daily- 6/    (6 weeks)   Weekly Lab Monitoring CBCwDiff, CMP, CRP, and ESR   Any additional imaging or data needed prior to term? None   Any follow up in ID clinic? None   OPAT Access/Additional LDA R single lumen PICC   Disposition  [facility] daily      Lab and medication orders have been placed.    Clinical Pharmacist will review patient weekly or as needed and make recommendations regarding the above therapy plan.     Thank you,  Lubna Reveles, PharmD, BCPS  Clinical Pharmacy Specialist- [facility] Infectious Disease  Office Phone:

## 2025-04-25 ENCOUNTER — HOSPITAL ENCOUNTER (OUTPATIENT)
Dept: INFUSION THERAPY | Age: 25
Setting detail: INFUSION SERIES
Discharge: HOME | End: 2025-04-25

## 2025-04-25 VITALS
RESPIRATION RATE: 18 BRPM | HEART RATE: 115 BPM | DIASTOLIC BLOOD PRESSURE: 81 MMHG | OXYGEN SATURATION: 100 % | SYSTOLIC BLOOD PRESSURE: 107 MMHG | TEMPERATURE: 97.9 F

## 2025-04-25 DIAGNOSIS — L97.509 DIABETIC FOOT ULCER WITH OSTEOMYELITIS (HCC): Primary | ICD-10-CM

## 2025-04-25 DIAGNOSIS — E11.69 DIABETIC FOOT ULCER WITH OSTEOMYELITIS (HCC): Primary | ICD-10-CM

## 2025-04-25 DIAGNOSIS — E11.621 DIABETIC FOOT ULCER WITH OSTEOMYELITIS (HCC): Primary | ICD-10-CM

## 2025-04-25 DIAGNOSIS — M86.9 DIABETIC FOOT ULCER WITH OSTEOMYELITIS (HCC): Primary | ICD-10-CM

## 2025-04-25 PROCEDURE — 2500000003 HC RX 250 WO HCPCS: Performed by: INTERNAL MEDICINE

## 2025-04-25 PROCEDURE — 2580000003 HC RX 258: Performed by: INTERNAL MEDICINE

## 2025-04-25 PROCEDURE — 96374 THER/PROPH/DIAG INJ IV PUSH: CPT

## 2025-04-25 PROCEDURE — 6360000002 HC RX W HCPCS: Performed by: INTERNAL MEDICINE

## 2025-04-25 RX ORDER — DIPHENHYDRAMINE HYDROCHLORIDE 50 MG/ML
50 INJECTION, SOLUTION INTRAMUSCULAR; INTRAVENOUS
Status: CANCELLED | OUTPATIENT
Start: 2025-04-26

## 2025-04-25 RX ORDER — ONDANSETRON 2 MG/ML
8 INJECTION INTRAMUSCULAR; INTRAVENOUS
Status: CANCELLED | OUTPATIENT
Start: 2025-04-26

## 2025-04-25 RX ORDER — SODIUM CHLORIDE 0.9 % (FLUSH) 0.9 %
5-40 SYRINGE (ML) INJECTION PRN
Status: DISCONTINUED | OUTPATIENT
Start: 2025-04-25 | End: 2025-04-26 | Stop reason: HOSPADM

## 2025-04-25 RX ORDER — ALBUTEROL SULFATE 90 UG/1
4 INHALANT RESPIRATORY (INHALATION) PRN
Status: CANCELLED | OUTPATIENT
Start: 2025-04-26

## 2025-04-25 RX ORDER — ACETAMINOPHEN 325 MG/1
650 TABLET ORAL
Status: CANCELLED | OUTPATIENT
Start: 2025-04-26

## 2025-04-25 RX ORDER — SODIUM CHLORIDE 0.9 % (FLUSH) 0.9 %
5-40 SYRINGE (ML) INJECTION PRN
Status: CANCELLED | OUTPATIENT
Start: 2025-04-26

## 2025-04-25 RX ORDER — SODIUM CHLORIDE 9 MG/ML
INJECTION, SOLUTION INTRAVENOUS CONTINUOUS
Status: CANCELLED | OUTPATIENT
Start: 2025-04-26

## 2025-04-25 RX ORDER — EPINEPHRINE 1 MG/ML
0.3 INJECTION, SOLUTION, CONCENTRATE INTRAVENOUS PRN
Status: CANCELLED | OUTPATIENT
Start: 2025-04-26

## 2025-04-25 RX ORDER — HYDROCORTISONE SODIUM SUCCINATE 100 MG/2ML
100 INJECTION INTRAMUSCULAR; INTRAVENOUS
Status: CANCELLED | OUTPATIENT
Start: 2025-04-26

## 2025-04-25 RX ADMIN — SODIUM CHLORIDE 1000 MG: 9 INJECTION INTRAMUSCULAR; INTRAVENOUS; SUBCUTANEOUS at 08:25

## 2025-04-25 RX ADMIN — Medication 30 ML: at 08:40

## 2025-04-25 NOTE — PROGRESS NOTES
Outpatient Infusion Center  [facility]    IV Antibiotic Visit    NAME:  Reilly Alexander  YOB: 2000  MEDICAL RECORD NUMBER:  [id number]  DATE:  4/25/2025    Patient arrived to Outpatient Infusion Center   [] per wheelchair   [x] ambulatory     Patient is on daily Invanz for 6 weeks for Osteomyelitis of right foot. Recently moved back home after living in [place]. No insurance at present but he is going to apply for medicaid.     Itching: No  Rash: No  Mouth Sores: No  Nausea: No  Vomiting: No  Diarrhea: No  Night Sweats: No  Fever: No    Administered: [] Peripheral access    [x] PICC access    [] Port access    Allergies   Allergen Reactions    Keflex [Cephalexin]     Sulfamethoxazole-Trimethoprim Hives     rash       Name of Antibiotic Administered: Invanz  Dose of Antibiotic Administered: 1 gram mg.    Indication for Antibiotic: osteomyelitis right foot.      Response to treatment:  Well tolerated by patient.       Scheduled to return for next antibiotic dose tomorrow     Electronically signed by Nu Mo RN on 4/25/2025 at 9:00 AM

## 2025-04-26 ENCOUNTER — HOSPITAL ENCOUNTER (OUTPATIENT)
Dept: INFUSION THERAPY | Age: 25
Setting detail: INFUSION SERIES
Discharge: HOME | End: 2025-04-26

## 2025-04-26 VITALS
HEART RATE: 108 BPM | DIASTOLIC BLOOD PRESSURE: 82 MMHG | SYSTOLIC BLOOD PRESSURE: 117 MMHG | TEMPERATURE: 97.5 F | OXYGEN SATURATION: 100 % | RESPIRATION RATE: 18 BRPM

## 2025-04-26 DIAGNOSIS — L97.509 DIABETIC FOOT ULCER WITH OSTEOMYELITIS (HCC): Primary | ICD-10-CM

## 2025-04-26 DIAGNOSIS — M86.9 DIABETIC FOOT ULCER WITH OSTEOMYELITIS (HCC): Primary | ICD-10-CM

## 2025-04-26 DIAGNOSIS — E11.69 DIABETIC FOOT ULCER WITH OSTEOMYELITIS (HCC): Primary | ICD-10-CM

## 2025-04-26 DIAGNOSIS — E11.621 DIABETIC FOOT ULCER WITH OSTEOMYELITIS (HCC): Primary | ICD-10-CM

## 2025-04-26 PROCEDURE — 2500000003 HC RX 250 WO HCPCS: Performed by: INTERNAL MEDICINE

## 2025-04-26 PROCEDURE — 6360000002 HC RX W HCPCS: Performed by: INTERNAL MEDICINE

## 2025-04-26 PROCEDURE — 96374 THER/PROPH/DIAG INJ IV PUSH: CPT

## 2025-04-26 PROCEDURE — 2580000003 HC RX 258: Performed by: INTERNAL MEDICINE

## 2025-04-26 RX ORDER — ONDANSETRON 2 MG/ML
8 INJECTION INTRAMUSCULAR; INTRAVENOUS
Status: CANCELLED | OUTPATIENT
Start: 2025-04-27

## 2025-04-26 RX ORDER — DIPHENHYDRAMINE HYDROCHLORIDE 50 MG/ML
50 INJECTION, SOLUTION INTRAMUSCULAR; INTRAVENOUS
Status: CANCELLED | OUTPATIENT
Start: 2025-04-27

## 2025-04-26 RX ORDER — SODIUM CHLORIDE 0.9 % (FLUSH) 0.9 %
5-40 SYRINGE (ML) INJECTION PRN
Status: DISCONTINUED | OUTPATIENT
Start: 2025-04-26 | End: 2025-04-27 | Stop reason: HOSPADM

## 2025-04-26 RX ORDER — HYDROCORTISONE SODIUM SUCCINATE 100 MG/2ML
100 INJECTION INTRAMUSCULAR; INTRAVENOUS
Status: CANCELLED | OUTPATIENT
Start: 2025-04-27

## 2025-04-26 RX ORDER — ACETAMINOPHEN 325 MG/1
650 TABLET ORAL
Status: CANCELLED | OUTPATIENT
Start: 2025-04-27

## 2025-04-26 RX ORDER — SODIUM CHLORIDE 9 MG/ML
INJECTION, SOLUTION INTRAVENOUS CONTINUOUS
Status: CANCELLED | OUTPATIENT
Start: 2025-04-27

## 2025-04-26 RX ORDER — EPINEPHRINE 1 MG/ML
0.3 INJECTION, SOLUTION, CONCENTRATE INTRAVENOUS PRN
Status: CANCELLED | OUTPATIENT
Start: 2025-04-27

## 2025-04-26 RX ORDER — SODIUM CHLORIDE 0.9 % (FLUSH) 0.9 %
5-40 SYRINGE (ML) INJECTION PRN
Status: CANCELLED | OUTPATIENT
Start: 2025-04-27

## 2025-04-26 RX ORDER — ALBUTEROL SULFATE 90 UG/1
4 INHALANT RESPIRATORY (INHALATION) PRN
Status: CANCELLED | OUTPATIENT
Start: 2025-04-27

## 2025-04-26 RX ADMIN — SODIUM CHLORIDE, PRESERVATIVE FREE 30 ML: 5 INJECTION INTRAVENOUS at 10:19

## 2025-04-26 RX ADMIN — SODIUM CHLORIDE 1000 MG: 9 INJECTION INTRAMUSCULAR; INTRAVENOUS; SUBCUTANEOUS at 10:05

## 2025-04-26 RX ADMIN — SODIUM CHLORIDE, PRESERVATIVE FREE 10 ML: 5 INJECTION INTRAVENOUS at 10:03

## 2025-04-26 ASSESSMENT — PAIN SCALES - GENERAL: PAINLEVEL_OUTOF10: 0

## 2025-04-26 NOTE — PROGRESS NOTES
Outpatient Infusion Center  [facility]    IV Antibiotic Visit    NAME:  Reilly Alexander  YOB: 2000  MEDICAL RECORD NUMBER:  [id number]  DATE:  4/26/2025    Patient arrived to Outpatient Infusion Center   [] per wheelchair   [x] ambulatory - heel walk only on right foot due to wound    Alert and oriented x 4. Patient with gauze dressing on right foot. Dressing is dry and intact and patient reports that he is wrapping his foot in a bag when he goes outside to keep it dry. Patient reports that he is performing daily dressing changes and that the wound is \"looking good\".     Itching: No  Rash: No  Mouth Sores: No  Nausea: No  Vomiting: No  Diarrhea: Yes - having 2-3 loose stools per day. Discussed eating yogurt 2x/day or starting Probiotics. Patient verbalized understanding.  Night Sweats: No  Fever: No    Administered: [] Peripheral access    [x] PICC access - single lumen, right basilic    [] Port access    Allergies   Allergen Reactions    Keflex [Cephalexin]     Sulfamethoxazole-Trimethoprim Hives     rash       Name of Antibiotic Administered: Invanz  Dose of Antibiotic Administered: 1000 mg.    Indication for Antibiotic: Right foot osteomyelitis      Response to treatment:  Well tolerated by patient.     Follow up: Scheduled to return for next antibiotic dose tomorrow     Electronically signed by Aurora Rodriguez RN on 4/26/2025 at 11:22 AM

## 2025-04-27 ENCOUNTER — HOSPITAL ENCOUNTER (OUTPATIENT)
Dept: INFUSION THERAPY | Age: 25
Setting detail: INFUSION SERIES
Discharge: HOME | End: 2025-04-27

## 2025-04-27 VITALS
DIASTOLIC BLOOD PRESSURE: 90 MMHG | OXYGEN SATURATION: 100 % | HEART RATE: 118 BPM | TEMPERATURE: 97.9 F | SYSTOLIC BLOOD PRESSURE: 121 MMHG

## 2025-04-27 DIAGNOSIS — M86.9 DIABETIC FOOT ULCER WITH OSTEOMYELITIS (HCC): Primary | ICD-10-CM

## 2025-04-27 DIAGNOSIS — L97.509 DIABETIC FOOT ULCER WITH OSTEOMYELITIS (HCC): Primary | ICD-10-CM

## 2025-04-27 DIAGNOSIS — E11.621 DIABETIC FOOT ULCER WITH OSTEOMYELITIS (HCC): Primary | ICD-10-CM

## 2025-04-27 DIAGNOSIS — E11.69 DIABETIC FOOT ULCER WITH OSTEOMYELITIS (HCC): Primary | ICD-10-CM

## 2025-04-27 PROCEDURE — 2500000003 HC RX 250 WO HCPCS: Performed by: INTERNAL MEDICINE

## 2025-04-27 PROCEDURE — 96374 THER/PROPH/DIAG INJ IV PUSH: CPT

## 2025-04-27 PROCEDURE — 6360000002 HC RX W HCPCS: Performed by: INTERNAL MEDICINE

## 2025-04-27 PROCEDURE — 2580000003 HC RX 258: Performed by: INTERNAL MEDICINE

## 2025-04-27 RX ORDER — HYDROCORTISONE SODIUM SUCCINATE 100 MG/2ML
100 INJECTION INTRAMUSCULAR; INTRAVENOUS
Status: CANCELLED | OUTPATIENT
Start: 2025-04-28

## 2025-04-27 RX ORDER — ALBUTEROL SULFATE 90 UG/1
4 INHALANT RESPIRATORY (INHALATION) PRN
Status: CANCELLED | OUTPATIENT
Start: 2025-04-28

## 2025-04-27 RX ORDER — DIPHENHYDRAMINE HYDROCHLORIDE 50 MG/ML
50 INJECTION, SOLUTION INTRAMUSCULAR; INTRAVENOUS
Status: CANCELLED | OUTPATIENT
Start: 2025-04-28

## 2025-04-27 RX ORDER — ONDANSETRON 2 MG/ML
8 INJECTION INTRAMUSCULAR; INTRAVENOUS
Status: CANCELLED | OUTPATIENT
Start: 2025-04-28

## 2025-04-27 RX ORDER — SODIUM CHLORIDE 0.9 % (FLUSH) 0.9 %
5-40 SYRINGE (ML) INJECTION PRN
Status: DISCONTINUED | OUTPATIENT
Start: 2025-04-27 | End: 2025-04-28 | Stop reason: HOSPADM

## 2025-04-27 RX ORDER — ACETAMINOPHEN 325 MG/1
650 TABLET ORAL
Status: CANCELLED | OUTPATIENT
Start: 2025-04-28

## 2025-04-27 RX ORDER — EPINEPHRINE 1 MG/ML
0.3 INJECTION, SOLUTION, CONCENTRATE INTRAVENOUS PRN
Status: CANCELLED | OUTPATIENT
Start: 2025-04-28

## 2025-04-27 RX ORDER — SODIUM CHLORIDE 0.9 % (FLUSH) 0.9 %
5-40 SYRINGE (ML) INJECTION PRN
Status: CANCELLED | OUTPATIENT
Start: 2025-04-28

## 2025-04-27 RX ORDER — SODIUM CHLORIDE 9 MG/ML
INJECTION, SOLUTION INTRAVENOUS CONTINUOUS
Status: CANCELLED | OUTPATIENT
Start: 2025-04-28

## 2025-04-27 RX ADMIN — SODIUM CHLORIDE, PRESERVATIVE FREE 10 ML: 5 INJECTION INTRAVENOUS at 09:53

## 2025-04-27 RX ADMIN — SODIUM CHLORIDE 1000 MG: 9 INJECTION INTRAMUSCULAR; INTRAVENOUS; SUBCUTANEOUS at 09:54

## 2025-04-27 RX ADMIN — SODIUM CHLORIDE, PRESERVATIVE FREE 20 ML: 5 INJECTION INTRAVENOUS at 10:03

## 2025-04-27 NOTE — PROGRESS NOTES
Outpatient Infusion Center  [facility]    IV Antibiotic Visit    NAME:  Reilly Alexander  YOB: 2000  MEDICAL RECORD NUMBER:  [id number]  DATE:  4/27/2025    Patient arrived to Outpatient Infusion Center   [] per wheelchair   [x] ambulatory     Alert and oriented X 4. Denies side effects from last infusion. Dressing in place to right foot.     Itching: No  Rash: No  Mouth Sores: No  Nausea: No  Vomiting: No  Diarrhea: X 1, instructed re probiotics. States is eating yogurt  Night Sweats: No  Fever: No    Administered: [] Peripheral access    [x] PICC access    [] Port access    Allergies   Allergen Reactions    Keflex [Cephalexin]     Sulfamethoxazole-Trimethoprim Hives     rash       Name of Antibiotic Administered: Invanz  Dose of Antibiotic Administered: 1000 mg.    Indication for Antibiotic: right foot osteomyelitis      Response to treatment:  Well tolerated by patient.       Scheduled to return for next antibiotic dose tomorrow     Electronically signed by EMORY BOOGIE RN on 4/27/2025 at 9:07 AM

## 2025-04-27 NOTE — DISCHARGE INSTRUCTIONS
Outpatient Infusion Discharge Instructions  [facility]  [address] [place], [place] [address]  Telephone: [phone]      FAX [phone]    NAME:  Reilly Alexander  YOB: 2000  MEDICAL RECORD NUMBER:  [id number]  DATE:  4/27/25    Reason for Outpatient Infusion Visit: Invanz    If you develop any these symptoms please contact you Doctor    [] Nausea and/or vomiting not relieved with medication   [x] Swelling, redness, and/or bleeding at injection or IV site    [] Fever or chills  [x] Rash or itching   [] Shortness of breath  [x] Please review After Visit Summary (AVS) information on  Invanz  [] Other     * Daily antibiotic, reviewed side effects and when to call MD. Declined printed AVS.      Outpatient Infusion Center Information: Should you experience any significant changes in your health or have questions about your care please contact the [facility] at [phone] MONDAY - FRIDAY 8:00 am - 4:00 pm.  If you need help outside these hours and cannot wait until we are again available, contact your Primary Care Physician or go to the hospital emergency room.       Electronically signed by EMORY BOOGIE RN on 4/27/2025 at 9:08 AM

## 2025-04-28 ENCOUNTER — HOSPITAL ENCOUNTER (OUTPATIENT)
Dept: INFUSION THERAPY | Age: 25
Setting detail: INFUSION SERIES
Discharge: HOME | End: 2025-04-28

## 2025-04-28 ENCOUNTER — TELEPHONE (OUTPATIENT)
Dept: INFECTIOUS DISEASES | Age: 25
End: 2025-04-28

## 2025-04-28 VITALS
SYSTOLIC BLOOD PRESSURE: 128 MMHG | TEMPERATURE: 98.1 F | HEART RATE: 115 BPM | DIASTOLIC BLOOD PRESSURE: 95 MMHG | OXYGEN SATURATION: 100 %

## 2025-04-28 DIAGNOSIS — L97.509 DIABETIC FOOT ULCER WITH OSTEOMYELITIS (HCC): Primary | ICD-10-CM

## 2025-04-28 DIAGNOSIS — E11.69 DIABETIC FOOT ULCER WITH OSTEOMYELITIS (HCC): Primary | ICD-10-CM

## 2025-04-28 DIAGNOSIS — E11.621 DIABETIC FOOT ULCER WITH OSTEOMYELITIS (HCC): Primary | ICD-10-CM

## 2025-04-28 DIAGNOSIS — M86.9 DIABETIC FOOT ULCER WITH OSTEOMYELITIS (HCC): Primary | ICD-10-CM

## 2025-04-28 PROCEDURE — 6360000002 HC RX W HCPCS: Performed by: INTERNAL MEDICINE

## 2025-04-28 PROCEDURE — 96365 THER/PROPH/DIAG IV INF INIT: CPT

## 2025-04-28 PROCEDURE — 2580000003 HC RX 258: Performed by: INTERNAL MEDICINE

## 2025-04-28 PROCEDURE — 2500000003 HC RX 250 WO HCPCS: Performed by: INTERNAL MEDICINE

## 2025-04-28 RX ORDER — EPINEPHRINE 1 MG/ML
0.3 INJECTION, SOLUTION, CONCENTRATE INTRAVENOUS PRN
Status: CANCELLED | OUTPATIENT
Start: 2025-04-29

## 2025-04-28 RX ORDER — ALBUTEROL SULFATE 90 UG/1
4 INHALANT RESPIRATORY (INHALATION) PRN
Status: CANCELLED | OUTPATIENT
Start: 2025-04-29

## 2025-04-28 RX ORDER — SODIUM CHLORIDE 9 MG/ML
INJECTION, SOLUTION INTRAVENOUS CONTINUOUS
Status: CANCELLED | OUTPATIENT
Start: 2025-04-29

## 2025-04-28 RX ORDER — ONDANSETRON 2 MG/ML
8 INJECTION INTRAMUSCULAR; INTRAVENOUS
Status: CANCELLED | OUTPATIENT
Start: 2025-04-29

## 2025-04-28 RX ORDER — DIPHENHYDRAMINE HYDROCHLORIDE 50 MG/ML
50 INJECTION, SOLUTION INTRAMUSCULAR; INTRAVENOUS
Status: CANCELLED | OUTPATIENT
Start: 2025-04-29

## 2025-04-28 RX ORDER — ACETAMINOPHEN 325 MG/1
650 TABLET ORAL
Status: CANCELLED | OUTPATIENT
Start: 2025-04-29

## 2025-04-28 RX ORDER — HYDROCORTISONE SODIUM SUCCINATE 100 MG/2ML
100 INJECTION INTRAMUSCULAR; INTRAVENOUS
Status: CANCELLED | OUTPATIENT
Start: 2025-04-29

## 2025-04-28 RX ORDER — SODIUM CHLORIDE 0.9 % (FLUSH) 0.9 %
5-40 SYRINGE (ML) INJECTION PRN
Status: CANCELLED | OUTPATIENT
Start: 2025-04-29

## 2025-04-28 RX ORDER — SODIUM CHLORIDE 0.9 % (FLUSH) 0.9 %
5-40 SYRINGE (ML) INJECTION PRN
Status: DISCONTINUED | OUTPATIENT
Start: 2025-04-28 | End: 2025-04-29 | Stop reason: HOSPADM

## 2025-04-28 RX ADMIN — SODIUM CHLORIDE, PRESERVATIVE FREE 10 ML: 5 INJECTION INTRAVENOUS at 08:31

## 2025-04-28 RX ADMIN — SODIUM CHLORIDE 1000 MG: 9 INJECTION INTRAMUSCULAR; INTRAVENOUS; SUBCUTANEOUS at 08:33

## 2025-04-28 RX ADMIN — SODIUM CHLORIDE, PRESERVATIVE FREE 20 ML: 5 INJECTION INTRAVENOUS at 08:56

## 2025-04-28 NOTE — PROGRESS NOTES
Outpatient Infusion Center  [facility]    IV Antibiotic Visit    NAME:  Reilly Alexander  YOB: 2000  MEDICAL RECORD NUMBER:  [id number]  DATE:  4/28/2025    Patient arrived to Outpatient Infusion Center   [] per wheelchair   [x] ambulatory with crutch    Itching: No  Rash: No  Mouth Sores: No  Nausea: No  Vomiting: No  Diarrhea: No  Night Sweats: No  Fever: No    Administered: [] Peripheral access    [x] PICC access    [] Port access    Allergies   Allergen Reactions    Keflex [Cephalexin]     Sulfamethoxazole-Trimethoprim Hives     rash       Name of Antibiotic Administered: invanz  Dose of Antibiotic Administered: 1000 mg.    Indication for Antibiotic: foot ulcer      Response to treatment:  Well tolerated by patient.       Scheduled to return for next antibiotic dose tomorrow     Electronically signed by Sindhu Hollingsworth RN on 4/28/2025 at 8:31 AM      Pt states there is no plans for seeing f/u for wound.  States he is changing dressing everyday and concerned that no one is following with wound.  Per Discharge summary from hospital, patient was given instructions to call wound center and set up appointment.  Pt is unaware of this.  Wound care center called on behalf of patient and appt set up for Thursday at 1045.  Location explained to patient.  V/u.

## 2025-04-28 NOTE — TELEPHONE ENCOUNTER
OPAT Nurse Coordinator Weekly Update Note    Current OPAT plan:  Ertapenem 1 gm iv daily through 6/4/25    Diagnosis:  R DM foot infection / osteomyelitis     Assessment:  spoke with pt.  He is doing well and tolerating IV ATB without adverse SE.  Pt goes to the hospitals daily for infusion.  Pt states his foot is \"good\", no c/o pain.  Pt remains WBAT on right foot.  Pt denies fevers.    Follow up appts:  Dr Brown 5/1/25

## 2025-04-28 NOTE — DISCHARGE INSTRUCTIONS
Outpatient Infusion Discharge Instructions  [facility]  [address] [place], [place] [address]  Telephone: [phone]      FAX [phone]    NAME:  Reilly Alexander  YOB: 2000  MEDICAL RECORD NUMBER:  [id number]  DATE:  @ED@    Reason for Outpatient Infusion Visit: invanz    If you develop any these symptoms please contact you Doctor    [x] Nausea and/or vomiting not relieved with medication   [x] Swelling, redness, and/or bleeding at injection or IV site    [x] Fever or chills  [x] Rash or itching   [x] Shortness of breath  [] Please review After Visit Summary (AVS) information on    [] Other      Outpatient Infusion Center Information: Should you experience any significant changes in your health or have questions about your care please contact the [facility] at [phone] MONDAY - FRIDAY 8:00 am - 4:00 pm.  If you need help outside these hours and cannot wait until we are again available, contact your Primary Care Physician or go to the hospital emergency room.       Electronically signed by Sindhu Hollingsworth RN on 4/28/2025 at 9:01 AM

## 2025-04-29 ENCOUNTER — HOSPITAL ENCOUNTER (OUTPATIENT)
Dept: INFUSION THERAPY | Age: 25
Setting detail: INFUSION SERIES
Discharge: HOME | End: 2025-04-29

## 2025-04-29 VITALS
WEIGHT: 205 LBS | TEMPERATURE: 98.2 F | RESPIRATION RATE: 16 BRPM | SYSTOLIC BLOOD PRESSURE: 124 MMHG | HEIGHT: 74 IN | BODY MASS INDEX: 26.31 KG/M2 | DIASTOLIC BLOOD PRESSURE: 91 MMHG | HEART RATE: 106 BPM | OXYGEN SATURATION: 100 %

## 2025-04-29 DIAGNOSIS — M86.9 DIABETIC FOOT ULCER WITH OSTEOMYELITIS (HCC): Primary | ICD-10-CM

## 2025-04-29 DIAGNOSIS — E11.621 DIABETIC FOOT ULCER WITH OSTEOMYELITIS (HCC): Primary | ICD-10-CM

## 2025-04-29 DIAGNOSIS — L97.509 DIABETIC FOOT ULCER WITH OSTEOMYELITIS (HCC): Primary | ICD-10-CM

## 2025-04-29 DIAGNOSIS — E11.69 DIABETIC FOOT ULCER WITH OSTEOMYELITIS (HCC): Primary | ICD-10-CM

## 2025-04-29 PROCEDURE — 6360000002 HC RX W HCPCS: Performed by: INTERNAL MEDICINE

## 2025-04-29 PROCEDURE — 2500000003 HC RX 250 WO HCPCS: Performed by: INTERNAL MEDICINE

## 2025-04-29 PROCEDURE — 2580000003 HC RX 258: Performed by: INTERNAL MEDICINE

## 2025-04-29 PROCEDURE — 99212 OFFICE O/P EST SF 10 MIN: CPT

## 2025-04-29 PROCEDURE — 96374 THER/PROPH/DIAG INJ IV PUSH: CPT

## 2025-04-29 RX ORDER — DIPHENHYDRAMINE HYDROCHLORIDE 50 MG/ML
50 INJECTION, SOLUTION INTRAMUSCULAR; INTRAVENOUS
Status: CANCELLED | OUTPATIENT
Start: 2025-04-30

## 2025-04-29 RX ORDER — SODIUM CHLORIDE 9 MG/ML
INJECTION, SOLUTION INTRAVENOUS CONTINUOUS
Status: CANCELLED | OUTPATIENT
Start: 2025-04-30

## 2025-04-29 RX ORDER — ALBUTEROL SULFATE 90 UG/1
4 INHALANT RESPIRATORY (INHALATION) PRN
Status: CANCELLED | OUTPATIENT
Start: 2025-04-30

## 2025-04-29 RX ORDER — ACETAMINOPHEN 325 MG/1
650 TABLET ORAL
Status: CANCELLED | OUTPATIENT
Start: 2025-04-30

## 2025-04-29 RX ORDER — ONDANSETRON 2 MG/ML
8 INJECTION INTRAMUSCULAR; INTRAVENOUS
Status: CANCELLED | OUTPATIENT
Start: 2025-04-30

## 2025-04-29 RX ORDER — SODIUM CHLORIDE 0.9 % (FLUSH) 0.9 %
5-40 SYRINGE (ML) INJECTION PRN
Status: CANCELLED | OUTPATIENT
Start: 2025-04-30

## 2025-04-29 RX ORDER — SODIUM CHLORIDE 0.9 % (FLUSH) 0.9 %
5-40 SYRINGE (ML) INJECTION PRN
Status: DISCONTINUED | OUTPATIENT
Start: 2025-04-29 | End: 2025-04-30 | Stop reason: HOSPADM

## 2025-04-29 RX ORDER — EPINEPHRINE 1 MG/ML
0.3 INJECTION, SOLUTION, CONCENTRATE INTRAVENOUS PRN
Status: CANCELLED | OUTPATIENT
Start: 2025-04-30

## 2025-04-29 RX ORDER — HYDROCORTISONE SODIUM SUCCINATE 100 MG/2ML
100 INJECTION INTRAMUSCULAR; INTRAVENOUS
Status: CANCELLED | OUTPATIENT
Start: 2025-04-30

## 2025-04-29 RX ADMIN — SODIUM CHLORIDE 1000 MG: 9 INJECTION INTRAMUSCULAR; INTRAVENOUS; SUBCUTANEOUS at 08:41

## 2025-04-29 RX ADMIN — SODIUM CHLORIDE, PRESERVATIVE FREE 30 ML: 5 INJECTION INTRAVENOUS at 08:50

## 2025-04-29 NOTE — DISCHARGE INSTRUCTIONS
Outpatient Infusion Discharge Instructions  [facility]  [address] [place], [place] [address]  Telephone: [phone]      FAX [phone]    NAME:  Reilly Alexander  YOB: 2000  MEDICAL RECORD NUMBER:  [id number]  DATE:  @ED@    Reason for Outpatient Infusion Visit: IV antibiotic    If you develop any these symptoms please contact you Doctor    [x] Nausea and/or vomiting not relieved with medication   [x] Swelling, redness, and/or bleeding at injection or IV site    [x] Fever or chills  [x] Rash or itching   [x] Shortness of breath  [x] Please review After Visit Summary (AVS) information on    [] Other      Outpatient Infusion Center Information: Should you experience any significant changes in your health or have questions about your care please contact the [facility] at [phone] MONDAY - FRIDAY 8:00 am - 4:00 pm.  If you need help outside these hours and cannot wait until we are again available, contact your Primary Care Physician or go to the hospital emergency room.       Electronically signed by Citlalli Nunez RN on 4/29/2025 at 8:46 AM

## 2025-04-29 NOTE — PROGRESS NOTES
Outpatient Infusion Center  [facility]    IV Antibiotic Visit    NAME:  Reilly Alexander  YOB: 2000  MEDICAL RECORD NUMBER:  [id number]  DATE:  4/29/2025    Patient arrived to Outpatient Infusion Center   [] per wheelchair   [x] ambulatory with 1 crutch    Patient alert and oriented x4.     Itching: No  Rash: No  Mouth Sores: No  Nausea: Yes at times; patient instructed to call doctor if it gets worse.  Vomiting: No  Diarrhea: Yes ; 1 or 2 times a day  Night Sweats: No  Fever: No    Administered: [] Peripheral access    [x] PICC access    [] Port access    Allergies   Allergen Reactions    Keflex [Cephalexin]     Sulfamethoxazole-Trimethoprim Hives     rash       Name of Antibiotic Administered: Invanz  Dose of Antibiotic Administered: 1000 mg.    Indication for Antibiotic: right foot ulcer      Response to treatment:  Well tolerated by patient.       Scheduled to return for next antibiotic dose tomorrow     Electronically signed by Citlalli Nunez RN on 4/29/2025 at 8:24 AM

## 2025-04-29 NOTE — PROGRESS NOTES
Clinic Level of Care Assessment  Infusion Center      NAME:  Reilly Alexander  YOB: 2000 GENDER: male  MEDICAL RECORD NUMBER:  [id number]   DATE:  4/29/2025     Ambulation Status Document in Daily Care/Safety Tab   Status Definition Points   Independent Independently able to ambulate.  Fully able (without any assistance) to get on/off exam table/chair.  [x]   0   Minimal Physical Assistance Requires physical assistance of one person to ambulate and/or position patient to be examined. Includes necessary physical assistance to position lower extremities on/off stool. []   1   Moderate Physical Assistance Requires at least one staff member to physically assist patient in ambulating into treatment room, and/or on off chair/bed.  Requires assistance to bathroom. []   2   Full Assistance Requires assistance of at least two staff members to transfer patient into treatment room and/or on/off bed/chair. \"Total Transfer\".  Unable to use bathroom requires bedside commode and/or bedpan []   3       Dressing Complexity Document in LDA Navigator  Complexity Definition Points   No Dressing  []   0   Simple Minimal, simple dressing. i.e. bandaid, gauze, simple wrap.  Peripheral IV dressing. []   1   Intermediate Moderately complicated PICC dressing change requiring sterile procedure and site assessment. [x]   2   Complex Complicated dressing change port access requiring sterile procedure and site assessment.  []   3       Teaching Effort Document in AVS and/or Education Navigator    Effort Definition Points   No Teaching  []   0   Simple Teach two or less topics.  Teaching documented in discharge instructions in AVS and copy given to patient. [x]   1   Intermediate Teach three to four topics.  Teaching documented in Discharge Instructions in AVS using References and Attachments. Copy given to patient.   []   2   Complex Teach five to six topics. Teaching documented in Discharge Instructions in AVS using References and

## 2025-04-29 NOTE — PROGRESS NOTES
Outpatient Infusion Center   [facility]    PICC Dressing Change    NAME:  Reilly Alexander  YOB: 2000  MEDICAL RECORD NUMBER:  [id number]  DATE:  4/29/2025    Patient arrived to Outpatient Infusion Center   [] per wheelchair   [x] ambulatory with crutch    Patient alert and oriented x4.     PICC Location:right arm    PICC Site:  Redness: No  Bruising: No   Edema: No  Pain: No     PICC Cleansed:  Current dressing and stat lock removed: Yes  Chloroprep Scrub for 30 seconds and air dried completely for 1 minute: Yes     PICC Dressing Change  Skin barrier: Yes  Stat lock applied and PICC line secured Yes  Biopatch applied: Yes   PICC site covered with Tegaderm Yes  Date and initials applied to PICC dressing Yes  [] Other:    Next Dressing Due: May 6, 2025.     Response to treatment:  Well tolerated by patient.      Electronically signed by Citlalli Nunez RN on 4/29/2025 at 8:56 AM

## 2025-04-30 ENCOUNTER — HOSPITAL ENCOUNTER (OUTPATIENT)
Dept: INFUSION THERAPY | Age: 25
Setting detail: INFUSION SERIES
Discharge: HOME | End: 2025-04-30

## 2025-04-30 VITALS
RESPIRATION RATE: 16 BRPM | HEART RATE: 105 BPM | OXYGEN SATURATION: 100 % | DIASTOLIC BLOOD PRESSURE: 92 MMHG | TEMPERATURE: 97.9 F | SYSTOLIC BLOOD PRESSURE: 139 MMHG

## 2025-04-30 DIAGNOSIS — E11.69 DIABETIC FOOT ULCER WITH OSTEOMYELITIS (HCC): Primary | ICD-10-CM

## 2025-04-30 DIAGNOSIS — L97.509 DIABETIC FOOT ULCER WITH OSTEOMYELITIS (HCC): Primary | ICD-10-CM

## 2025-04-30 DIAGNOSIS — E11.621 DIABETIC FOOT ULCER WITH OSTEOMYELITIS (HCC): Primary | ICD-10-CM

## 2025-04-30 DIAGNOSIS — M86.9 DIABETIC FOOT ULCER WITH OSTEOMYELITIS (HCC): Primary | ICD-10-CM

## 2025-04-30 PROCEDURE — 6360000002 HC RX W HCPCS: Performed by: INTERNAL MEDICINE

## 2025-04-30 PROCEDURE — 2500000003 HC RX 250 WO HCPCS: Performed by: INTERNAL MEDICINE

## 2025-04-30 PROCEDURE — 2580000003 HC RX 258: Performed by: INTERNAL MEDICINE

## 2025-04-30 PROCEDURE — 96374 THER/PROPH/DIAG INJ IV PUSH: CPT

## 2025-04-30 RX ORDER — SODIUM CHLORIDE 9 MG/ML
INJECTION, SOLUTION INTRAVENOUS CONTINUOUS
Status: CANCELLED | OUTPATIENT
Start: 2025-05-01

## 2025-04-30 RX ORDER — ONDANSETRON 2 MG/ML
8 INJECTION INTRAMUSCULAR; INTRAVENOUS
Status: CANCELLED | OUTPATIENT
Start: 2025-05-01

## 2025-04-30 RX ORDER — SODIUM CHLORIDE 0.9 % (FLUSH) 0.9 %
5-40 SYRINGE (ML) INJECTION PRN
Status: CANCELLED | OUTPATIENT
Start: 2025-05-01

## 2025-04-30 RX ORDER — EPINEPHRINE 1 MG/ML
0.3 INJECTION, SOLUTION, CONCENTRATE INTRAVENOUS PRN
Status: CANCELLED | OUTPATIENT
Start: 2025-05-01

## 2025-04-30 RX ORDER — ALBUTEROL SULFATE 90 UG/1
4 INHALANT RESPIRATORY (INHALATION) PRN
Status: CANCELLED | OUTPATIENT
Start: 2025-05-01

## 2025-04-30 RX ORDER — DIPHENHYDRAMINE HYDROCHLORIDE 50 MG/ML
50 INJECTION, SOLUTION INTRAMUSCULAR; INTRAVENOUS
Status: CANCELLED | OUTPATIENT
Start: 2025-05-01

## 2025-04-30 RX ORDER — HYDROCORTISONE SODIUM SUCCINATE 100 MG/2ML
100 INJECTION INTRAMUSCULAR; INTRAVENOUS
Status: CANCELLED | OUTPATIENT
Start: 2025-05-01

## 2025-04-30 RX ORDER — ACETAMINOPHEN 325 MG/1
650 TABLET ORAL
Status: CANCELLED | OUTPATIENT
Start: 2025-05-01

## 2025-04-30 RX ORDER — SODIUM CHLORIDE 0.9 % (FLUSH) 0.9 %
5-40 SYRINGE (ML) INJECTION PRN
Status: DISCONTINUED | OUTPATIENT
Start: 2025-04-30 | End: 2025-05-01 | Stop reason: HOSPADM

## 2025-04-30 RX ADMIN — Medication 10 ML: at 08:00

## 2025-04-30 RX ADMIN — SODIUM CHLORIDE 1000 MG: 9 INJECTION INTRAMUSCULAR; INTRAVENOUS; SUBCUTANEOUS at 08:28

## 2025-04-30 RX ADMIN — Medication 20 ML: at 08:53

## 2025-04-30 ASSESSMENT — PAIN SCALES - GENERAL: PAINLEVEL_OUTOF10: 0

## 2025-04-30 NOTE — PROGRESS NOTES
Outpatient Infusion Center  [facility]    IV Antibiotic Visit    NAME:  Reilly Alexander  YOB: 2000  MEDICAL RECORD NUMBER:  [id number]  DATE:  4/30/2025    Patient arrived to Outpatient Infusion Center   [] per wheelchair   [x] ambulatory     Itching: No  Rash: No  Mouth Sores: No  Nausea: No  Vomiting: No  Diarrhea: No  Night Sweats: No  Fever: No    Administered: [] Peripheral access    [x] PICC access    [] Port access    Allergies   Allergen Reactions    Keflex [Cephalexin]     Sulfamethoxazole-Trimethoprim Hives     rash       Name of Antibiotic Administered: Invanz  Dose of Antibiotic Administered: 1000 mg.    Indication for Antibiotic: osteomyelitis      Response to treatment:  Well tolerated by patient.       Scheduled to return for next antibiotic dose tomorrow     Electronically signed by Guadalupe Ruth RN on 4/30/2025 at 8:38 AM

## 2025-05-01 ENCOUNTER — HOSPITAL ENCOUNTER (OUTPATIENT)
Dept: INFUSION THERAPY | Age: 25
Setting detail: INFUSION SERIES
Discharge: HOME | End: 2025-05-01

## 2025-05-01 ENCOUNTER — PHARMACY VISIT (OUTPATIENT)
Dept: PHARMACY | Age: 25
End: 2025-05-01

## 2025-05-01 ENCOUNTER — HOSPITAL ENCOUNTER (OUTPATIENT)
Dept: WOUND CARE | Age: 25
Discharge: HOME | End: 2025-05-01
Attending: NURSE PRACTITIONER

## 2025-05-01 VITALS
HEART RATE: 112 BPM | RESPIRATION RATE: 16 BRPM | TEMPERATURE: 97.9 F | HEIGHT: 74 IN | OXYGEN SATURATION: 100 % | BODY MASS INDEX: 26.31 KG/M2 | SYSTOLIC BLOOD PRESSURE: 112 MMHG | WEIGHT: 205 LBS | DIASTOLIC BLOOD PRESSURE: 81 MMHG

## 2025-05-01 VITALS
TEMPERATURE: 97.5 F | DIASTOLIC BLOOD PRESSURE: 71 MMHG | RESPIRATION RATE: 16 BRPM | SYSTOLIC BLOOD PRESSURE: 108 MMHG | HEART RATE: 114 BPM

## 2025-05-01 DIAGNOSIS — E11.69 DIABETIC FOOT ULCER WITH OSTEOMYELITIS (HCC): Primary | ICD-10-CM

## 2025-05-01 DIAGNOSIS — M79.89 LEG SWELLING: ICD-10-CM

## 2025-05-01 DIAGNOSIS — L97.413 DIABETIC ULCER OF RIGHT MIDFOOT ASSOCIATED WITH TYPE 2 DIABETES MELLITUS, WITH NECROSIS OF MUSCLE (HCC): Primary | ICD-10-CM

## 2025-05-01 DIAGNOSIS — M86.9 DIABETIC FOOT ULCER WITH OSTEOMYELITIS (HCC): Primary | ICD-10-CM

## 2025-05-01 DIAGNOSIS — L97.509 DIABETIC FOOT ULCER WITH OSTEOMYELITIS (HCC): Primary | ICD-10-CM

## 2025-05-01 DIAGNOSIS — E11.621 DIABETIC ULCER OF RIGHT MIDFOOT ASSOCIATED WITH TYPE 2 DIABETES MELLITUS, WITH NECROSIS OF MUSCLE (HCC): Primary | ICD-10-CM

## 2025-05-01 DIAGNOSIS — E10.10 DKA, TYPE 1, NOT AT GOAL (HCC): Primary | ICD-10-CM

## 2025-05-01 DIAGNOSIS — E11.621 DIABETIC FOOT ULCER WITH OSTEOMYELITIS (HCC): Primary | ICD-10-CM

## 2025-05-01 LAB
ALBUMIN SERPL-MCNC: 3.9 G/DL (ref 3.4–5)
ALBUMIN/GLOB SERPL: 1.3 {RATIO} (ref 1.1–2.2)
ALP SERPL-CCNC: 81 U/L (ref 40–129)
ALT SERPL-CCNC: 12 U/L (ref 10–40)
ANION GAP SERPL CALCULATED.3IONS-SCNC: 8 MMOL/L (ref 3–16)
AST SERPL-CCNC: 20 U/L (ref 15–37)
BASOPHILS # BLD: 0.1 K/UL (ref 0–0.2)
BASOPHILS NFR BLD: 1.2 %
BILIRUB SERPL-MCNC: 0.5 MG/DL (ref 0–1)
BUN SERPL-MCNC: 22 MG/DL (ref 7–20)
CALCIUM SERPL-MCNC: 9 MG/DL (ref 8.3–10.6)
CHLORIDE SERPL-SCNC: 105 MMOL/L (ref 99–110)
CO2 SERPL-SCNC: 24 MMOL/L (ref 21–32)
CREAT SERPL-MCNC: 0.4 MG/DL (ref 0.9–1.3)
CRP SERPL-MCNC: <3 MG/L (ref 0–5.1)
DEPRECATED RDW RBC AUTO: 13 % (ref 12.4–15.4)
EOSINOPHIL # BLD: 0.1 K/UL (ref 0–0.6)
EOSINOPHIL NFR BLD: 0.9 %
ERYTHROCYTE [SEDIMENTATION RATE] IN BLOOD BY WESTERGREN METHOD: 29 MM/HR (ref 0–15)
GFR SERPLBLD CREATININE-BSD FMLA CKD-EPI: >90 ML/MIN/{1.73_M2}
GLUCOSE SERPL-MCNC: 135 MG/DL (ref 70–99)
HCT VFR BLD AUTO: 38.3 % (ref 40.5–52.5)
HGB BLD-MCNC: 13.1 G/DL (ref 13.5–17.5)
LYMPHOCYTES # BLD: 1.4 K/UL (ref 1–5.1)
LYMPHOCYTES NFR BLD: 23.1 %
MCH RBC QN AUTO: 30 PG (ref 26–34)
MCHC RBC AUTO-ENTMCNC: 34.1 G/DL (ref 31–36)
MCV RBC AUTO: 87.9 FL (ref 80–100)
MONOCYTES # BLD: 0.5 K/UL (ref 0–1.3)
MONOCYTES NFR BLD: 7.6 %
NEUTROPHILS # BLD: 4.2 K/UL (ref 1.7–7.7)
NEUTROPHILS NFR BLD: 67.2 %
PLATELET # BLD AUTO: 271 K/UL (ref 135–450)
PMV BLD AUTO: 7.2 FL (ref 5–10.5)
POTASSIUM SERPL-SCNC: 4.3 MMOL/L (ref 3.5–5.1)
PROT SERPL-MCNC: 6.9 G/DL (ref 6.4–8.2)
RBC # BLD AUTO: 4.35 M/UL (ref 4.2–5.9)
SODIUM SERPL-SCNC: 137 MMOL/L (ref 136–145)
WBC # BLD AUTO: 6.2 K/UL (ref 4–11)

## 2025-05-01 PROCEDURE — 2580000003 HC RX 258: Performed by: INTERNAL MEDICINE

## 2025-05-01 PROCEDURE — 11046 DBRDMT MUSC&/FSCA EA ADDL: CPT

## 2025-05-01 PROCEDURE — 85025 COMPLETE CBC W/AUTO DIFF WBC: CPT

## 2025-05-01 PROCEDURE — 6360000002 HC RX W HCPCS: Performed by: INTERNAL MEDICINE

## 2025-05-01 PROCEDURE — 80053 COMPREHEN METABOLIC PANEL: CPT

## 2025-05-01 PROCEDURE — 99203 OFFICE O/P NEW LOW 30 MIN: CPT | Performed by: SPEECH-LANGUAGE PATHOLOGIST

## 2025-05-01 PROCEDURE — 2500000003 HC RX 250 WO HCPCS: Performed by: INTERNAL MEDICINE

## 2025-05-01 PROCEDURE — 86140 C-REACTIVE PROTEIN: CPT

## 2025-05-01 PROCEDURE — 11043 DBRDMT MUSC&/FSCA 1ST 20/<: CPT

## 2025-05-01 PROCEDURE — 85652 RBC SED RATE AUTOMATED: CPT

## 2025-05-01 PROCEDURE — 96374 THER/PROPH/DIAG INJ IV PUSH: CPT

## 2025-05-01 PROCEDURE — 99213 OFFICE O/P EST LOW 20 MIN: CPT

## 2025-05-01 RX ORDER — LIDOCAINE 50 MG/G
OINTMENT TOPICAL PRN
OUTPATIENT
Start: 2025-05-01

## 2025-05-01 RX ORDER — BETAMETHASONE DIPROPIONATE 0.5 MG/G
CREAM TOPICAL PRN
OUTPATIENT
Start: 2025-05-01

## 2025-05-01 RX ORDER — BACITRACIN ZINC AND POLYMYXIN B SULFATE 500; 1000 [USP'U]/G; [USP'U]/G
OINTMENT TOPICAL PRN
OUTPATIENT
Start: 2025-05-01

## 2025-05-01 RX ORDER — SODIUM CHLORIDE 0.9 % (FLUSH) 0.9 %
5-40 SYRINGE (ML) INJECTION PRN
OUTPATIENT
Start: 2025-05-02

## 2025-05-01 RX ORDER — LIDOCAINE HYDROCHLORIDE 20 MG/ML
JELLY TOPICAL PRN
OUTPATIENT
Start: 2025-05-01

## 2025-05-01 RX ORDER — SODIUM CHLOR/HYPOCHLOROUS ACID 0.033 %
SOLUTION, IRRIGATION IRRIGATION PRN
OUTPATIENT
Start: 2025-05-01

## 2025-05-01 RX ORDER — NEOMYCIN/BACITRACIN/POLYMYXINB 3.5-400-5K
OINTMENT (GRAM) TOPICAL PRN
OUTPATIENT
Start: 2025-05-01

## 2025-05-01 RX ORDER — LIDOCAINE HYDROCHLORIDE 40 MG/ML
SOLUTION TOPICAL PRN
OUTPATIENT
Start: 2025-05-01

## 2025-05-01 RX ORDER — EPINEPHRINE 1 MG/ML
0.3 INJECTION, SOLUTION, CONCENTRATE INTRAVENOUS PRN
OUTPATIENT
Start: 2025-05-02

## 2025-05-01 RX ORDER — GENTAMICIN SULFATE 1 MG/G
OINTMENT TOPICAL PRN
OUTPATIENT
Start: 2025-05-01

## 2025-05-01 RX ORDER — CLOBETASOL PROPIONATE 0.5 MG/G
OINTMENT TOPICAL PRN
OUTPATIENT
Start: 2025-05-01

## 2025-05-01 RX ORDER — MUPIROCIN 20 MG/G
OINTMENT TOPICAL PRN
OUTPATIENT
Start: 2025-05-01

## 2025-05-01 RX ORDER — ALBUTEROL SULFATE 90 UG/1
4 INHALANT RESPIRATORY (INHALATION) PRN
OUTPATIENT
Start: 2025-05-02

## 2025-05-01 RX ORDER — ONDANSETRON 2 MG/ML
8 INJECTION INTRAMUSCULAR; INTRAVENOUS
OUTPATIENT
Start: 2025-05-02

## 2025-05-01 RX ORDER — LIDOCAINE HYDROCHLORIDE 40 MG/ML
SOLUTION TOPICAL PRN
Status: DISCONTINUED | OUTPATIENT
Start: 2025-05-01 | End: 2025-05-02 | Stop reason: HOSPADM

## 2025-05-01 RX ORDER — LIDOCAINE 40 MG/G
CREAM TOPICAL PRN
OUTPATIENT
Start: 2025-05-01

## 2025-05-01 RX ORDER — GINSENG 100 MG
CAPSULE ORAL PRN
OUTPATIENT
Start: 2025-05-01

## 2025-05-01 RX ORDER — SODIUM CHLORIDE 9 MG/ML
INJECTION, SOLUTION INTRAVENOUS CONTINUOUS
OUTPATIENT
Start: 2025-05-02

## 2025-05-01 RX ORDER — ACETAMINOPHEN 325 MG/1
650 TABLET ORAL
OUTPATIENT
Start: 2025-05-02

## 2025-05-01 RX ORDER — HYDROCORTISONE SODIUM SUCCINATE 100 MG/2ML
100 INJECTION INTRAMUSCULAR; INTRAVENOUS
OUTPATIENT
Start: 2025-05-02

## 2025-05-01 RX ORDER — TRIAMCINOLONE ACETONIDE 1 MG/G
OINTMENT TOPICAL PRN
OUTPATIENT
Start: 2025-05-01

## 2025-05-01 RX ORDER — DIPHENHYDRAMINE HYDROCHLORIDE 50 MG/ML
50 INJECTION, SOLUTION INTRAMUSCULAR; INTRAVENOUS
OUTPATIENT
Start: 2025-05-02

## 2025-05-01 RX ORDER — SODIUM CHLORIDE 0.9 % (FLUSH) 0.9 %
5-40 SYRINGE (ML) INJECTION PRN
Status: DISCONTINUED | OUTPATIENT
Start: 2025-05-01 | End: 2025-05-02 | Stop reason: HOSPADM

## 2025-05-01 RX ADMIN — LIDOCAINE HYDROCHLORIDE: 40 SOLUTION TOPICAL at 12:32

## 2025-05-01 RX ADMIN — SODIUM CHLORIDE 1000 MG: 9 INJECTION INTRAMUSCULAR; INTRAVENOUS; SUBCUTANEOUS at 10:24

## 2025-05-01 RX ADMIN — SODIUM CHLORIDE, PRESERVATIVE FREE 30 ML: 5 INJECTION INTRAVENOUS at 10:37

## 2025-05-01 ASSESSMENT — PAIN SCALES - GENERAL: PAINLEVEL_OUTOF10: 0

## 2025-05-01 NOTE — PROGRESS NOTES
Outpatient Infusion Center  [facility]    IV Antibiotic Visit    NAME:  Relily Alexander  YOB: 2000  MEDICAL RECORD NUMBER:  [id number]  DATE:  5/1/2025    Patient arrived to Outpatient Infusion Center   [] per wheelchair   [x] ambulatory with scooter    Patient alert and oriented x4.    Itching: No  Rash: No  Mouth Sores: No  Nausea: No  Vomiting: No  Diarrhea: No  Night Sweats: No  Fever: No    Administered: [] Peripheral access    [x] PICC access    [] Port access    Allergies   Allergen Reactions    Keflex [Cephalexin]     Sulfamethoxazole-Trimethoprim Hives     rash       Name of Antibiotic Administered: Invanz  Dose of Antibiotic Administered: 1000 mg.    Indication for Antibiotic: right foot ulcer      Response to treatment:  Well tolerated by patient.       Scheduled to return for next antibiotic dose tomorrow     Electronically signed by Citlalli Nunez RN on 5/1/2025 at 10:27 AM

## 2025-05-01 NOTE — PATIENT INSTRUCTIONS
Patient Instructions:    Continue Lantus 38 units nightly  Continue Humalog 12 units three times daily before meals  Check your blood sugar before each meal and at bedtime.  Call us if you are having blood sugars less than 70 mg/dl or greater than 300 mg/dl.      If you have a blood sugar less than 70 mg/dl, eat or drink 15 grams of carbohydrate (sugar) and recheck your blood sugar in 15 minutes.  Keep repeating this if necessary until your blood sugar is 70 mg/dl or above.

## 2025-05-01 NOTE — PROGRESS NOTES
relieved by the above interventions, please notify your family doctor.     Seek immediate medical care if:    You have symptoms of infection, such as:  Increased pain, swelling, warmth, or redness.  Red streaks leading from the area.  Pus draining from the area.  A fever.     _______________________________________________________________________________________________    : CITLALLI      Electronically signed by Citlalli BELTRE on 5/1/2025 at 11:53 AM     Wound Care Center Information: Should you experience any significant changes in your wound(s) or have questions about your wound care, please contact the [facility] at [phone].   MONDAY through THURSDAY 8:00 am - 4:00 pm.  Friday 8:00 am - 11:30 am.   The office is closed on all major holidays.   (Hours of operation are subject to change).     Please give us 24-48 business hours to return your call.  If you need help with your wounds and cannot wait until we are available, contact your Primary Care Physician or go to your preferred emergency room.      Physician Signature:_______________________    Date: ___________ Time:  ____________    Ryan Brown CNP        Electronically signed by DU Calle CNP on 5/1/2025 at 12:18 PM

## 2025-05-01 NOTE — PATIENT INSTRUCTIONS
draining from the area.  A fever.     _______________________________________________________________________________________________    : CITLALLI      Electronically signed by Citlalli BELTRE on 5/1/2025 at 11:53 AM     Wound Care Center Information: Should you experience any significant changes in your wound(s) or have questions about your wound care, please contact the [facility] at [phone].   MONDAY through THURSDAY 8:00 am - 4:00 pm.  Friday 8:00 am - 11:30 am.   The office is closed on all major holidays.   (Hours of operation are subject to change).     Please give us 24-48 business hours to return your call.  If you need help with your wounds and cannot wait until we are available, contact your Primary Care Physician or go to your preferred emergency room.      Physician Signature:_______________________    Date: ___________ Time:  ____________    Ryan Brown CNP

## 2025-05-01 NOTE — PROGRESS NOTES
Outpatient Infusion Center   [facility]    PICC Lab Draw    NAME:  Reilly Alexander  YOB: 2000  MEDICAL RECORD NUMBER:  [id number]  DATE:  5/1/2025    Patient arrived to Outpatient Infusion Center   [] per wheelchair   [x] ambulatory with Rollator    PICC labs drawn per CHANDA Oviedo:    PICC Location: right arm/right basilic    PICC Site:  Redness: No  Bruising: No   Edema: No  Pain: No     PICC Labs  Cap cleansed with Chloroprep Scrub for 30 seconds and air dried completely for 1 minute on sterile 4X4: Yes    Blood drawn using a 10 ml syringe    Amount of blood wasted 10 ml syringe      Labs Obtained: Yes  Lab Test(s) Ordered: CBC with diff, CMP, ESR and CRP    PICC Flushed with 20ml NS: Yes then Invanz started    New Cap applied: Yes     Response to treatment:  Well tolerated by patient.      Electronically signed by Aurora Rodriguez RN on 5/1/2025 at 5:14 PM

## 2025-05-01 NOTE — DISCHARGE INSTRUCTIONS
Outpatient Infusion Discharge Instructions  [facility]  [address] [place], [place] [address]  Telephone: [phone]      FAX [phone]    NAME:  Reilly Alexander  YOB: 2000  MEDICAL RECORD NUMBER:  [id number]  DATE:  @ED@    Reason for Outpatient Infusion Visit: IV antibiotics    If you develop any these symptoms please contact you Doctor    [x] Nausea and/or vomiting not relieved with medication   [x] Swelling, redness, and/or bleeding at injection or IV site    [x] Fever or chills  [x] Rash or itching   [x] Shortness of breath  [x] Please review After Visit Summary (AVS) information on    [] Other      Outpatient Infusion Center Information: Should you experience any significant changes in your health or have questions about your care please contact the [facility] at [phone] MONDAY - FRIDAY 8:00 am - 4:00 pm.  If you need help outside these hours and cannot wait until we are again available, contact your Primary Care Physician or go to the hospital emergency room.       Electronically signed by Citlalli Nunez RN on 5/1/2025 at 10:38 AM

## 2025-05-02 ENCOUNTER — HOSPITAL ENCOUNTER (OUTPATIENT)
Dept: INFUSION THERAPY | Age: 25
Setting detail: INFUSION SERIES
Discharge: HOME | End: 2025-05-02

## 2025-05-05 ENCOUNTER — TELEPHONE (OUTPATIENT)
Dept: INFECTIOUS DISEASES | Age: 25
End: 2025-05-05

## 2025-05-05 NOTE — PROGRESS NOTES
[facility]  Outpatient Wellness Center  Pharmacy  Diabetes Service    [address] [place], [place] [address]  Phone: [phone]  Fax: [phone]    NAME: Reilly Alexander  MEDICAL RECORD NUMBER:  [id number]  AGE: 24 y.o.   GENDER: male  : 2000  EPISODE DATE:  2025       Reilly Alexander was referred to the Wellness Center by Dr. James Irizarry.  for Diabetes services.   Special Instructions per the Referral Include: Patient Education and Medication Management      ICD-10-CM    1. DKA, type 1, not at goal (HCC)  E10.10           Goals: A1C: < 7 , not currently at goal.    Medication adjustments or recommendations will follow ADA guidelines unless otherwise specified on the referral.    Sylvain Grover is a 24 y.o. here for the Diabetes Service for self-management education, medication review including over the counter medications and herbal products, overall wellbeing assessment, transition of care and any needed adjustments with updates and recommendations communicated to the referring physician.  Reilly Alexander appears well and in no acute distress. Comes  using a scooter to keep weight off of his foot . Is here today alone for diabetes management. Reilly appears ready, willing and able to engage in self-management activities.     Pertinent findings in the office today:  Foot ulcerations    Date of diabetes diagnosis: Greater than one year - diagnosed in 2019    Ongoing factors affecting care:   - Has been without a PCP (used to see Dr. Potter, but has not seen since 2019).    - States his mother has diabetes, but is unsure if Type I or Type II or how long she has had it    Objective   Objective   There were no vitals taken for this visit.    Past Medical History:   Diagnosis Date    Diabetes (HCC)     type 1    Prehypertension        Social History     Tobacco Use    Smoking status: Former     Current packs/day: 0.00     Types: Cigarettes     Quit date: 2015     Years

## 2025-05-05 NOTE — TELEPHONE ENCOUNTER
Left  for pt asking for a return call to discuss how he is doing this week, how his foot looks and when his next he next sees podiatry.  My name, title, Dr Hill's name, specialty and affiliation  Pt name   My direct number [phone]    Second attempted to reach pt unsuccessful.  Did not leave another message    Received return call from pt -     OPAT Nurse Coordinator Weekly Update Note    Current OPAT plan:  Ertapenem 1 gm iv daily through 6/4/25    Diagnosis:  R DM foot infection / osteomyelitis     Assessment:  Spoke with pt.  He states he is tolerating IV ATB without adverse SE.  Pt has no pain in foot.  [facility] and is starting with [facility] - pt does not remember the [facility] agency at time of call    Contacted Jessa, clinical pharmacist at [facility], and verified OPAT orders -   Ertapenem 1 gm iv daily through 6/4/25  CBC w diff, CMP, ESR, CRP every Mon or Tue, FAX results to [phone]  Jessa also unaware of who [facility] agency is that is following pt    Follow up appts:  Wound care clinic 5/8/25

## 2025-05-06 ENCOUNTER — HOSPITAL ENCOUNTER (OUTPATIENT)
Dept: INFUSION THERAPY | Age: 25
Setting detail: INFUSION SERIES
Discharge: HOME | End: 2025-05-06

## 2025-05-06 VITALS
SYSTOLIC BLOOD PRESSURE: 122 MMHG | OXYGEN SATURATION: 100 % | TEMPERATURE: 97.7 F | RESPIRATION RATE: 16 BRPM | DIASTOLIC BLOOD PRESSURE: 85 MMHG | HEART RATE: 115 BPM

## 2025-05-06 DIAGNOSIS — L97.509 DIABETIC FOOT ULCER WITH OSTEOMYELITIS (HCC): Primary | ICD-10-CM

## 2025-05-06 DIAGNOSIS — E11.621 DIABETIC FOOT ULCER WITH OSTEOMYELITIS (HCC): Primary | ICD-10-CM

## 2025-05-06 DIAGNOSIS — M86.9 DIABETIC FOOT ULCER WITH OSTEOMYELITIS (HCC): Primary | ICD-10-CM

## 2025-05-06 DIAGNOSIS — E11.69 DIABETIC FOOT ULCER WITH OSTEOMYELITIS (HCC): Primary | ICD-10-CM

## 2025-05-06 LAB
ALBUMIN SERPL-MCNC: 3.9 G/DL (ref 3.4–5)
ALBUMIN/GLOB SERPL: 1.3 {RATIO} (ref 1.1–2.2)
ALP SERPL-CCNC: 78 U/L (ref 40–129)
ALT SERPL-CCNC: 28 U/L (ref 10–40)
ANION GAP SERPL CALCULATED.3IONS-SCNC: 9 MMOL/L (ref 3–16)
AST SERPL-CCNC: 20 U/L (ref 15–37)
BASOPHILS # BLD: 0 K/UL (ref 0–0.2)
BASOPHILS NFR BLD: 0.7 %
BILIRUB SERPL-MCNC: 0.4 MG/DL (ref 0–1)
BUN SERPL-MCNC: 23 MG/DL (ref 7–20)
CALCIUM SERPL-MCNC: 9.3 MG/DL (ref 8.3–10.6)
CHLORIDE SERPL-SCNC: 104 MMOL/L (ref 99–110)
CO2 SERPL-SCNC: 26 MMOL/L (ref 21–32)
CREAT SERPL-MCNC: 0.4 MG/DL (ref 0.9–1.3)
CRP SERPL-MCNC: <3 MG/L (ref 0–5.1)
DEPRECATED RDW RBC AUTO: 12.8 % (ref 12.4–15.4)
EOSINOPHIL # BLD: 0.1 K/UL (ref 0–0.6)
EOSINOPHIL NFR BLD: 1.4 %
ERYTHROCYTE [SEDIMENTATION RATE] IN BLOOD BY WESTERGREN METHOD: 16 MM/HR (ref 0–15)
GFR SERPLBLD CREATININE-BSD FMLA CKD-EPI: >90 ML/MIN/{1.73_M2}
GLUCOSE SERPL-MCNC: 184 MG/DL (ref 70–99)
HCT VFR BLD AUTO: 40.2 % (ref 40.5–52.5)
HGB BLD-MCNC: 13.4 G/DL (ref 13.5–17.5)
LYMPHOCYTES # BLD: 1.5 K/UL (ref 1–5.1)
LYMPHOCYTES NFR BLD: 32 %
MCH RBC QN AUTO: 29.5 PG (ref 26–34)
MCHC RBC AUTO-ENTMCNC: 33.4 G/DL (ref 31–36)
MCV RBC AUTO: 88.4 FL (ref 80–100)
MONOCYTES # BLD: 0.5 K/UL (ref 0–1.3)
MONOCYTES NFR BLD: 10.7 %
NEUTROPHILS # BLD: 2.5 K/UL (ref 1.7–7.7)
NEUTROPHILS NFR BLD: 55.2 %
PLATELET # BLD AUTO: 264 K/UL (ref 135–450)
PMV BLD AUTO: 7.2 FL (ref 5–10.5)
POTASSIUM SERPL-SCNC: 4.2 MMOL/L (ref 3.5–5.1)
PROT SERPL-MCNC: 6.8 G/DL (ref 6.4–8.2)
RBC # BLD AUTO: 4.55 M/UL (ref 4.2–5.9)
SODIUM SERPL-SCNC: 139 MMOL/L (ref 136–145)
WBC # BLD AUTO: 4.6 K/UL (ref 4–11)

## 2025-05-06 PROCEDURE — 99212 OFFICE O/P EST SF 10 MIN: CPT

## 2025-05-06 PROCEDURE — 85025 COMPLETE CBC W/AUTO DIFF WBC: CPT

## 2025-05-06 PROCEDURE — 86140 C-REACTIVE PROTEIN: CPT

## 2025-05-06 PROCEDURE — 80053 COMPREHEN METABOLIC PANEL: CPT

## 2025-05-06 PROCEDURE — 85652 RBC SED RATE AUTOMATED: CPT

## 2025-05-06 PROCEDURE — 2500000003 HC RX 250 WO HCPCS: Performed by: INTERNAL MEDICINE

## 2025-05-06 RX ORDER — DIPHENHYDRAMINE HYDROCHLORIDE 50 MG/ML
50 INJECTION, SOLUTION INTRAMUSCULAR; INTRAVENOUS
OUTPATIENT
Start: 2025-05-07

## 2025-05-06 RX ORDER — ACETAMINOPHEN 325 MG/1
650 TABLET ORAL
OUTPATIENT
Start: 2025-05-07

## 2025-05-06 RX ORDER — SODIUM CHLORIDE 0.9 % (FLUSH) 0.9 %
5-40 SYRINGE (ML) INJECTION PRN
Status: DISCONTINUED | OUTPATIENT
Start: 2025-05-06 | End: 2025-05-07 | Stop reason: HOSPADM

## 2025-05-06 RX ORDER — SODIUM CHLORIDE 9 MG/ML
INJECTION, SOLUTION INTRAVENOUS CONTINUOUS
OUTPATIENT
Start: 2025-05-07

## 2025-05-06 RX ORDER — EPINEPHRINE 1 MG/ML
0.3 INJECTION, SOLUTION, CONCENTRATE INTRAVENOUS PRN
OUTPATIENT
Start: 2025-05-07

## 2025-05-06 RX ORDER — HYDROCORTISONE SODIUM SUCCINATE 100 MG/2ML
100 INJECTION INTRAMUSCULAR; INTRAVENOUS
OUTPATIENT
Start: 2025-05-07

## 2025-05-06 RX ORDER — ONDANSETRON 2 MG/ML
8 INJECTION INTRAMUSCULAR; INTRAVENOUS
OUTPATIENT
Start: 2025-05-07

## 2025-05-06 RX ORDER — ALBUTEROL SULFATE 90 UG/1
4 INHALANT RESPIRATORY (INHALATION) PRN
OUTPATIENT
Start: 2025-05-07

## 2025-05-06 RX ORDER — SODIUM CHLORIDE 0.9 % (FLUSH) 0.9 %
5-40 SYRINGE (ML) INJECTION PRN
Status: CANCELLED | OUTPATIENT
Start: 2025-05-07

## 2025-05-06 RX ADMIN — Medication 10 ML: at 08:45

## 2025-05-06 RX ADMIN — Medication 30 ML: at 08:23

## 2025-05-06 ASSESSMENT — PAIN SCALES - GENERAL: PAINLEVEL_OUTOF10: 0

## 2025-05-06 NOTE — DISCHARGE INSTRUCTIONS
Outpatient Infusion Discharge Instructions  [facility]  [address] [place], [place] [address]  Telephone: [phone]      FAX [phone]    NAME:  Reilly Alexander  YOB: 2000  MEDICAL RECORD NUMBER:  [id number]  DATE:  @ED@    Reason for Outpatient Infusion Visit: labs and dressing change    If you develop any these symptoms please contact you Doctor    [] Nausea and/or vomiting not relieved with medication   [] Swelling, redness, and/or bleeding at injection or IV site    [] Fever or chills  [] Rash or itching   [] Shortness of breath  [] Please review After Visit Summary (AVS) information on    [] Other      Outpatient Infusion Center Information: Should you experience any significant changes in your health or have questions about your care please contact the [facility] [place] at [phone] MONDAY - FRIDAY 8:00 am - 4:00 pm.  If you need help outside these hours and cannot wait until we are again available, contact your Primary Care Physician or go to the hospital emergency room.       Electronically signed by Sindhu Hollingsworth RN on 5/6/2025 at 8:51 AM

## 2025-05-06 NOTE — PROGRESS NOTES
Outpatient Infusion Center   [facility]    PICC Dressing Change    NAME:  Reilly Alexander  YOB: 2000  MEDICAL RECORD NUMBER:  [id number]  DATE:  5/6/2025    Patient arrived to Outpatient Infusion Center   [] per wheelchair   [x] ambulatory     PICC Location:right arm    PICC Site:  Redness: No  Bruising: No   Edema: No  Pain: No     PICC Cleansed:  Current dressing and stat lock removed: Yes  Chloroprep Scrub for 30 seconds and air dried completely for 1 minute: Yes     PICC Dressing Change  Skin barrier: Yes  Stat lock applied and PICC line secured Yes  Biopatch applied: Yes chg gel  PICC site covered with Tegaderm Yes  Date and initials applied to PICC dressing Yes  [x] Other: Pt came in with dressing change kit, told patient to keep kit incase he needs to reinforce dressing at home.  Patient states good idea incase something happens to dressing and needs to change at home.  Verbalized to patient not to change dressing at home, if something happens to dressing to reinforce with tape and call infusion center to come in for dressing change.  Pt states v/u.  Pt came in with extension tubing on picc line, extension tubing changed, primed with saline.     Next Dressing Due: May 13, 2025.     Response to treatment:  Well tolerated by patient.      Electronically signed by Sindhu Hollingsworth RN on 5/6/2025 at 8:56 AMOutpatient Infusion Center   [facility]    PICC Lab Draw    NAME:  Reilly Alexander  YOB: 2000  MEDICAL RECORD NUMBER:  [id number]  DATE:  5/6/2025    Patient arrived to Outpatient Infusion Center   [] per wheelchair   [x] ambulatory     PICC Location:right arm    PICC Site:  Redness: No  Bruising: No   Edema: No  Pain: No     PICC Labs  Cap cleansed with Chloroprep Scrub for 30 seconds and air dried completely for 1 minute on sterile 4X4: Yes  Blood drawn using a vaccutainer  Amount of blood wasted 10/ml syringe    Labs Obtained: Yes  Lab Test(s)

## 2025-05-07 ENCOUNTER — PHARMACY VISIT (OUTPATIENT)
Dept: PHARMACY | Age: 25
End: 2025-05-07

## 2025-05-07 ENCOUNTER — HOSPITAL ENCOUNTER (OUTPATIENT)
Dept: INFUSION THERAPY | Age: 25
Setting detail: INFUSION SERIES
End: 2025-05-07

## 2025-05-07 DIAGNOSIS — E10.10 DKA, TYPE 1, NOT AT GOAL (HCC): Primary | ICD-10-CM

## 2025-05-07 PROCEDURE — 99213 OFFICE O/P EST LOW 20 MIN: CPT | Performed by: SPEECH-LANGUAGE PATHOLOGIST

## 2025-05-07 RX ORDER — METFORMIN HYDROCHLORIDE 500 MG/1
500 TABLET, EXTENDED RELEASE ORAL
Qty: 30 TABLET | Refills: 0 | Status: SHIPPED | OUTPATIENT
Start: 2025-05-07

## 2025-05-07 NOTE — PATIENT INSTRUCTIONS
Patient Instructions:     Take metformin  mg daily with breakfast  Continue Lantus 40 units nightly  Continue Humalog 12 units three times daily before meals.     - Add 1 unit sliding scale for every 50 over 150   - Add 1 unit for every 15 grams of carb over 60  Use the log provided to write meals and insulin doses  Goal is about 45-60 grams of carb per meal, 15-20 for snacks.  Try to pair protein and fiber with carbs in your meals and snacks.  Check your blood sugar before each meal and at bedtime.  Call us if you are having blood sugars less than 70 mg/dl or greater than 300 mg/dl.      If you have a blood sugar less than 70 mg/dl, eat or drink 15 grams of carbohydrate (sugar) and recheck your blood sugar in 15 minutes.  Keep repeating this if necessary until your blood sugar is 70 mg/dl or above.

## 2025-05-07 NOTE — PROGRESS NOTES
Education     Patient's current eating and drinking patterns:   Number of meals per day: 3    Dietary sources / Eating pattern: Mostly at home  Reports reduced appetite since in the hospital.  Breakfast: bagel or eggs with goetta  Dinner: usually the biggest meal/eats out.    Likes to eat a lot of protein.    Fluid intake consists of: Water and zero sugar beverages.  Does not drink sugary beverages.   Celsius energy drinks.     Physical activity:   -  Used to work out frequently doing kickboxing and boxing, but this is limited due to current foot infection    Weight management:     Sleep:     Smoking Assessment:   - Continues to smoke occasionally, working on cutting back    Provided general diabetes education:   - Provided comprehensive diabetes education included but not limited to: managing high and low blood glucose readings, proper insulin administration (if applicable), carbohydrate goals, and diabetes medications    Materials given:  - Diabetes booklet provided to patient at first visit  - Other materials provided: Mealtime Log    Physician Follow-up for Diabetes: Yes He does not have a PCP, however I assisted him today with scheduling an appointment with Gunjan Paulino CNP - new PCP for 5/9/25    Plan     - Patient will continue current regimen of Lantus 40 units nightly and Humalog 12 units three times daily before meals.  - Add 1 unit sliding scale for every 50 over 150  - Add 1 unit for every 15 grams of carb over 60  - Start Metformin  mg daily with breakfast. In 2019 tests for Type I came back negative and still experiencing high sugars on Dexcom despite escalating dose. Patient reports trying Metformin in the past as well as stating his mom has been well controlled on Metformin/Farxiga.  - Put in orders (ANGEL, C-peptide, Zinc, and insulin antibodies) to recheck Type I status.  - Patient will use Dexcom to check blood sugars before meals and at bedtime (has glucometer to use as a back up)  - Patient

## 2025-05-08 ENCOUNTER — HOSPITAL ENCOUNTER (OUTPATIENT)
Dept: WOUND CARE | Age: 25
Discharge: HOME | End: 2025-05-08
Attending: NURSE PRACTITIONER

## 2025-05-08 VITALS
RESPIRATION RATE: 16 BRPM | HEART RATE: 109 BPM | DIASTOLIC BLOOD PRESSURE: 76 MMHG | SYSTOLIC BLOOD PRESSURE: 109 MMHG | TEMPERATURE: 98.3 F

## 2025-05-08 DIAGNOSIS — E11.621 DIABETIC ULCER OF RIGHT MIDFOOT ASSOCIATED WITH TYPE 2 DIABETES MELLITUS, WITH NECROSIS OF MUSCLE (HCC): Primary | ICD-10-CM

## 2025-05-08 DIAGNOSIS — M79.89 LEG SWELLING: ICD-10-CM

## 2025-05-08 DIAGNOSIS — L97.413 DIABETIC ULCER OF RIGHT MIDFOOT ASSOCIATED WITH TYPE 2 DIABETES MELLITUS, WITH NECROSIS OF MUSCLE (HCC): Primary | ICD-10-CM

## 2025-05-08 PROCEDURE — 11046 DBRDMT MUSC&/FSCA EA ADDL: CPT | Performed by: NURSE PRACTITIONER

## 2025-05-08 PROCEDURE — 11043 DBRDMT MUSC&/FSCA 1ST 20/<: CPT

## 2025-05-08 PROCEDURE — 11046 DBRDMT MUSC&/FSCA EA ADDL: CPT

## 2025-05-08 PROCEDURE — 11043 DBRDMT MUSC&/FSCA 1ST 20/<: CPT | Performed by: NURSE PRACTITIONER

## 2025-05-08 RX ORDER — LIDOCAINE HYDROCHLORIDE 20 MG/ML
JELLY TOPICAL PRN
OUTPATIENT
Start: 2025-05-08

## 2025-05-08 RX ORDER — CLOBETASOL PROPIONATE 0.5 MG/G
OINTMENT TOPICAL PRN
OUTPATIENT
Start: 2025-05-08

## 2025-05-08 RX ORDER — MUPIROCIN 20 MG/G
OINTMENT TOPICAL PRN
OUTPATIENT
Start: 2025-05-08

## 2025-05-08 RX ORDER — NEOMYCIN/BACITRACIN/POLYMYXINB 3.5-400-5K
OINTMENT (GRAM) TOPICAL PRN
OUTPATIENT
Start: 2025-05-08

## 2025-05-08 RX ORDER — SODIUM CHLOR/HYPOCHLOROUS ACID 0.033 %
SOLUTION, IRRIGATION IRRIGATION PRN
OUTPATIENT
Start: 2025-05-08

## 2025-05-08 RX ORDER — GINSENG 100 MG
CAPSULE ORAL PRN
OUTPATIENT
Start: 2025-05-08

## 2025-05-08 RX ORDER — LIDOCAINE HYDROCHLORIDE 40 MG/ML
SOLUTION TOPICAL PRN
OUTPATIENT
Start: 2025-05-08

## 2025-05-08 RX ORDER — BACITRACIN ZINC AND POLYMYXIN B SULFATE 500; 1000 [USP'U]/G; [USP'U]/G
OINTMENT TOPICAL PRN
OUTPATIENT
Start: 2025-05-08

## 2025-05-08 RX ORDER — TRIAMCINOLONE ACETONIDE 1 MG/G
OINTMENT TOPICAL PRN
OUTPATIENT
Start: 2025-05-08

## 2025-05-08 RX ORDER — LIDOCAINE 40 MG/G
CREAM TOPICAL PRN
OUTPATIENT
Start: 2025-05-08

## 2025-05-08 RX ORDER — LIDOCAINE HYDROCHLORIDE 40 MG/ML
SOLUTION TOPICAL PRN
Status: DISCONTINUED | OUTPATIENT
Start: 2025-05-08 | End: 2025-05-09 | Stop reason: HOSPADM

## 2025-05-08 RX ORDER — BETAMETHASONE DIPROPIONATE 0.5 MG/G
CREAM TOPICAL PRN
OUTPATIENT
Start: 2025-05-08

## 2025-05-08 RX ORDER — LIDOCAINE 50 MG/G
OINTMENT TOPICAL PRN
OUTPATIENT
Start: 2025-05-08

## 2025-05-08 RX ORDER — GENTAMICIN SULFATE 1 MG/G
OINTMENT TOPICAL PRN
OUTPATIENT
Start: 2025-05-08

## 2025-05-08 RX ADMIN — LIDOCAINE HYDROCHLORIDE: 40 SOLUTION TOPICAL at 08:42

## 2025-05-08 ASSESSMENT — PAIN SCALES - GENERAL: PAINLEVEL_OUTOF10: 0

## 2025-05-08 NOTE — ASSESSMENT & PLAN NOTE
Chronic, not at goal (unstable), continue current treatment plan  - Continue following with infusion center and infectious disease- Dr. Hill.

## 2025-05-08 NOTE — ASSESSMENT & PLAN NOTE
Chronic, not at goal (unstable), continue current treatment plan, medication adherence emphasized, and lifestyle modifications recommended  - Continue metformin  mg daily, Lantus 40 nightly, Humalog 12 units three times daily before meals. Sliding scale as provided by pharmacy visit.   - Continue to check blood sugars with meals and at bedtime.   - Alert the office for blood sugars below 70 or greater than 300.   - Reviewed diabetic diet.   Orders:    Royal Franco MD, Endocrinology, [facility]    ANTI-ISLET CELL ANTIBODY

## 2025-05-08 NOTE — PLAN OF CARE
Patient's BP was 103/81 at the clinic and  at home this morning. He stated he wasn't feeling well and was pallor.He was given costa crackers with peanut butter and cranberry juice. Repeat BP was 97/63 at 9:05 am then 10 minutes later BP was 109/76 Blood sugar was 159. He states he took insulin and started a new medication glucophage 500 mg and had no breakfast. Repeat blood sugar was then 109 within 5 minutes later. Another 5 minutes later blood sugar dropped to 100 then 98 X2 5-10 minutes later. This was reported to the Nurse Practitioner Ryan Brown and she asked the patient to call his mother who was down the street doing orientation for work. The patient was kept in our care at the [facility] care center until he was more stable. After another 5 minutes repeat blood sugar was 102. The patient waited another 10 minutes and repeat blood sugar was then 136. Patient was then discharged from the clinic and was told to eat breakfast. He was also told to eat when he takes his new diabetic medication and patient verbalized understanding of these instructions.

## 2025-05-08 NOTE — PROGRESS NOTES
[facility]   Progress Note and Procedure Note      Reilly Alexander  MEDICAL RECORD NUMBER:  [id number]  AGE: 24 y.o.   GENDER: male  : 2000  EPISODE DATE:  2025    Subjective:     Chief Complaint   Patient presents with    Wound Check     Follow-up visit for wounds to the right foot.          HISTORY of PRESENT ILLNESS HPI     Reilly Alexander is a 24 y.o. male who presents today for wound/ulcer evaluation.   History of Wound Context: Diabetic foot ulcer of right lateral foot.  Patient presented to the hospital on 25 with DKA and admitted to ICU.  A1C was 11.6.  Status post I&D on ,  and  by Dr. Bourne; exposed tendon has been surgically debrided.  Patient is still receiving IV Invanz via a right basilic PICC per infusion center/home health.    25:  Patient started metformin today, along with his insulin.  He had not eaten breakfast.  Upon arrival, BP was 103/81 and .  He wasn't feeling well post-debridement.  Was given costa crackers.  Repeat BP 97/63, .  Was given additional costa crackers with peanut butter and cranberry juice.  Repeat FBS was 109, then 5 minutes later, dropped to 100, then 98.  Another 5 minutes, repeat FBS was 102.  After 10 minutes, FBS was 136.  Wound has improved since last week, less tendon exposure.      Wound/Ulcer Pain Timing/Severity: intermittent  Quality of pain: aching  Severity:  2 / 10   Modifying Factors:  dressing changes  Associated Signs/Symptoms: edema, drainage and pain    Ulcer Identification:  Ulcer Type: diabetic    Contributing Factors: edema, diabetes, and poor glucose control    Acute Wound: Other: Diabetic foot ulcer with multiple surgical I&D's    PAST MEDICAL HISTORY        Diagnosis Date    Diabetes (HCC)     type 1    Prehypertension        PAST SURGICAL HISTORY    Past Surgical History:   Procedure Laterality Date    FOOT DEBRIDEMENT Right 2025    INCISION AND DRAINAGE RIGHT FOOT

## 2025-05-08 NOTE — PROGRESS NOTES
Reilly Alexander (:  2000) is a 24 y.o. male,Established patient, here for evaluation of the following chief complaint(s): New to provider.    Reilly is a 24 year old male with past medical history of T1DM with poor control, recurrent DKA, chronic osteomyelitis, and tobacco abuse.      He presents to the office today to establish care with a new provider for chronic care management.     T1DM- He has had type one testing in the past but, reports a negative result. He is not established with endo at this time. There was a gap in care.  Following with pharmacy for medication management. Current regime is: Metformin  mg daily with breakfast,  Lantus 40 units nightly and Humalog 12 units three times daily before meals.  Add 1 unit sliding scale for every 50 over 150. Add 1 unit for every 15 grams of carb over 60. Last A1C was 11.6.     Chronic osteomyelitis of right foot- being managed by Dr. Hill (ID) through infusion center/ [facility] with home IV ABX. He is receiving Ertapenem daily through 25 as well as scheduled wound care visits. He has had I&D on , , and  with Dr. Bourne. He is not weight bearing to the right foot.     Family History   Problem Relation Age of Onset    Hypertension Mother     Diabetes Mother     Cancer Father         Thyroid    Diabetes Father     No Known Problems Brother     No Known Problems Brother      Social History     Socioeconomic History    Marital status: Single     Spouse name: Not on file    Number of children: 0    Years of education: Not on file    Highest education level: Not on file   Occupational History    Occupation: Enlisting in [employer]   Tobacco Use    Smoking status: Former     Current packs/day: 0.00     Types: Cigarettes     Quit date: 2015     Years since quittin.8    Smokeless tobacco: Current    Tobacco comments:     Vapes; 0% nicotine   Vaping Use    Vaping status: Every Day   Substance and Sexual Activity    Alcohol use: Not

## 2025-05-08 NOTE — PATIENT INSTRUCTIONS
[facility] Wound Care Physician Orders and Discharge Instructions  [address], [address]        [place], [place] [address]  Telephone: [phone]      FAX [phone]  MONDAY - THURSDAY 8:30 am - 4:30 pm and Friday 8:30 am - 11:30 am      NAME:  Reilly Alexander  YOB: 2000  MEDICAL RECORD NUMBER:  [id number]  DATE:  5/8/2025      Return Appointment:  [x] Return Appointment: With Ryan Brown CNP  in  1  Week(s)             [] Nurse Visit for Wound Assessment:  [] DME/Wound Dressing Supplies Provided by: Other n/a     Please call them directly to reorder supplies when you run out)  [] ECF or Home Healthcare:  Other: n/a  Your Home Care Agency is responsible to order your supplies.   [] Orders placed during your visit:      **ANTIBIOTICS PER DR MURRELL**       Important Reminders:   Please wash hands with soap and water before and after every dressing change.  If you smoke we ask that you refrain from smoking. Smoking inhibits wounds from healing.  When taking antibiotics take the entire prescription as ordered. Do not stop taking until medication is all gone unless otherwise instructed.   Do not get wounds wet in the bath or shower unless otherwise instructed by your physician. If your wound is on your foot or leg, you may purchase a cast bag. Please ask at your local pharmacy.   IF YOU ARE UNABLE TO OBTAIN WOUND SUPPLIES, CONTINUE TO USE THE SUPPLIES YOU HAVE AVAILABLE UNTIL YOU ARE ABLE TO REACH US. IT IS MOST IMPORTANT TO KEEP THE WOUND COVERED AT ALL TIMES.  ___________________________________________________________________    Wound Location: Right  Foot    WOUND CLEANSING:Do NOT Scrub Wound   Normal Saline    Do not get dressing or wound wet in shower.    Grace-Wound Topical Treatments:   Apply immediately around the wound: Apply SKIN PREP to skin immediately around wound.    PRIMARY DRESSING: XEROFORM      SECONDARY DRESSING: 4X4 non woven gauze pad and Kerlix/Rolled

## 2025-05-09 ENCOUNTER — OFFICE VISIT (OUTPATIENT)
Dept: INTERNAL MEDICINE CLINIC | Age: 25
End: 2025-05-09

## 2025-05-09 VITALS
HEART RATE: 107 BPM | SYSTOLIC BLOOD PRESSURE: 116 MMHG | DIASTOLIC BLOOD PRESSURE: 82 MMHG | BODY MASS INDEX: 27.07 KG/M2 | WEIGHT: 210.8 LBS | OXYGEN SATURATION: 98 %

## 2025-05-09 DIAGNOSIS — L97.509 DIABETIC FOOT ULCER WITH OSTEOMYELITIS (HCC): ICD-10-CM

## 2025-05-09 DIAGNOSIS — E10.65 POOR CONTROL TYPE I DIABETES MELLITUS (HCC): Primary | ICD-10-CM

## 2025-05-09 DIAGNOSIS — M86.9 DIABETIC FOOT ULCER WITH OSTEOMYELITIS (HCC): ICD-10-CM

## 2025-05-09 DIAGNOSIS — E11.69 DIABETIC FOOT ULCER WITH OSTEOMYELITIS (HCC): ICD-10-CM

## 2025-05-09 DIAGNOSIS — E11.621 DIABETIC FOOT ULCER WITH OSTEOMYELITIS (HCC): ICD-10-CM

## 2025-05-09 DIAGNOSIS — Z00.01 ENCOUNTER FOR WELL ADULT EXAM WITH ABNORMAL FINDINGS: ICD-10-CM

## 2025-05-09 LAB
ALBUMIN SERPL-MCNC: 4 G/DL (ref 3.4–5)
ALBUMIN/GLOB SERPL: 1.6 {RATIO} (ref 1.1–2.2)
ALP SERPL-CCNC: 81 U/L (ref 40–129)
ALT SERPL-CCNC: 33 U/L (ref 10–40)
ANION GAP SERPL CALCULATED.3IONS-SCNC: 9 MMOL/L (ref 3–16)
AST SERPL-CCNC: 17 U/L (ref 15–37)
BASOPHILS # BLD: 0 K/UL (ref 0–0.2)
BASOPHILS NFR BLD: 0.6 %
BILIRUB SERPL-MCNC: 0.5 MG/DL (ref 0–1)
BUN SERPL-MCNC: 16 MG/DL (ref 7–20)
CALCIUM SERPL-MCNC: 9.5 MG/DL (ref 8.3–10.6)
CHLORIDE SERPL-SCNC: 103 MMOL/L (ref 99–110)
CHOLEST SERPL-MCNC: 180 MG/DL (ref 0–199)
CO2 SERPL-SCNC: 27 MMOL/L (ref 21–32)
CREAT SERPL-MCNC: 0.4 MG/DL (ref 0.9–1.3)
CREAT UR-MCNC: 88.3 MG/DL (ref 39–259)
DEPRECATED RDW RBC AUTO: 12.8 % (ref 12.4–15.4)
EOSINOPHIL # BLD: 0.1 K/UL (ref 0–0.6)
EOSINOPHIL NFR BLD: 1.7 %
GFR SERPLBLD CREATININE-BSD FMLA CKD-EPI: >90 ML/MIN/{1.73_M2}
GLUCOSE P FAST SERPL-MCNC: 143 MG/DL (ref 70–99)
HCT VFR BLD AUTO: 41.3 % (ref 40.5–52.5)
HCV AB SERPL QL IA: NORMAL
HDLC SERPL-MCNC: 73 MG/DL (ref 40–60)
HGB BLD-MCNC: 14.2 G/DL (ref 13.5–17.5)
LDLC SERPL CALC-MCNC: 91 MG/DL
LYMPHOCYTES # BLD: 1.5 K/UL (ref 1–5.1)
LYMPHOCYTES NFR BLD: 34.2 %
MCH RBC QN AUTO: 30 PG (ref 26–34)
MCHC RBC AUTO-ENTMCNC: 34.3 G/DL (ref 31–36)
MCV RBC AUTO: 87.5 FL (ref 80–100)
MICROALBUMIN UR DL<=1MG/L-MCNC: <1.2 MG/DL
MICROALBUMIN/CREAT UR: NORMAL MG/G (ref 0–30)
MONOCYTES # BLD: 0.4 K/UL (ref 0–1.3)
MONOCYTES NFR BLD: 9.1 %
NEUTROPHILS # BLD: 2.5 K/UL (ref 1.7–7.7)
NEUTROPHILS NFR BLD: 54.4 %
PLATELET # BLD AUTO: 253 K/UL (ref 135–450)
PMV BLD AUTO: 7.6 FL (ref 5–10.5)
POTASSIUM SERPL-SCNC: 4.6 MMOL/L (ref 3.5–5.1)
PROT SERPL-MCNC: 6.5 G/DL (ref 6.4–8.2)
RBC # BLD AUTO: 4.72 M/UL (ref 4.2–5.9)
SODIUM SERPL-SCNC: 139 MMOL/L (ref 136–145)
TRIGL SERPL-MCNC: 81 MG/DL (ref 0–150)
VLDLC SERPL CALC-MCNC: 16 MG/DL
WBC # BLD AUTO: 4.5 K/UL (ref 4–11)

## 2025-05-09 ASSESSMENT — ENCOUNTER SYMPTOMS
CONSTIPATION: 0
VOMITING: 0
ABDOMINAL PAIN: 0
PHOTOPHOBIA: 0
COUGH: 0
COLOR CHANGE: 0
SHORTNESS OF BREATH: 0
TROUBLE SWALLOWING: 0
NAUSEA: 0
VOICE CHANGE: 0
DIARRHEA: 1

## 2025-05-09 ASSESSMENT — PATIENT HEALTH QUESTIONNAIRE - PHQ9
SUM OF ALL RESPONSES TO PHQ QUESTIONS 1-9: 0
SUM OF ALL RESPONSES TO PHQ QUESTIONS 1-9: 0
1. LITTLE INTEREST OR PLEASURE IN DOING THINGS: NOT AT ALL
2. FEELING DOWN, DEPRESSED OR HOPELESS: NOT AT ALL
SUM OF ALL RESPONSES TO PHQ QUESTIONS 1-9: 0
SUM OF ALL RESPONSES TO PHQ QUESTIONS 1-9: 0

## 2025-05-10 LAB
HIV 1+2 AB+HIV1 P24 AG SERPL QL IA: NORMAL
HIV 2 AB SERPL QL IA: NORMAL
HIV1 AB SERPL QL IA: NORMAL
HIV1 P24 AG SERPL QL IA: NORMAL

## 2025-05-12 ENCOUNTER — TELEPHONE (OUTPATIENT)
Dept: INFECTIOUS DISEASES | Age: 25
End: 2025-05-12

## 2025-05-12 ENCOUNTER — RESULTS FOLLOW-UP (OUTPATIENT)
Dept: INTERNAL MEDICINE CLINIC | Age: 25
End: 2025-05-12

## 2025-05-12 NOTE — TELEPHONE ENCOUNTER
OPAT Nurse Coordinator Weekly Update Note    Current OPAT plan:  Ertapenem 1 gm iv daily through 6/4/25 - [facility] [place]    Diagnosis:  R DM foot infection / osteomyelitis     Assessment:  spoke with pt and hs states his is doing well and tolerating IV ATB without adverse SE.  Pt states that in the last two weeks, foot wound has closed significantly.  Pt comes to the [facility] weekly for labs and dressing changes to PICC.    Follow up appts:  Dr Brown 5/13

## 2025-05-13 ENCOUNTER — HOSPITAL ENCOUNTER (OUTPATIENT)
Dept: INFUSION THERAPY | Age: 25
Setting detail: INFUSION SERIES
Discharge: HOME | End: 2025-05-13

## 2025-05-13 DIAGNOSIS — L97.509 DIABETIC FOOT ULCER WITH OSTEOMYELITIS (HCC): Primary | ICD-10-CM

## 2025-05-13 DIAGNOSIS — E11.69 DIABETIC FOOT ULCER WITH OSTEOMYELITIS (HCC): Primary | ICD-10-CM

## 2025-05-13 DIAGNOSIS — E11.621 DIABETIC FOOT ULCER WITH OSTEOMYELITIS (HCC): Primary | ICD-10-CM

## 2025-05-13 DIAGNOSIS — M86.9 DIABETIC FOOT ULCER WITH OSTEOMYELITIS (HCC): Primary | ICD-10-CM

## 2025-05-13 LAB
ALBUMIN SERPL-MCNC: 4 G/DL (ref 3.4–5)
ALBUMIN/GLOB SERPL: 1.5 {RATIO} (ref 1.1–2.2)
ALP SERPL-CCNC: 74 U/L (ref 40–129)
ALT SERPL-CCNC: 37 U/L (ref 10–40)
ANION GAP SERPL CALCULATED.3IONS-SCNC: 13 MMOL/L (ref 3–16)
AST SERPL-CCNC: 22 U/L (ref 15–37)
BASOPHILS # BLD: 0 K/UL (ref 0–0.2)
BASOPHILS NFR BLD: 0.8 %
BILIRUB SERPL-MCNC: 0.8 MG/DL (ref 0–1)
BUN SERPL-MCNC: 16 MG/DL (ref 7–20)
CALCIUM SERPL-MCNC: 9.2 MG/DL (ref 8.3–10.6)
CHLORIDE SERPL-SCNC: 104 MMOL/L (ref 99–110)
CO2 SERPL-SCNC: 25 MMOL/L (ref 21–32)
CREAT SERPL-MCNC: 0.4 MG/DL (ref 0.9–1.3)
CRP SERPL-MCNC: <3 MG/L (ref 0–5.1)
DEPRECATED RDW RBC AUTO: 12.8 % (ref 12.4–15.4)
EOSINOPHIL # BLD: 0.1 K/UL (ref 0–0.6)
EOSINOPHIL NFR BLD: 2 %
ERYTHROCYTE [SEDIMENTATION RATE] IN BLOOD BY WESTERGREN METHOD: 12 MM/HR (ref 0–15)
GFR SERPLBLD CREATININE-BSD FMLA CKD-EPI: >90 ML/MIN/{1.73_M2}
GLUCOSE SERPL-MCNC: 122 MG/DL (ref 70–99)
HCT VFR BLD AUTO: 40.5 % (ref 40.5–52.5)
HGB BLD-MCNC: 13.7 G/DL (ref 13.5–17.5)
LYMPHOCYTES # BLD: 1.5 K/UL (ref 1–5.1)
LYMPHOCYTES NFR BLD: 33.1 %
MCH RBC QN AUTO: 29.7 PG (ref 26–34)
MCHC RBC AUTO-ENTMCNC: 33.9 G/DL (ref 31–36)
MCV RBC AUTO: 87.4 FL (ref 80–100)
MONOCYTES # BLD: 0.4 K/UL (ref 0–1.3)
MONOCYTES NFR BLD: 9.1 %
NEUTROPHILS # BLD: 2.5 K/UL (ref 1.7–7.7)
NEUTROPHILS NFR BLD: 55 %
PANC ISLET CELL AB TITR SER: NORMAL {TITER}
PLATELET # BLD AUTO: 238 K/UL (ref 135–450)
PMV BLD AUTO: 7.5 FL (ref 5–10.5)
POTASSIUM SERPL-SCNC: 4.2 MMOL/L (ref 3.5–5.1)
PROT SERPL-MCNC: 6.7 G/DL (ref 6.4–8.2)
RBC # BLD AUTO: 4.64 M/UL (ref 4.2–5.9)
SODIUM SERPL-SCNC: 142 MMOL/L (ref 136–145)
WBC # BLD AUTO: 4.5 K/UL (ref 4–11)

## 2025-05-13 PROCEDURE — 85025 COMPLETE CBC W/AUTO DIFF WBC: CPT

## 2025-05-13 PROCEDURE — 86140 C-REACTIVE PROTEIN: CPT

## 2025-05-13 PROCEDURE — 99212 OFFICE O/P EST SF 10 MIN: CPT

## 2025-05-13 PROCEDURE — 2500000003 HC RX 250 WO HCPCS: Performed by: INTERNAL MEDICINE

## 2025-05-13 PROCEDURE — 85652 RBC SED RATE AUTOMATED: CPT

## 2025-05-13 PROCEDURE — 80053 COMPREHEN METABOLIC PANEL: CPT

## 2025-05-13 RX ORDER — ALBUTEROL SULFATE 90 UG/1
4 INHALANT RESPIRATORY (INHALATION) PRN
Status: CANCELLED | OUTPATIENT
Start: 2025-05-14

## 2025-05-13 RX ORDER — ONDANSETRON 2 MG/ML
8 INJECTION INTRAMUSCULAR; INTRAVENOUS
Status: CANCELLED | OUTPATIENT
Start: 2025-05-14

## 2025-05-13 RX ORDER — HYDROCORTISONE SODIUM SUCCINATE 100 MG/2ML
100 INJECTION INTRAMUSCULAR; INTRAVENOUS
Status: CANCELLED | OUTPATIENT
Start: 2025-05-14

## 2025-05-13 RX ORDER — SODIUM CHLORIDE 0.9 % (FLUSH) 0.9 %
5-40 SYRINGE (ML) INJECTION PRN
Status: CANCELLED | OUTPATIENT
Start: 2025-05-14

## 2025-05-13 RX ORDER — SODIUM CHLORIDE 0.9 % (FLUSH) 0.9 %
5-40 SYRINGE (ML) INJECTION PRN
Status: DISCONTINUED | OUTPATIENT
Start: 2025-05-13 | End: 2025-05-14 | Stop reason: HOSPADM

## 2025-05-13 RX ORDER — ACETAMINOPHEN 325 MG/1
650 TABLET ORAL
Status: CANCELLED | OUTPATIENT
Start: 2025-05-14

## 2025-05-13 RX ORDER — DIPHENHYDRAMINE HYDROCHLORIDE 50 MG/ML
50 INJECTION, SOLUTION INTRAMUSCULAR; INTRAVENOUS
Status: CANCELLED | OUTPATIENT
Start: 2025-05-14

## 2025-05-13 RX ORDER — SODIUM CHLORIDE 9 MG/ML
INJECTION, SOLUTION INTRAVENOUS CONTINUOUS
Status: CANCELLED | OUTPATIENT
Start: 2025-05-14

## 2025-05-13 RX ORDER — EPINEPHRINE 1 MG/ML
0.3 INJECTION, SOLUTION, CONCENTRATE INTRAVENOUS PRN
Status: CANCELLED | OUTPATIENT
Start: 2025-05-14

## 2025-05-13 RX ADMIN — Medication 40 ML: at 08:50

## 2025-05-13 NOTE — PROGRESS NOTES
Outpatient Infusion Center   [facility]    PICC Lab Draw    NAME:  Reilly Alexander  YOB: 2000  MEDICAL RECORD NUMBER:  [id number]  DATE:  5/13/2025    Patient arrived to Outpatient Infusion Center   [] per wheelchair   [x] ambulatory     Patient here for PICC Labs and PICC dressing change. Patient is being treated with daily IV Invanz for osteomyelitis of right foot. He is using a knee scooter to keep weight of of right foot.     PICC Location:right arm    PICC Site:  Redness: No  Bruising: No   Edema: No  Pain: No     PICC Labs  Cap cleansed with Chloroprep Scrub for 30 seconds and air dried completely for 1 minute on sterile 4X4: Yes  Blood drawn using a 10/ml syringe  Amount of blood wasted 10/ml syringe    Labs Obtained: Yes  Lab Test(s) Ordered: CBC, CMP, CRP. ESR  PICC Flushed with 40ml/NS: Yes  New Cap applied: Yes     Response to treatment:  Well tolerated by patient.      Electronically signed by Nu Mo RN on 5/13/2025 at 2:57 PM      Outpatient Infusion Center   [facility]    PICC Dressing Change    NAME:  Reilly Alexander  YOB: 2000  MEDICAL RECORD NUMBER:  [id number]  DATE:  5/13/2025    Patient arrived to Outpatient Infusion Center   [] per wheelchair   [x] ambulatory using knee scooter.    PICC Location:right arm    PICC Site:  Redness: No  Bruising: No   Edema: No  Pain: No     PICC Cleansed:  Current dressing and stat lock removed: Yes  Chloroprep Scrub for 30 seconds and air dried completely for 1 minute: Yes     PICC Dressing Change  Skin barrier: Yes  Stat lock applied and PICC line secured Yes    PICC site covered with Tegaderm with CHG gel  Yes  Date and initials applied to PICC dressing Yes  [] Other:    Next Dressing Due: May 20, 2025.     Response to treatment:  Well tolerated by patient.      Electronically signed by Nu Mo RN on 5/13/2025 at 3:04 PM    Clinic Level of Care Assessment  Infusion Center      NAME:   Reilly Alexander  YOB: 2000 GENDER: male  MEDICAL RECORD NUMBER:  [id number]   DATE:  5/13/2025     Ambulation Status Document in Daily Care/Safety Tab   Status Definition Points   Independent Independently able to ambulate.  Fully able (without any assistance) to get on/off exam table/chair.  [x]   0   Minimal Physical Assistance Requires physical assistance of one person to ambulate and/or position patient to be examined. Includes necessary physical assistance to position lower extremities on/off stool. []   1   Moderate Physical Assistance Requires at least one staff member to physically assist patient in ambulating into treatment room, and/or on off chair/bed.  Requires assistance to bathroom. []   2   Full Assistance Requires assistance of at least two staff members to transfer patient into treatment room and/or on/off bed/chair. \"Total Transfer\".  Unable to use bathroom requires bedside commode and/or bedpan []   3       Dressing Complexity Document in LDA Navigator  Complexity Definition Points   No Dressing  []   0   Simple Minimal, simple dressing. i.e. bandaid, gauze, simple wrap.  Peripheral IV dressing. []   1   Intermediate Moderately complicated PICC dressing change requiring sterile procedure and site assessment. [x]   2   Complex Complicated dressing change port access requiring sterile procedure and site assessment.  []   3       Teaching Effort Document in AVS and/or Education Navigator    Effort Definition Points   No Teaching  [x]   0   Simple Teach two or less topics.  Teaching documented in discharge instructions in AVS and copy given to patient. []   1   Intermediate Teach three to four topics.  Teaching documented in Discharge Instructions in AVS using References and Attachments. Copy given to patient.   []   2   Complex Teach five to six topics. Teaching documented in Discharge Instructions in AVS using References and Attachments. May also be documentation in Education

## 2025-05-15 ENCOUNTER — HOSPITAL ENCOUNTER (OUTPATIENT)
Dept: WOUND CARE | Age: 25
Discharge: HOME | End: 2025-05-15
Attending: NURSE PRACTITIONER

## 2025-05-15 ENCOUNTER — TELEPHONE (OUTPATIENT)
Dept: INTERNAL MEDICINE CLINIC | Age: 25
End: 2025-05-15

## 2025-05-15 VITALS
TEMPERATURE: 97 F | HEART RATE: 114 BPM | RESPIRATION RATE: 16 BRPM | SYSTOLIC BLOOD PRESSURE: 116 MMHG | DIASTOLIC BLOOD PRESSURE: 76 MMHG

## 2025-05-15 DIAGNOSIS — L97.509 DIABETIC FOOT ULCER WITH OSTEOMYELITIS (HCC): Primary | ICD-10-CM

## 2025-05-15 DIAGNOSIS — E11.69 DIABETIC FOOT ULCER WITH OSTEOMYELITIS (HCC): Primary | ICD-10-CM

## 2025-05-15 DIAGNOSIS — E11.621 DIABETIC ULCER OF RIGHT MIDFOOT ASSOCIATED WITH TYPE 2 DIABETES MELLITUS, WITH NECROSIS OF MUSCLE (HCC): Primary | ICD-10-CM

## 2025-05-15 DIAGNOSIS — L97.413 DIABETIC ULCER OF RIGHT MIDFOOT ASSOCIATED WITH TYPE 2 DIABETES MELLITUS, WITH NECROSIS OF MUSCLE (HCC): Primary | ICD-10-CM

## 2025-05-15 DIAGNOSIS — M86.9 DIABETIC FOOT ULCER WITH OSTEOMYELITIS (HCC): Primary | ICD-10-CM

## 2025-05-15 DIAGNOSIS — M79.89 LEG SWELLING: ICD-10-CM

## 2025-05-15 DIAGNOSIS — E11.621 DIABETIC FOOT ULCER WITH OSTEOMYELITIS (HCC): Primary | ICD-10-CM

## 2025-05-15 PROCEDURE — 11043 DBRDMT MUSC&/FSCA 1ST 20/<: CPT

## 2025-05-15 PROCEDURE — 11046 DBRDMT MUSC&/FSCA EA ADDL: CPT | Performed by: NURSE PRACTITIONER

## 2025-05-15 PROCEDURE — 11043 DBRDMT MUSC&/FSCA 1ST 20/<: CPT | Performed by: NURSE PRACTITIONER

## 2025-05-15 PROCEDURE — 11046 DBRDMT MUSC&/FSCA EA ADDL: CPT

## 2025-05-15 RX ORDER — NEOMYCIN/BACITRACIN/POLYMYXINB 3.5-400-5K
OINTMENT (GRAM) TOPICAL PRN
OUTPATIENT
Start: 2025-05-15

## 2025-05-15 RX ORDER — LIDOCAINE HYDROCHLORIDE 20 MG/ML
JELLY TOPICAL PRN
OUTPATIENT
Start: 2025-05-15

## 2025-05-15 RX ORDER — LIDOCAINE 40 MG/G
CREAM TOPICAL PRN
OUTPATIENT
Start: 2025-05-15

## 2025-05-15 RX ORDER — BACITRACIN ZINC AND POLYMYXIN B SULFATE 500; 1000 [USP'U]/G; [USP'U]/G
OINTMENT TOPICAL PRN
OUTPATIENT
Start: 2025-05-15

## 2025-05-15 RX ORDER — TRIAMCINOLONE ACETONIDE 1 MG/G
OINTMENT TOPICAL PRN
OUTPATIENT
Start: 2025-05-15

## 2025-05-15 RX ORDER — LIDOCAINE HYDROCHLORIDE 40 MG/ML
SOLUTION TOPICAL PRN
Status: DISCONTINUED | OUTPATIENT
Start: 2025-05-15 | End: 2025-05-16 | Stop reason: HOSPADM

## 2025-05-15 RX ORDER — CLOBETASOL PROPIONATE 0.5 MG/G
OINTMENT TOPICAL PRN
OUTPATIENT
Start: 2025-05-15

## 2025-05-15 RX ORDER — GENTAMICIN SULFATE 1 MG/G
OINTMENT TOPICAL PRN
OUTPATIENT
Start: 2025-05-15

## 2025-05-15 RX ORDER — ACYCLOVIR 400 MG/1
TABLET ORAL
Qty: 9 EACH | Refills: 2 | Status: SHIPPED | OUTPATIENT
Start: 2025-05-15

## 2025-05-15 RX ORDER — LIDOCAINE 50 MG/G
OINTMENT TOPICAL PRN
OUTPATIENT
Start: 2025-05-15

## 2025-05-15 RX ORDER — MUPIROCIN 20 MG/G
OINTMENT TOPICAL PRN
OUTPATIENT
Start: 2025-05-15

## 2025-05-15 RX ORDER — LIDOCAINE HYDROCHLORIDE 40 MG/ML
SOLUTION TOPICAL PRN
OUTPATIENT
Start: 2025-05-15

## 2025-05-15 RX ORDER — BETAMETHASONE DIPROPIONATE 0.5 MG/G
CREAM TOPICAL PRN
OUTPATIENT
Start: 2025-05-15

## 2025-05-15 RX ORDER — SODIUM CHLOR/HYPOCHLOROUS ACID 0.033 %
SOLUTION, IRRIGATION IRRIGATION PRN
OUTPATIENT
Start: 2025-05-15

## 2025-05-15 RX ORDER — GINSENG 100 MG
CAPSULE ORAL PRN
OUTPATIENT
Start: 2025-05-15

## 2025-05-15 RX ADMIN — LIDOCAINE HYDROCHLORIDE: 40 SOLUTION TOPICAL at 08:29

## 2025-05-15 NOTE — TELEPHONE ENCOUNTER
Pt is requesting an Rx for Continuous Glucose Sensor (DEXCOM G7 SENSOR) [facility]    Thank you

## 2025-05-15 NOTE — PROGRESS NOTES
immediate medical care if:    You have symptoms of infection, such as:  Increased pain, swelling, warmth, or redness.  Red streaks leading from the area.  Pus draining from the area.  A fever.     _______________________________________________________________________________________________    : CITLALLI      Electronically signed by : Citlalli BELTRE on 5/15/2025 at 8:50 AM     Wound Care Center Information: Should you experience any significant changes in your wound(s) or have questions about your wound care, please contact the [facility] Wound Center at [phone].   MONDAY through THURSDAY 8:00 am - 4:00 pm.  Friday 8:00 am - 11:30 am.   The office is closed on all major holidays.   (Hours of operation are subject to change).     Please give us 24-48 business hours to return your call.  If you need help with your wounds and cannot wait until we are available, contact your Primary Care Physician or go to your preferred emergency room.      Physician Signature:_______________________    Date: ___________ Time:  ____________    Ryan Brown CNP        Electronically signed by DU Calle CNP on 5/15/2025 at 12:26 PM

## 2025-05-15 NOTE — PATIENT INSTRUCTIONS
[facility] Wound Care Physician Orders and Discharge Instructions  [address], [address]        [place], [place] [address]  Telephone: [phone]      FAX [phone]  MONDAY - THURSDAY 8:30 am - 4:30 pm and Friday 8:30 am - 11:30 am      NAME:  Reilly Alexander  YOB: 2000  MEDICAL RECORD NUMBER:  [id number]  DATE:  5/15/2025      Return Appointment:  [x] Return Appointment: With yRan Brown CNP  in  1  Week(s)             [] Nurse Visit for Wound Assessment:  [] DME/Wound Dressing Supplies Provided by: Other n/a  WILL HAVE TO PURCHASE DRY SUPPLIES THROUGH [employer], OR AT YOUR LOCAL STORE LIKE [employer]   Please call them directly to reorder supplies when you run out)  [] ECF or Home Healthcare:  Other: n/a  Your Home Care Agency is responsible to order your supplies.   [] Orders placed during your visit:      **ANTIBIOTICS PER DR MURRELL**       Important Reminders:   Please wash hands with soap and water before and after every dressing change.  If you smoke we ask that you refrain from smoking. Smoking inhibits wounds from healing.  When taking antibiotics take the entire prescription as ordered. Do not stop taking until medication is all gone unless otherwise instructed.   Do not get wounds wet in the bath or shower unless otherwise instructed by your physician. If your wound is on your foot or leg, you may purchase a cast bag. Please ask at your local pharmacy.   IF YOU ARE UNABLE TO OBTAIN WOUND SUPPLIES, CONTINUE TO USE THE SUPPLIES YOU HAVE AVAILABLE UNTIL YOU ARE ABLE TO REACH US. IT IS MOST IMPORTANT TO KEEP THE WOUND COVERED AT ALL TIMES.  ___________________________________________________________________    Wound Location: Right  Foot    WOUND CLEANSING:Do NOT Scrub Wound   Normal Saline    Do not get dressing or wound wet in shower.    Grace-Wound Topical Treatments:   Apply immediately around the wound: Apply SKIN PREP to skin immediately around  (M6) moves spontaneously and purposely

## 2025-05-20 ENCOUNTER — HOSPITAL ENCOUNTER (OUTPATIENT)
Dept: INFUSION THERAPY | Age: 25
Setting detail: INFUSION SERIES
Discharge: HOME | End: 2025-05-20

## 2025-05-20 VITALS
TEMPERATURE: 97.9 F | OXYGEN SATURATION: 100 % | DIASTOLIC BLOOD PRESSURE: 79 MMHG | SYSTOLIC BLOOD PRESSURE: 118 MMHG | HEART RATE: 104 BPM | RESPIRATION RATE: 16 BRPM

## 2025-05-20 DIAGNOSIS — L97.509 DIABETIC FOOT ULCER WITH OSTEOMYELITIS (HCC): Primary | ICD-10-CM

## 2025-05-20 DIAGNOSIS — E11.621 DIABETIC FOOT ULCER WITH OSTEOMYELITIS (HCC): Primary | ICD-10-CM

## 2025-05-20 DIAGNOSIS — E11.69 DIABETIC FOOT ULCER WITH OSTEOMYELITIS (HCC): Primary | ICD-10-CM

## 2025-05-20 DIAGNOSIS — M86.9 DIABETIC FOOT ULCER WITH OSTEOMYELITIS (HCC): Primary | ICD-10-CM

## 2025-05-20 LAB
ALBUMIN SERPL-MCNC: 4.1 G/DL (ref 3.4–5)
ALBUMIN/GLOB SERPL: 1.4 {RATIO} (ref 1.1–2.2)
ALP SERPL-CCNC: 78 U/L (ref 40–129)
ALT SERPL-CCNC: 31 U/L (ref 10–40)
ANION GAP SERPL CALCULATED.3IONS-SCNC: 12 MMOL/L (ref 3–16)
AST SERPL-CCNC: 20 U/L (ref 15–37)
BASOPHILS # BLD: 0 K/UL (ref 0–0.2)
BASOPHILS NFR BLD: 0.9 %
BILIRUB SERPL-MCNC: 0.8 MG/DL (ref 0–1)
BUN SERPL-MCNC: 16 MG/DL (ref 7–20)
CALCIUM SERPL-MCNC: 9.5 MG/DL (ref 8.3–10.6)
CHLORIDE SERPL-SCNC: 101 MMOL/L (ref 99–110)
CO2 SERPL-SCNC: 24 MMOL/L (ref 21–32)
CREAT SERPL-MCNC: 0.4 MG/DL (ref 0.9–1.3)
CRP SERPL-MCNC: <3 MG/L (ref 0–5.1)
DEPRECATED RDW RBC AUTO: 12.4 % (ref 12.4–15.4)
EOSINOPHIL # BLD: 0.1 K/UL (ref 0–0.6)
EOSINOPHIL NFR BLD: 1.4 %
ERYTHROCYTE [SEDIMENTATION RATE] IN BLOOD BY WESTERGREN METHOD: 12 MM/HR (ref 0–15)
GFR SERPLBLD CREATININE-BSD FMLA CKD-EPI: >90 ML/MIN/{1.73_M2}
GLUCOSE SERPL-MCNC: 192 MG/DL (ref 70–99)
HCT VFR BLD AUTO: 39.6 % (ref 40.5–52.5)
HGB BLD-MCNC: 13.7 G/DL (ref 13.5–17.5)
LYMPHOCYTES # BLD: 1.9 K/UL (ref 1–5.1)
LYMPHOCYTES NFR BLD: 39.6 %
MCH RBC QN AUTO: 29.7 PG (ref 26–34)
MCHC RBC AUTO-ENTMCNC: 34.7 G/DL (ref 31–36)
MCV RBC AUTO: 85.7 FL (ref 80–100)
MONOCYTES # BLD: 0.6 K/UL (ref 0–1.3)
MONOCYTES NFR BLD: 11.7 %
NEUTROPHILS # BLD: 2.2 K/UL (ref 1.7–7.7)
NEUTROPHILS NFR BLD: 46.4 %
PLATELET # BLD AUTO: 222 K/UL (ref 135–450)
PMV BLD AUTO: 7.3 FL (ref 5–10.5)
POTASSIUM SERPL-SCNC: 4.1 MMOL/L (ref 3.5–5.1)
PROT SERPL-MCNC: 7 G/DL (ref 6.4–8.2)
RBC # BLD AUTO: 4.62 M/UL (ref 4.2–5.9)
SODIUM SERPL-SCNC: 137 MMOL/L (ref 136–145)
WBC # BLD AUTO: 4.7 K/UL (ref 4–11)

## 2025-05-20 PROCEDURE — 2500000003 HC RX 250 WO HCPCS: Performed by: INTERNAL MEDICINE

## 2025-05-20 PROCEDURE — 80053 COMPREHEN METABOLIC PANEL: CPT

## 2025-05-20 PROCEDURE — 85652 RBC SED RATE AUTOMATED: CPT

## 2025-05-20 PROCEDURE — 85025 COMPLETE CBC W/AUTO DIFF WBC: CPT

## 2025-05-20 PROCEDURE — 86140 C-REACTIVE PROTEIN: CPT

## 2025-05-20 PROCEDURE — 99212 OFFICE O/P EST SF 10 MIN: CPT

## 2025-05-20 RX ORDER — SODIUM CHLORIDE 9 MG/ML
INJECTION, SOLUTION INTRAVENOUS CONTINUOUS
Status: CANCELLED | OUTPATIENT
Start: 2025-05-21

## 2025-05-20 RX ORDER — ACETAMINOPHEN 325 MG/1
650 TABLET ORAL
Status: CANCELLED | OUTPATIENT
Start: 2025-05-21

## 2025-05-20 RX ORDER — ALBUTEROL SULFATE 90 UG/1
4 INHALANT RESPIRATORY (INHALATION) PRN
Status: CANCELLED | OUTPATIENT
Start: 2025-05-21

## 2025-05-20 RX ORDER — DIPHENHYDRAMINE HYDROCHLORIDE 50 MG/ML
50 INJECTION, SOLUTION INTRAMUSCULAR; INTRAVENOUS
Status: CANCELLED | OUTPATIENT
Start: 2025-05-21

## 2025-05-20 RX ORDER — EPINEPHRINE 1 MG/ML
0.3 INJECTION, SOLUTION, CONCENTRATE INTRAVENOUS PRN
Status: CANCELLED | OUTPATIENT
Start: 2025-05-21

## 2025-05-20 RX ORDER — ONDANSETRON 2 MG/ML
8 INJECTION INTRAMUSCULAR; INTRAVENOUS
Status: CANCELLED | OUTPATIENT
Start: 2025-05-21

## 2025-05-20 RX ORDER — SODIUM CHLORIDE 0.9 % (FLUSH) 0.9 %
5-40 SYRINGE (ML) INJECTION PRN
Status: DISCONTINUED | OUTPATIENT
Start: 2025-05-20 | End: 2025-05-21 | Stop reason: HOSPADM

## 2025-05-20 RX ORDER — HYDROCORTISONE SODIUM SUCCINATE 100 MG/2ML
100 INJECTION INTRAMUSCULAR; INTRAVENOUS
Status: CANCELLED | OUTPATIENT
Start: 2025-05-21

## 2025-05-20 RX ORDER — SODIUM CHLORIDE 0.9 % (FLUSH) 0.9 %
5-40 SYRINGE (ML) INJECTION PRN
Status: CANCELLED | OUTPATIENT
Start: 2025-05-21

## 2025-05-20 RX ADMIN — SODIUM CHLORIDE, PRESERVATIVE FREE 30 ML: 5 INJECTION INTRAVENOUS at 08:30

## 2025-05-20 NOTE — PROGRESS NOTES
Outpatient Infusion Center   [facility]    PICC Lab Draw    NAME:  Reilly Alexander  YOB: 2000  MEDICAL RECORD NUMBER:  [id number]  DATE:  5/20/2025    Patient arrived to Outpatient Infusion Center   [] per wheelchair   [x] ambulatory     PICC Location:right arm    PICC Site:  Redness: No  Bruising: No   Edema: No  Pain: No     PICC Labs  Cap cleansed with Chloroprep Scrub for 30 seconds and air dried completely for 1 minute on sterile 4X4: Yes  Blood drawn using a vaccutainer  Amount of blood wasted 10/ml syringe    Labs Obtained: Yes  Lab Test(s) Ordered: cbc, cmp, sed rate, crp  PICC Flushed with 20ml/NS: Yes  New Cap applied: Yes     Response to treatment:  Well tolerated by patient.      Electronically signed by Sindhu Hollingsworth RN on 5/20/2025 at 8:56 AM            Outpatient Infusion [facility] [place]    PICC Dressing Change    NAME:  Reilly Alexander  YOB: 2000  MEDICAL RECORD NUMBER:  [id number]  DATE:  5/20/2025    Patient arrived to Outpatient Infusion Center   [] per wheelchair   [] ambulatory     PICC Location:right arm    PICC Site:  Redness: No  Bruising: No   Edema: No  Pain: No     PICC Cleansed:  Current dressing and stat lock removed: Yes  Chloroprep Scrub for 30 seconds and air dried completely for 1 minute: Yes     PICC Dressing Change  Skin barrier: Yes  Stat lock applied and PICC line secured Yes  Biopatch applied: No: chg gel   PICC site covered with Tegaderm Yes  Date and initials applied to PICC dressing Yes  [] Other:    Next Dressing Due: May 27, 2025.     Response to treatment:  Well tolerated by patient.      Electronically signed by Sindhu Hollingsworth RN on 5/20/2025 at 8:56 AM

## 2025-05-20 NOTE — DISCHARGE INSTRUCTIONS
Outpatient Infusion Discharge Instructions  [facility]  [address] [place], [place] [address]  Telephone: [phone]      FAX [phone]    NAME:  Reilly Alexander  YOB: 2000  MEDICAL RECORD NUMBER:  [id number]  DATE:  @ED@    Reason for Outpatient Infusion Visit: picc labs with dressing change    If you develop any these symptoms please contact you Doctor    [x] Nausea and/or vomiting not relieved with medication   [x] Swelling, redness, and/or bleeding at injection or IV site    [x] Fever or chills  [x] Rash or itching   [x] Shortness of breath  [] Please review After Visit Summary (AVS) information on    [] Other      Outpatient Infusion Center Information: Should you experience any significant changes in your health or have questions about your care please contact the [facility] at [phone] MONDAY - FRIDAY 8:00 am - 4:00 pm.  If you need help outside these hours and cannot wait until we are again available, contact your Primary Care Physician or go to the hospital emergency room.       Electronically signed by Sindhu Hollingsworth RN on 5/20/2025 at 8:55 AM

## 2025-05-22 ENCOUNTER — PHARMACY VISIT (OUTPATIENT)
Dept: PHARMACY | Age: 25
End: 2025-05-22

## 2025-05-22 ENCOUNTER — HOSPITAL ENCOUNTER (OUTPATIENT)
Dept: WOUND CARE | Age: 25
Discharge: HOME | End: 2025-05-22
Attending: NURSE PRACTITIONER

## 2025-05-22 VITALS
TEMPERATURE: 96.8 F | RESPIRATION RATE: 16 BRPM | SYSTOLIC BLOOD PRESSURE: 108 MMHG | DIASTOLIC BLOOD PRESSURE: 76 MMHG | HEART RATE: 123 BPM

## 2025-05-22 DIAGNOSIS — M79.89 LEG SWELLING: ICD-10-CM

## 2025-05-22 DIAGNOSIS — E11.69 DIABETIC FOOT ULCER WITH OSTEOMYELITIS (HCC): ICD-10-CM

## 2025-05-22 DIAGNOSIS — E08.65 DIABETES MELLITUS DUE TO UNDERLYING CONDITION WITH HYPERGLYCEMIA, WITH LONG-TERM CURRENT USE OF INSULIN (HCC): Primary | ICD-10-CM

## 2025-05-22 DIAGNOSIS — L97.509 DIABETIC FOOT ULCER WITH OSTEOMYELITIS (HCC): ICD-10-CM

## 2025-05-22 DIAGNOSIS — L97.413 DIABETIC ULCER OF RIGHT MIDFOOT ASSOCIATED WITH TYPE 2 DIABETES MELLITUS, WITH NECROSIS OF MUSCLE (HCC): Primary | ICD-10-CM

## 2025-05-22 DIAGNOSIS — E11.621 DIABETIC ULCER OF RIGHT MIDFOOT ASSOCIATED WITH TYPE 2 DIABETES MELLITUS, WITH NECROSIS OF MUSCLE (HCC): Primary | ICD-10-CM

## 2025-05-22 DIAGNOSIS — M86.9 DIABETIC FOOT ULCER WITH OSTEOMYELITIS (HCC): ICD-10-CM

## 2025-05-22 DIAGNOSIS — F17.200 CURRENT SMOKER: ICD-10-CM

## 2025-05-22 DIAGNOSIS — Z79.4 DIABETES MELLITUS DUE TO UNDERLYING CONDITION WITH HYPERGLYCEMIA, WITH LONG-TERM CURRENT USE OF INSULIN (HCC): Primary | ICD-10-CM

## 2025-05-22 DIAGNOSIS — E11.621 DIABETIC FOOT ULCER WITH OSTEOMYELITIS (HCC): ICD-10-CM

## 2025-05-22 PROCEDURE — 11046 DBRDMT MUSC&/FSCA EA ADDL: CPT

## 2025-05-22 PROCEDURE — 11043 DBRDMT MUSC&/FSCA 1ST 20/<: CPT

## 2025-05-22 PROCEDURE — 99214 OFFICE O/P EST MOD 30 MIN: CPT

## 2025-05-22 RX ORDER — LIDOCAINE HYDROCHLORIDE 20 MG/ML
JELLY TOPICAL PRN
OUTPATIENT
Start: 2025-05-22

## 2025-05-22 RX ORDER — LIDOCAINE HYDROCHLORIDE 40 MG/ML
SOLUTION TOPICAL PRN
OUTPATIENT
Start: 2025-05-22

## 2025-05-22 RX ORDER — BETAMETHASONE DIPROPIONATE 0.5 MG/G
CREAM TOPICAL PRN
OUTPATIENT
Start: 2025-05-22

## 2025-05-22 RX ORDER — CLOBETASOL PROPIONATE 0.5 MG/G
OINTMENT TOPICAL PRN
OUTPATIENT
Start: 2025-05-22

## 2025-05-22 RX ORDER — LIDOCAINE 40 MG/G
CREAM TOPICAL PRN
OUTPATIENT
Start: 2025-05-22

## 2025-05-22 RX ORDER — LIDOCAINE 50 MG/G
OINTMENT TOPICAL PRN
OUTPATIENT
Start: 2025-05-22

## 2025-05-22 RX ORDER — MUPIROCIN 20 MG/G
OINTMENT TOPICAL PRN
OUTPATIENT
Start: 2025-05-22

## 2025-05-22 RX ORDER — GINSENG 100 MG
CAPSULE ORAL PRN
OUTPATIENT
Start: 2025-05-22

## 2025-05-22 RX ORDER — METFORMIN HYDROCHLORIDE 500 MG/1
500 TABLET, EXTENDED RELEASE ORAL
Qty: 30 TABLET | Refills: 0 | Status: SHIPPED | OUTPATIENT
Start: 2025-05-22

## 2025-05-22 RX ORDER — INSULIN GLARGINE 100 [IU]/ML
30 INJECTION, SOLUTION SUBCUTANEOUS NIGHTLY
Qty: 5 ADJUSTABLE DOSE PRE-FILLED PEN SYRINGE | Refills: 0 | Status: SHIPPED | OUTPATIENT
Start: 2025-05-22

## 2025-05-22 RX ORDER — SODIUM CHLOR/HYPOCHLOROUS ACID 0.033 %
SOLUTION, IRRIGATION IRRIGATION PRN
OUTPATIENT
Start: 2025-05-22

## 2025-05-22 RX ORDER — BACITRACIN ZINC AND POLYMYXIN B SULFATE 500; 1000 [USP'U]/G; [USP'U]/G
OINTMENT TOPICAL PRN
OUTPATIENT
Start: 2025-05-22

## 2025-05-22 RX ORDER — INSULIN LISPRO 100 [IU]/ML
12 INJECTION, SOLUTION INTRAVENOUS; SUBCUTANEOUS
Qty: 5 ADJUSTABLE DOSE PRE-FILLED PEN SYRINGE | Refills: 0 | Status: SHIPPED | OUTPATIENT
Start: 2025-05-22

## 2025-05-22 RX ORDER — GENTAMICIN SULFATE 1 MG/G
OINTMENT TOPICAL PRN
OUTPATIENT
Start: 2025-05-22

## 2025-05-22 RX ORDER — LIDOCAINE HYDROCHLORIDE 40 MG/ML
SOLUTION TOPICAL PRN
Status: DISCONTINUED | OUTPATIENT
Start: 2025-05-22 | End: 2025-05-23 | Stop reason: HOSPADM

## 2025-05-22 RX ORDER — TRIAMCINOLONE ACETONIDE 1 MG/G
OINTMENT TOPICAL PRN
OUTPATIENT
Start: 2025-05-22

## 2025-05-22 RX ORDER — NEOMYCIN/BACITRACIN/POLYMYXINB 3.5-400-5K
OINTMENT (GRAM) TOPICAL PRN
OUTPATIENT
Start: 2025-05-22

## 2025-05-22 RX ADMIN — LIDOCAINE HYDROCHLORIDE: 40 SOLUTION TOPICAL at 09:39

## 2025-05-22 NOTE — PROGRESS NOTES
s/p toe amputation    - Sent referral to [facility]  - Sent prescriptions to [facility] for:  - Lantus, pen needles, Humalog, Metformin    - Continue Metformin  mg daily with breakfast  - Increase Lantus to 44 units nightly  - Continue Humalog 12 units three times daily before meals.    Sliding scale  Less than 150  No insulin  150-199   1 unit  200-249   2 units  250-299   3 units  300-349   4 units  350-400             5 units  Over 400             6 units    - Add 1 unit for every 15 grams of carb over 60      Orders Placed This Encounter   Medications    insulin glargine (LANTUS SOLOSTAR) 100 UNIT/ML injection pen     Sig: Inject 30 Units into the skin nightly Can substitute with Basaglar or Semglee if needed     Dispense:  5 Adjustable Dose Pre-filled Pen Syringe     Refill:  0    Insulin Pen Needle 32G X 4 MM MISC     Si each by Does not apply route in the morning, at noon, in the evening, and at bedtime Can substitute with alternative pen needles if necessary     Dispense:  200 each     Refill:  0    metFORMIN (GLUCOPHAGE-XR) 500 MG extended release tablet     Sig: Take 1 tablet by mouth daily (with breakfast)     Dispense:  30 tablet     Refill:  0    insulin lispro, 1 Unit Dial, (HUMALOG KWIKPEN) 100 UNIT/ML SOPN     Sig: Inject 12 Units into the skin 3 times daily (before meals) Plus sliding scale:<150 No insulin;150-199 1 unit;200-249 2 units;250-299 3 units;300-349 4 units;350-400 5 units;>400 6 units     Dispense:  5 Adjustable Dose Pre-filled Pen Syringe     Refill:  0     No orders of the defined types were placed in this encounter.        [facility]patient [facility] Follow-up:  2 weeks    Patient Instructions:  I will send prescriptions to [facility] for:  - Lantus  - pen needles  - Humalog  - Metformin    - Continue Metformin  mg daily with breakfast  - Increase Lantus to 44 units nightly  - Continue Humalog 12 units three times daily before meals.    Sliding

## 2025-05-22 NOTE — PATIENT INSTRUCTIONS
TIMES.  ___________________________________________________________________    Wound Location: Right  Foot    WOUND CLEANSING:Do NOT Scrub Wound   Normal Saline VASHE at clinic only     Do not get dressing or wound wet in shower.    Grace-Wound Topical Treatments:   Apply immediately around the wound: Apply ZINC CREAM to skin surrounding the wound.    PRIMARY DRESSING: XEROFORM    SECONDARY DRESSING: 4X4 non woven gauze pad and Kerlix/Rolled Gauze                            Please provide Surgical shoe today   Ace wrap    Dressing Frequency: Every other day   ___________________________________________________________________________________________    ____________________________________________________________________________________________  Negative Pressure:   N/A  _____________________________________________________________________________________________   Pressure Relief and Off Loading:  N/A  Specialty equipment ordered:         []Wheelchair cushion            [] Specialty Bed/Mattress  ______________________________________________________________________________________________                       [x]Activity: Avoid weight bearing to right lower leg                    [x] Assistive Devices   Knee scooter    Use as instructed by the provider     ____________________________________________________________________________________________     Dietary: Continue your diet as tolerated.  Important dietary reminders:  1. Increase Protein intake (i.e. Lean meats, fish, eggs, legumes, and yogurt).   2. Limit Sodium, Alcohol and Sugar.   3. If diabetic, follow a diabetic diet and check glucose prior to meals or as instructed by your physician.     May choose one of the Suggested Dietary Supplements below:   Shai       30ml ProStat  EnsureEnlive   Ensure Max/Premier  ____________________________________________________________________________________________  Pain:   Please Note : some pain, drainage and/or

## 2025-05-22 NOTE — PATIENT INSTRUCTIONS
I will send prescriptions to [facility] for:  - Lantus  - pen needles  - Humalog  - Metformin    - Continue Metformin  mg daily with breakfast  - Increase Lantus to 44 units nightly  - Continue Humalog 12 units three times daily before meals.    Sliding scale  Less than 150  No insulin  150-199   1 unit  200-249   2 units  250-299   3 units  300-349   4 units  350-400             5 units  Over 400             6 units    - Add 1 unit for every 15 grams of carb over 60    - Use the log provided to write meals and insulin doses or you can document in your Dexcom reader  - Goal is about 45-60 grams of carb per meal, 15-20 for snacks.  Try to pair protein and fiber with carbs in your meals and snacks.  - Check your blood sugar before each meal and at bedtime.  - Call us if you are having blood sugars less than 70 mg/dl or greater than 300 mg/dl.      - If you have a blood sugar less than 70 mg/dl, eat or drink 15 grams of carbohydrate (sugar) and recheck your blood sugar in 15 minutes.  Keep repeating this if necessary until your blood sugar is 70 mg/dl or above.   Yes

## 2025-05-22 NOTE — PROGRESS NOTES
___________________________________________________________________________________________    ____________________________________________________________________________________________  Negative Pressure:   N/A  _____________________________________________________________________________________________   Pressure Relief and Off Loading:  N/A  Specialty equipment ordered:         []Wheelchair cushion            [] Specialty Bed/Mattress  ______________________________________________________________________________________________                       [x]Activity: Avoid weight bearing to right lower leg                    [x] Assistive Devices   Knee scooter    Use as instructed by the provider     ____________________________________________________________________________________________     Dietary: Continue your diet as tolerated.  Important dietary reminders:  1. Increase Protein intake (i.e. Lean meats, fish, eggs, legumes, and yogurt).   2. Limit Sodium, Alcohol and Sugar.   3. If diabetic, follow a diabetic diet and check glucose prior to meals or as instructed by your physician.     May choose one of the Suggested Dietary Supplements below:   Shai       30ml ProStat  EnsureEnlive   Ensure Max/Premier  ____________________________________________________________________________________________  Pain:   Please Note : some pain, drainage and/or bleeding might be expected after seeing the provider.     If you are still having pain after you go home:  For wounds on lower legs or arms, elevate the affected limb.  Use over-the-counter medications you would normally use for pain as permitted by your primary care doctor.    For Persistent Pain not relieved by the above interventions, please notify your family doctor.     Seek immediate medical care if:    You have symptoms of infection, such as:  Increased pain, swelling, warmth, or redness.  Red streaks leading from the area.  Pus draining from the area.  A  DISCHARGE

## 2025-05-27 ENCOUNTER — TELEPHONE (OUTPATIENT)
Dept: INFECTIOUS DISEASES | Age: 25
End: 2025-05-27

## 2025-05-27 ENCOUNTER — HOSPITAL ENCOUNTER (OUTPATIENT)
Dept: INFUSION THERAPY | Age: 25
Setting detail: INFUSION SERIES
Discharge: HOME | End: 2025-05-27

## 2025-05-27 VITALS
RESPIRATION RATE: 16 BRPM | TEMPERATURE: 97.9 F | HEART RATE: 100 BPM | DIASTOLIC BLOOD PRESSURE: 91 MMHG | SYSTOLIC BLOOD PRESSURE: 121 MMHG

## 2025-05-27 DIAGNOSIS — E11.621 DIABETIC FOOT ULCER WITH OSTEOMYELITIS (HCC): Primary | ICD-10-CM

## 2025-05-27 DIAGNOSIS — M86.9 DIABETIC FOOT ULCER WITH OSTEOMYELITIS (HCC): Primary | ICD-10-CM

## 2025-05-27 DIAGNOSIS — E11.69 DIABETIC FOOT ULCER WITH OSTEOMYELITIS (HCC): Primary | ICD-10-CM

## 2025-05-27 DIAGNOSIS — L97.509 DIABETIC FOOT ULCER WITH OSTEOMYELITIS (HCC): Primary | ICD-10-CM

## 2025-05-27 LAB
ALBUMIN SERPL-MCNC: 4 G/DL (ref 3.4–5)
ALBUMIN/GLOB SERPL: 1.6 {RATIO} (ref 1.1–2.2)
ALP SERPL-CCNC: 81 U/L (ref 40–129)
ALT SERPL-CCNC: 36 U/L (ref 10–40)
ANION GAP SERPL CALCULATED.3IONS-SCNC: 10 MMOL/L (ref 3–16)
AST SERPL-CCNC: 21 U/L (ref 15–37)
BASOPHILS # BLD: 0 K/UL (ref 0–0.2)
BASOPHILS NFR BLD: 0.6 %
BILIRUB SERPL-MCNC: 0.8 MG/DL (ref 0–1)
BUN SERPL-MCNC: 17 MG/DL (ref 7–20)
CALCIUM SERPL-MCNC: 9.3 MG/DL (ref 8.3–10.6)
CHLORIDE SERPL-SCNC: 104 MMOL/L (ref 99–110)
CO2 SERPL-SCNC: 25 MMOL/L (ref 21–32)
CREAT SERPL-MCNC: 0.5 MG/DL (ref 0.9–1.3)
CRP SERPL-MCNC: <3 MG/L (ref 0–5.1)
DEPRECATED RDW RBC AUTO: 12.3 % (ref 12.4–15.4)
EOSINOPHIL # BLD: 0 K/UL (ref 0–0.6)
EOSINOPHIL NFR BLD: 0.8 %
ERYTHROCYTE [SEDIMENTATION RATE] IN BLOOD BY WESTERGREN METHOD: 5 MM/HR (ref 0–15)
GFR SERPLBLD CREATININE-BSD FMLA CKD-EPI: >90 ML/MIN/{1.73_M2}
GLUCOSE SERPL-MCNC: 262 MG/DL (ref 70–99)
HCT VFR BLD AUTO: 39.4 % (ref 40.5–52.5)
HGB BLD-MCNC: 13.8 G/DL (ref 13.5–17.5)
LYMPHOCYTES # BLD: 1.4 K/UL (ref 1–5.1)
LYMPHOCYTES NFR BLD: 33.5 %
MCH RBC QN AUTO: 29.7 PG (ref 26–34)
MCHC RBC AUTO-ENTMCNC: 34.9 G/DL (ref 31–36)
MCV RBC AUTO: 85.1 FL (ref 80–100)
MONOCYTES # BLD: 0.3 K/UL (ref 0–1.3)
MONOCYTES NFR BLD: 8.1 %
NEUTROPHILS # BLD: 2.4 K/UL (ref 1.7–7.7)
NEUTROPHILS NFR BLD: 57 %
PLATELET # BLD AUTO: 210 K/UL (ref 135–450)
PMV BLD AUTO: 7.4 FL (ref 5–10.5)
POTASSIUM SERPL-SCNC: 4.2 MMOL/L (ref 3.5–5.1)
PROT SERPL-MCNC: 6.5 G/DL (ref 6.4–8.2)
RBC # BLD AUTO: 4.63 M/UL (ref 4.2–5.9)
SODIUM SERPL-SCNC: 139 MMOL/L (ref 136–145)
WBC # BLD AUTO: 4.2 K/UL (ref 4–11)

## 2025-05-27 PROCEDURE — 99212 OFFICE O/P EST SF 10 MIN: CPT

## 2025-05-27 PROCEDURE — 80053 COMPREHEN METABOLIC PANEL: CPT

## 2025-05-27 PROCEDURE — 85652 RBC SED RATE AUTOMATED: CPT

## 2025-05-27 PROCEDURE — 86140 C-REACTIVE PROTEIN: CPT

## 2025-05-27 PROCEDURE — 85025 COMPLETE CBC W/AUTO DIFF WBC: CPT

## 2025-05-27 PROCEDURE — 2500000003 HC RX 250 WO HCPCS: Performed by: INTERNAL MEDICINE

## 2025-05-27 RX ORDER — EPINEPHRINE 1 MG/ML
0.3 INJECTION, SOLUTION, CONCENTRATE INTRAVENOUS PRN
OUTPATIENT
Start: 2025-05-28

## 2025-05-27 RX ORDER — ONDANSETRON 2 MG/ML
8 INJECTION INTRAMUSCULAR; INTRAVENOUS
OUTPATIENT
Start: 2025-05-28

## 2025-05-27 RX ORDER — ALBUTEROL SULFATE 90 UG/1
4 INHALANT RESPIRATORY (INHALATION) PRN
OUTPATIENT
Start: 2025-05-28

## 2025-05-27 RX ORDER — DIPHENHYDRAMINE HYDROCHLORIDE 50 MG/ML
50 INJECTION, SOLUTION INTRAMUSCULAR; INTRAVENOUS
OUTPATIENT
Start: 2025-05-28

## 2025-05-27 RX ORDER — HYDROCORTISONE SODIUM SUCCINATE 100 MG/2ML
100 INJECTION INTRAMUSCULAR; INTRAVENOUS
OUTPATIENT
Start: 2025-05-28

## 2025-05-27 RX ORDER — ACETAMINOPHEN 325 MG/1
650 TABLET ORAL
OUTPATIENT
Start: 2025-05-28

## 2025-05-27 RX ORDER — SODIUM CHLORIDE 0.9 % (FLUSH) 0.9 %
5-40 SYRINGE (ML) INJECTION PRN
Status: DISCONTINUED | OUTPATIENT
Start: 2025-05-27 | End: 2025-05-28 | Stop reason: HOSPADM

## 2025-05-27 RX ORDER — SODIUM CHLORIDE 0.9 % (FLUSH) 0.9 %
5-40 SYRINGE (ML) INJECTION PRN
OUTPATIENT
Start: 2025-05-28

## 2025-05-27 RX ORDER — SODIUM CHLORIDE 9 MG/ML
INJECTION, SOLUTION INTRAVENOUS CONTINUOUS
OUTPATIENT
Start: 2025-05-28

## 2025-05-27 RX ADMIN — Medication 30 ML: at 12:04

## 2025-05-27 NOTE — PROGRESS NOTES
Outpatient Infusion Center   [facility]    PICC Dressing Change    NAME:  Reilly Alexander  YOB: 2000  MEDICAL RECORD NUMBER:  [id number]  DATE:  5/27/2025    Patient arrived to Outpatient Infusion Center   [] per wheelchair   [x] ambulatory     PICC Location:right arm    PICC Site:  Redness: No  Bruising: No   Edema: No  Pain: No     PICC Cleansed:  Current dressing and stat lock removed: Yes  Chloroprep Scrub for 30 seconds and air dried completely for 1 minute: Yes     PICC Dressing Change  Skin barrier: Yes  Stat lock applied and PICC line secured Yes  Biopatch applied: No: chg gel   PICC site covered with Tegaderm Yes  Date and initials applied to PICC dressing Yes  [] Other:    Next Dressing Due: Tammy 3, 2025.     Response to treatment:  Well tolerated by patient.      Electronically signed by Sindhu Hollingsworth RN on 5/27/2025 at 12:02 [facility]   [facility]    PICC Lab Draw    NAME:  Reilly Alexander  YOB: 2000  MEDICAL RECORD NUMBER:  [id number]  DATE:  5/27/2025    Patient arrived to Outpatient Infusion Center   [] per wheelchair   [x] ambulatory     PICC Location:right arm    PICC Site:  Redness: No  Bruising: No   Edema: No  Pain: No     PICC Labs  Cap cleansed with Chloroprep Scrub for 30 seconds and air dried completely for 1 minute on sterile 4X4: Yes  Blood drawn using a vaccutainer  Amount of blood wasted 10/ml syringe    Labs Obtained: Yes  Lab Test(s) Ordered: cbc, sed rate, crp, cmp  PICC Flushed with 20ml/NS: Yes  New Cap applied: Yes     Response to treatment:  Well tolerated by patient. Pt unaware if he needs to have f/u appt with Dr. Hill prior to d/c.  Left message for Sandra. Awaiting for response.  Pt aware to come back next Tuesday for dressing change and possible picc line d/c later in the week.      Electronically signed by Sindhu Hollingsworth RN on 5/27/2025 at 12:02 PM

## 2025-05-27 NOTE — PROGRESS NOTES
Clinic Level of Care Assessment  Infusion Center      NAME:  Reilly Alexander  YOB: 2000 GENDER: male  MEDICAL RECORD NUMBER:  [id number]   DATE:  5/27/2025     Ambulation Status Document in Daily Care/Safety Tab   Status Definition Points   Independent Independently able to ambulate.  Fully able (without any assistance) to get on/off exam table/chair.  [x]   0   Minimal Physical Assistance Requires physical assistance of one person to ambulate and/or position patient to be examined. Includes necessary physical assistance to position lower extremities on/off stool. []   1   Moderate Physical Assistance Requires at least one staff member to physically assist patient in ambulating into treatment room, and/or on off chair/bed.  Requires assistance to bathroom. []   2   Full Assistance Requires assistance of at least two staff members to transfer patient into treatment room and/or on/off bed/chair. \"Total Transfer\".  Unable to use bathroom requires bedside commode and/or bedpan []   3       Dressing Complexity Document in LDA Navigator  Complexity Definition Points   No Dressing  []   0   Simple Minimal, simple dressing. i.e. bandaid, gauze, simple wrap.  Peripheral IV dressing. []   1   Intermediate Moderately complicated PICC dressing change requiring sterile procedure and site assessment. [x]   2   Complex Complicated dressing change port access requiring sterile procedure and site assessment.  []   3       Teaching Effort Document in AVS and/or Education Navigator    Effort Definition Points   No Teaching  [x]   0   Simple Teach two or less topics.  Teaching documented in discharge instructions in AVS and copy given to patient. []   1   Intermediate Teach three to four topics.  Teaching documented in Discharge Instructions in AVS using References and Attachments. Copy given to patient.   []   2   Complex Teach five to six topics. Teaching documented in Discharge Instructions in AVS using References and

## 2025-05-27 NOTE — TELEPHONE ENCOUNTER
Left VM for pt to please return my call to update me on how he is doing  My name, title, Dr Hill's name, specialty and affiliation  Pt name   My direct number [phone]  OPAT Nurse Coordinator Weekly Update Note    Current OPAT plan:  Ertapenem 1 gm iv daily through 6/4/25    Diagnosis:   R DM foot infection / osteomyelitis    Assessment:  pt states foot looks great, \"doctor is happy.  Just need to keep it dry so the skin can close up\".  Pt is tolerating IV ATB without adverse SE.  Weekly visits with wound care.    Follow up appts:  Dr Brown 5/29

## 2025-05-27 NOTE — DISCHARGE INSTRUCTIONS
Outpatient Infusion Discharge Instructions  [facility]  [address] [place], [place] [address]  Telephone: [phone]      FAX [phone]    NAME:  Reilly Alexander  YOB: 2000  MEDICAL RECORD NUMBER:  [id number]  DATE:  @ED@    Reason for Outpatient Infusion Visit: picc line dressing change, lab draws    If you develop any these symptoms please contact you Doctor    [x] Nausea and/or vomiting not relieved with medication   [x] Swelling, redness, and/or bleeding at injection or IV site    [x] Fever or chills  [x] Rash or itching   [x] Shortness of breath  [] Please review After Visit Summary (AVS) information on    [] Other      Outpatient Infusion Center Information: Should you experience any significant changes in your health or have questions about your care please contact the [facility] at [phone] MONDAY - FRIDAY 8:00 am - 4:00 pm.  If you need help outside these hours and cannot wait until we are again available, contact your Primary Care Physician or go to the hospital emergency room.       Electronically signed by Sindhu Hollingsworth RN on 5/27/2025 at 12:14 PM

## 2025-05-29 ENCOUNTER — HOSPITAL ENCOUNTER (OUTPATIENT)
Dept: WOUND CARE | Age: 25
Discharge: HOME | End: 2025-05-29
Attending: NURSE PRACTITIONER

## 2025-05-29 VITALS
RESPIRATION RATE: 16 BRPM | HEART RATE: 108 BPM | TEMPERATURE: 96.8 F | DIASTOLIC BLOOD PRESSURE: 76 MMHG | SYSTOLIC BLOOD PRESSURE: 120 MMHG

## 2025-05-29 DIAGNOSIS — M79.89 LEG SWELLING: ICD-10-CM

## 2025-05-29 DIAGNOSIS — E11.621 DIABETIC ULCER OF RIGHT MIDFOOT ASSOCIATED WITH TYPE 2 DIABETES MELLITUS, WITH NECROSIS OF MUSCLE (HCC): Primary | ICD-10-CM

## 2025-05-29 DIAGNOSIS — L97.413 DIABETIC ULCER OF RIGHT MIDFOOT ASSOCIATED WITH TYPE 2 DIABETES MELLITUS, WITH NECROSIS OF MUSCLE (HCC): Primary | ICD-10-CM

## 2025-05-29 PROCEDURE — 11043 DBRDMT MUSC&/FSCA 1ST 20/<: CPT

## 2025-05-29 PROCEDURE — 11043 DBRDMT MUSC&/FSCA 1ST 20/<: CPT | Performed by: NURSE PRACTITIONER

## 2025-05-29 RX ORDER — LIDOCAINE 40 MG/G
CREAM TOPICAL PRN
OUTPATIENT
Start: 2025-05-29

## 2025-05-29 RX ORDER — GENTAMICIN SULFATE 1 MG/G
OINTMENT TOPICAL PRN
OUTPATIENT
Start: 2025-05-29

## 2025-05-29 RX ORDER — BETAMETHASONE DIPROPIONATE 0.5 MG/G
CREAM TOPICAL PRN
OUTPATIENT
Start: 2025-05-29

## 2025-05-29 RX ORDER — LIDOCAINE HYDROCHLORIDE 20 MG/ML
JELLY TOPICAL PRN
OUTPATIENT
Start: 2025-05-29

## 2025-05-29 RX ORDER — CLOBETASOL PROPIONATE 0.5 MG/G
OINTMENT TOPICAL PRN
OUTPATIENT
Start: 2025-05-29

## 2025-05-29 RX ORDER — LIDOCAINE HYDROCHLORIDE 40 MG/ML
SOLUTION TOPICAL PRN
OUTPATIENT
Start: 2025-05-29

## 2025-05-29 RX ORDER — NEOMYCIN/BACITRACIN/POLYMYXINB 3.5-400-5K
OINTMENT (GRAM) TOPICAL PRN
OUTPATIENT
Start: 2025-05-29

## 2025-05-29 RX ORDER — LIDOCAINE 50 MG/G
OINTMENT TOPICAL PRN
OUTPATIENT
Start: 2025-05-29

## 2025-05-29 RX ORDER — LIDOCAINE HYDROCHLORIDE 40 MG/ML
SOLUTION TOPICAL PRN
Status: DISCONTINUED | OUTPATIENT
Start: 2025-05-29 | End: 2025-05-30 | Stop reason: HOSPADM

## 2025-05-29 RX ORDER — GINSENG 100 MG
CAPSULE ORAL PRN
OUTPATIENT
Start: 2025-05-29

## 2025-05-29 RX ORDER — MUPIROCIN 20 MG/G
OINTMENT TOPICAL PRN
OUTPATIENT
Start: 2025-05-29

## 2025-05-29 RX ORDER — SODIUM CHLOR/HYPOCHLOROUS ACID 0.033 %
SOLUTION, IRRIGATION IRRIGATION PRN
OUTPATIENT
Start: 2025-05-29

## 2025-05-29 RX ORDER — TRIAMCINOLONE ACETONIDE 1 MG/G
OINTMENT TOPICAL PRN
OUTPATIENT
Start: 2025-05-29

## 2025-05-29 RX ORDER — BACITRACIN ZINC AND POLYMYXIN B SULFATE 500; 1000 [USP'U]/G; [USP'U]/G
OINTMENT TOPICAL PRN
OUTPATIENT
Start: 2025-05-29

## 2025-05-29 RX ADMIN — LIDOCAINE HYDROCHLORIDE 5 ML: 40 SOLUTION TOPICAL at 09:10

## 2025-05-29 ASSESSMENT — PAIN SCALES - GENERAL: PAINLEVEL_OUTOF10: 0

## 2025-05-29 NOTE — PATIENT INSTRUCTIONS
[facility] Wound Care Physician Orders and Discharge Instructions  [address], [address]        [place], [place] [address]  Telephone: [phone]      FAX [phone]  MONDAY - THURSDAY 8:30 am - 4:30 pm and Friday 8:30 am - 11:30 am      NAME:  Reilly Alexander  YOB: 2000  MEDICAL RECORD NUMBER:  [id number]  DATE:  5/29/2025      Return Appointment:  [x] Return Appointment: With Ryan Brown CNP  in  1  Week(s)             [] Nurse Visit for Wound Assessment:  [] DME/Wound Dressing Supplies Provided by: Other n/a  WILL HAVE TO PURCHASE SUPPLIES THROUGH [employer], OR AT YOUR LOCAL STORE LIKE [employer]   Please call them directly to reorder supplies when you run out)  [] ECF or Home Healthcare:  Other: n/a  Your Home Care Agency is responsible to order your supplies.   [] Orders placed during your visit:      Please purchase Zinc Oxide or Desitin cream:$15-30 Dollars     If looking for Zinc Oxide, it has several percentages, we recommended 20%- 40%    MAY PURCHASE OVER THE COUNTER DESITIN         Please call to schedule an appointment:   Dr Bourne   [address], [address], [place], [place] [address] [phone]     **ANTIBIOTICS PER DR CASTANEDA**     Important Reminders:   Please wash hands with soap and water before and after every dressing change.  If you smoke we ask that you refrain from smoking. Smoking inhibits wounds from healing.  When taking antibiotics take the entire prescription as ordered. Do not stop taking until medication is all gone unless otherwise instructed.   Do not get wounds wet in the bath or shower unless otherwise instructed by your physician. If your wound is on your foot or leg, you may purchase a cast bag. Please ask at your local pharmacy.   IF YOU ARE UNABLE TO OBTAIN WOUND SUPPLIES, CONTINUE TO USE THE SUPPLIES YOU HAVE AVAILABLE UNTIL YOU ARE ABLE TO REACH US. IT IS MOST IMPORTANT TO KEEP THE WOUND COVERED AT ALL

## 2025-05-29 NOTE — PROGRESS NOTES
[facility]   Progress Note and Procedure Note      Reilly Alexander  MEDICAL RECORD NUMBER:  [id number]  AGE: 24 y.o.   GENDER: male  : 2000  EPISODE DATE:  2025    Subjective:     Chief Complaint   Patient presents with    Wound Check     F/u visit - right foot wound         HISTORY of PRESENT ILLNESS HPI     Reilly Alexander is a 24 y.o. male who presents today for wound/ulcer evaluation.   History of Wound Context: Diabetic foot ulcer of right lateral foot.  Patient presented to the hospital on 25 with DKA and admitted to ICU.  A1C was 11.6.  Status post I&D on ,  and  by Dr. Bourne; exposed tendon has been surgically debrided.  Patient is still receiving IV Invanz via a right basilic PICC per infusion center/home health.    25:  Patient started metformin today, along with his insulin.  He had not eaten breakfast.  Upon arrival, BP was 103/81 and .  He wasn't feeling well post-debridement.  Was given costa crackers.  Repeat BP 97/63, .  Was given additional costa crackers with peanut butter and cranberry juice.  Repeat FBS was 109, then 5 minutes later, dropped to 100, then 98.  Another 5 minutes, repeat FBS was 102.  After 10 minutes, FBS was 136.  Wound has improved since last week, less tendon exposure.      5/15/25:  Wound continues to improve.      25: Discussed at length the importance of proper footcare and offloading of diabetic foot ulcers.  Patient arrived today with right foot sandal without offloading stating that he was using a cane today instead of his knee scooter to maintain nonweightbearing status to right foot ordered per Dr. Bourne.  Patient encouraged to schedule follow-up appointment with Dr. Bourne as he has not followed up since surgical procedure.  Zinc oxide added to periwound skin due to increased maceration. Surgical shoe provided for offloading.  Patient remains on IV antibiotics via PICC line per ID.

## 2025-06-03 ENCOUNTER — PHARMACY VISIT (OUTPATIENT)
Dept: PHARMACY | Age: 25
End: 2025-06-03

## 2025-06-03 ENCOUNTER — TELEPHONE (OUTPATIENT)
Dept: INFECTIOUS DISEASES | Age: 25
End: 2025-06-03

## 2025-06-03 ENCOUNTER — HOSPITAL ENCOUNTER (OUTPATIENT)
Dept: INFUSION THERAPY | Age: 25
Setting detail: INFUSION SERIES
Discharge: HOME | End: 2025-06-03

## 2025-06-03 VITALS
BODY MASS INDEX: 26.95 KG/M2 | WEIGHT: 210 LBS | SYSTOLIC BLOOD PRESSURE: 120 MMHG | OXYGEN SATURATION: 98 % | TEMPERATURE: 98.1 F | RESPIRATION RATE: 16 BRPM | HEIGHT: 74 IN | DIASTOLIC BLOOD PRESSURE: 79 MMHG | HEART RATE: 103 BPM

## 2025-06-03 DIAGNOSIS — L97.509 TYPE 2 DIABETES MELLITUS WITH FOOT ULCER, WITH LONG-TERM CURRENT USE OF INSULIN (HCC): Primary | ICD-10-CM

## 2025-06-03 DIAGNOSIS — E11.621 TYPE 2 DIABETES MELLITUS WITH FOOT ULCER, WITH LONG-TERM CURRENT USE OF INSULIN (HCC): Primary | ICD-10-CM

## 2025-06-03 DIAGNOSIS — L97.509 DIABETIC FOOT ULCER WITH OSTEOMYELITIS (HCC): Primary | ICD-10-CM

## 2025-06-03 DIAGNOSIS — E11.69 DIABETIC FOOT ULCER WITH OSTEOMYELITIS (HCC): Primary | ICD-10-CM

## 2025-06-03 DIAGNOSIS — E11.621 DIABETIC FOOT ULCER WITH OSTEOMYELITIS (HCC): Primary | ICD-10-CM

## 2025-06-03 DIAGNOSIS — M86.9 DIABETIC FOOT ULCER WITH OSTEOMYELITIS (HCC): Primary | ICD-10-CM

## 2025-06-03 DIAGNOSIS — Z79.4 TYPE 2 DIABETES MELLITUS WITH FOOT ULCER, WITH LONG-TERM CURRENT USE OF INSULIN (HCC): Primary | ICD-10-CM

## 2025-06-03 LAB
ALBUMIN SERPL-MCNC: 4.2 G/DL (ref 3.4–5)
ALBUMIN/GLOB SERPL: 1.6 {RATIO} (ref 1.1–2.2)
ALP SERPL-CCNC: 86 U/L (ref 40–129)
ALT SERPL-CCNC: 44 U/L (ref 10–40)
ANION GAP SERPL CALCULATED.3IONS-SCNC: 11 MMOL/L (ref 3–16)
AST SERPL-CCNC: 21 U/L (ref 15–37)
BASOPHILS # BLD: 0 K/UL (ref 0–0.2)
BASOPHILS NFR BLD: 0.7 %
BILIRUB SERPL-MCNC: 0.7 MG/DL (ref 0–1)
BUN SERPL-MCNC: 16 MG/DL (ref 7–20)
CALCIUM SERPL-MCNC: 9.7 MG/DL (ref 8.3–10.6)
CHLORIDE SERPL-SCNC: 102 MMOL/L (ref 99–110)
CO2 SERPL-SCNC: 24 MMOL/L (ref 21–32)
CREAT SERPL-MCNC: 0.4 MG/DL (ref 0.9–1.3)
CRP SERPL-MCNC: <3 MG/L (ref 0–5.1)
DEPRECATED RDW RBC AUTO: 12.1 % (ref 12.4–15.4)
EOSINOPHIL # BLD: 0.1 K/UL (ref 0–0.6)
EOSINOPHIL NFR BLD: 1.3 %
ERYTHROCYTE [SEDIMENTATION RATE] IN BLOOD BY WESTERGREN METHOD: 7 MM/HR (ref 0–15)
GFR SERPLBLD CREATININE-BSD FMLA CKD-EPI: >90 ML/MIN/{1.73_M2}
GLUCOSE SERPL-MCNC: 272 MG/DL (ref 70–99)
HCT VFR BLD AUTO: 39.7 % (ref 40.5–52.5)
HGB BLD-MCNC: 13.6 G/DL (ref 13.5–17.5)
LYMPHOCYTES # BLD: 1.7 K/UL (ref 1–5.1)
LYMPHOCYTES NFR BLD: 38.3 %
MCH RBC QN AUTO: 29.4 PG (ref 26–34)
MCHC RBC AUTO-ENTMCNC: 34.4 G/DL (ref 31–36)
MCV RBC AUTO: 85.6 FL (ref 80–100)
MONOCYTES # BLD: 0.4 K/UL (ref 0–1.3)
MONOCYTES NFR BLD: 9.8 %
NEUTROPHILS # BLD: 2.2 K/UL (ref 1.7–7.7)
NEUTROPHILS NFR BLD: 49.9 %
PLATELET # BLD AUTO: 228 K/UL (ref 135–450)
PMV BLD AUTO: 7.8 FL (ref 5–10.5)
POTASSIUM SERPL-SCNC: 4.3 MMOL/L (ref 3.5–5.1)
PROT SERPL-MCNC: 6.8 G/DL (ref 6.4–8.2)
RBC # BLD AUTO: 4.64 M/UL (ref 4.2–5.9)
SODIUM SERPL-SCNC: 137 MMOL/L (ref 136–145)
WBC # BLD AUTO: 4.3 K/UL (ref 4–11)

## 2025-06-03 PROCEDURE — 86140 C-REACTIVE PROTEIN: CPT

## 2025-06-03 PROCEDURE — 85652 RBC SED RATE AUTOMATED: CPT

## 2025-06-03 PROCEDURE — 99211 OFF/OP EST MAY X REQ PHY/QHP: CPT

## 2025-06-03 PROCEDURE — 85025 COMPLETE CBC W/AUTO DIFF WBC: CPT

## 2025-06-03 PROCEDURE — 99213 OFFICE O/P EST LOW 20 MIN: CPT

## 2025-06-03 PROCEDURE — 36592 COLLECT BLOOD FROM PICC: CPT

## 2025-06-03 PROCEDURE — 80053 COMPREHEN METABOLIC PANEL: CPT

## 2025-06-03 RX ORDER — ONDANSETRON 2 MG/ML
8 INJECTION INTRAMUSCULAR; INTRAVENOUS
Status: CANCELLED | OUTPATIENT
Start: 2025-06-04

## 2025-06-03 RX ORDER — ACETAMINOPHEN 325 MG/1
650 TABLET ORAL
Status: CANCELLED | OUTPATIENT
Start: 2025-06-04

## 2025-06-03 RX ORDER — SODIUM CHLORIDE 0.9 % (FLUSH) 0.9 %
5-40 SYRINGE (ML) INJECTION PRN
Status: DISCONTINUED | OUTPATIENT
Start: 2025-06-03 | End: 2025-06-04 | Stop reason: HOSPADM

## 2025-06-03 RX ORDER — HYDROCORTISONE SODIUM SUCCINATE 100 MG/2ML
100 INJECTION INTRAMUSCULAR; INTRAVENOUS
Status: CANCELLED | OUTPATIENT
Start: 2025-06-04

## 2025-06-03 RX ORDER — ALBUTEROL SULFATE 90 UG/1
4 INHALANT RESPIRATORY (INHALATION) PRN
Status: CANCELLED | OUTPATIENT
Start: 2025-06-04

## 2025-06-03 RX ORDER — EPINEPHRINE 1 MG/ML
0.3 INJECTION, SOLUTION, CONCENTRATE INTRAVENOUS PRN
Status: CANCELLED | OUTPATIENT
Start: 2025-06-04

## 2025-06-03 RX ORDER — SODIUM CHLORIDE 0.9 % (FLUSH) 0.9 %
5-40 SYRINGE (ML) INJECTION PRN
Status: CANCELLED | OUTPATIENT
Start: 2025-06-04

## 2025-06-03 RX ORDER — DIPHENHYDRAMINE HYDROCHLORIDE 50 MG/ML
50 INJECTION, SOLUTION INTRAMUSCULAR; INTRAVENOUS
Status: CANCELLED | OUTPATIENT
Start: 2025-06-04

## 2025-06-03 RX ORDER — SODIUM CHLORIDE 9 MG/ML
INJECTION, SOLUTION INTRAVENOUS CONTINUOUS
Status: CANCELLED | OUTPATIENT
Start: 2025-06-04

## 2025-06-03 NOTE — TELEPHONE ENCOUNTER
OPAT End  Call made to pharmacy, spoke with Eagle, [facility], and gave VO to end IV ATB as scheduled on 6/4  Eagle GUILLEN correctly    Faxed order to [facility] to remove PICC after dose of Ertapenem on 6/4  Confirmation to be scanned to chart    Call made to patient.  Notified him we are ending the IV ATB after tomorrow's dose and I will fax order to [facility] for PICC removal  Pt verbalized understanding    Will f/u in 1-2 days to verify line removal    Picc was removed on 6/4 at [facility].  Note in Epic

## 2025-06-03 NOTE — PATIENT INSTRUCTIONS
- Dr. Royal Paredes is the diabetes doctor. His number is [phone]. Your PCP sent a referral to him.  If you do not hear from them, you can call to schedule a visit.     - Continue Metformin  mg daily with breakfast    - Increase Lantus to 48 units nightly  - if you continue to have glucose readings in the morning greater than 150, increase Lantus by 2 units every 2-3 days.     - Continue Humalog 12 units three times daily before meals.    - if you start having low glucose readings (< 70) after taking your meal time Humalog, then decrease by 1 units every 2 days.     - if you do not eat, do not take the Humalog 12 units  - if you do not eat, go ahead and take Humalog insulin per sliding scale if glucose is elevated.   - do not take Humalog more than once in 4 hours.     Sliding scale  Less than 150  No insulin  150-199   1 unit  200-249   2 units  250-299   3 units  300-349   4 units  350-400             5 units  Over 400             6 units    - Add 1 unit for every 15 grams of carb over 60    If you have low blood sugar of less than 70mg/dL, follow the \"15-15 rule\"

## 2025-06-03 NOTE — PROGRESS NOTES
Clinic Level of Care Assessment  Infusion Center      NAME:  Reilly Alexander  YOB: 2000 GENDER: male  MEDICAL RECORD NUMBER:  [id number]   DATE:  6/3/2025     Ambulation Status Document in Daily Care/Safety Tab   Status Definition Points   Independent Independently able to ambulate.  Fully able (without any assistance) to get on/off exam table/chair.  [x]   0   Minimal Physical Assistance Requires physical assistance of one person to ambulate and/or position patient to be examined. Includes necessary physical assistance to position lower extremities on/off stool. []   1   Moderate Physical Assistance Requires at least one staff member to physically assist patient in ambulating into treatment room, and/or on off chair/bed.  Requires assistance to bathroom. []   2   Full Assistance Requires assistance of at least two staff members to transfer patient into treatment room and/or on/off bed/chair. \"Total Transfer\".  Unable to use bathroom requires bedside commode and/or bedpan []   3       Dressing Complexity Document in LDA Navigator  Complexity Definition Points   No Dressing  []   0   Simple Minimal, simple dressing. i.e. bandaid, gauze, simple wrap.  Peripheral IV dressing. []   1   Intermediate Moderately complicated PICC dressing change requiring sterile procedure and site assessment. [x]   2   Complex Complicated dressing change port access requiring sterile procedure and site assessment.  []   3       Teaching Effort Document in AVS and/or Education Navigator    Effort Definition Points   No Teaching  []   0   Simple Teach two or less topics.  Teaching documented in discharge instructions in AVS and copy given to patient. [x]   1   Intermediate Teach three to four topics.  Teaching documented in Discharge Instructions in AVS using References and Attachments. Copy given to patient.   []   2   Complex Teach five to six topics. Teaching documented in Discharge Instructions in AVS using References and  Attachments. May also be documentation in Education Navigator.  Copy given to patient. []   3           Patient Assessment  Planning Definition Points   Simple Complete all tabs in patient assessment navigator.  Review or complete patient'sTherapy Plan.      [x]   1   Intermediate Complete all tabs in patient assessment navigator.  Review or completed patient'sTherapy Plan.  Contact doctor based on nursing assessment.     []   2   Complex Complete all tabs in patient assessment navigator.  Completed patient's Therapy Plan.  Contact doctor based on nursing assessment and write order related to needed care. []   3     Patient Planning   Planning Definition Points   Simple Discharged, instructions and After Visit Summary given to patient/caregiver and instructions completed.  Simple follow-up with routine assessment and planning.      [x]   1   Intermediate Discharged, instructions and After Visit Summary given to patient/caregiver and instructions completed.  Contact with outside resources; i.e. Telephone calls to PCP, home health, ECF and/or arranging transportation.         []   2   Complex Discharged, instructions and After Visit Summary given to patient/caregiver and instructions completed.  Contact with outside resources; i.e. Telephone calls to PCP, home health, ECF and/or arranging transportation.  May include filling out forms and/or writing letters, communication with insurance , preauthorization for treatment.   []   3       Is this the Patient's First Visit to the Infusion Center  No    Is this Patient Established @ [facility]  Yes              Clinical Level of Care      Points  0-2  Level 1 []     Points  3-5  Level 2 [x]     Points  6-9  Level 3 []     Points  10-12  Level 4 []     Points  13-15  Level 5 []       Electronically signed by Guadalupe Ruth RN on 6/3/2025 at 8:51 AM

## 2025-06-03 NOTE — PROGRESS NOTES
[facility]  Outpatient Wellness Center  Pharmacy  Diabetes Service    [address] [place], [place] [address]  Phone: [phone]  Fax: [phone]    NAME: Reilly Alexander  MEDICAL RECORD NUMBER:  [id number]  AGE: 24 y.o.   GENDER: male  : 2000  EPISODE DATE:  6/3/2025       Reilly Alexander was referred to the Wellness Center by Cynthia Paulino CNP  for Diabetes services.   Special Instructions per the Referral Include: Patient Education and Medication Management      ICD-10-CM    1. Type 2 diabetes mellitus with foot ulcer, with long-term current use of insulin ([facility])  E11.621     L97.509     Z79.4         Goals: A1C: < 8    Medication adjustments or recommendations will follow ADA guidelines unless otherwise specified on the referral.    Sylvain Grover is a 24 y.o. here for the Diabetes Service for self-management education, medication review including over the counter medications and herbal products, overall wellbeing assessment, transition of care and any needed adjustments with updates and recommendations communicated to the referring physician.  Reilly Alexander appears well and in no acute distress. Comes  using a scooter to keep weight off of his foot . Is here today alone for diabetes management. Reilly appears ready, willing and able to engage in self-management activities.     Pertinent findings in the office today:   Foot ulcerations, Denies polyuria, Denies polydipsia, Denies polyphagia, Denies vision changes, Denies fatigue, Denies symptoms of neuropathy, Denies nausea, Denies vomiting, Denies weight loss, and Symptoms of hypoglycemia \"head rush\" with a BG < 70    Episode of hypoglycemia yesterday. Woke up late and took extra insulin as his BG was elevated then dropped low.     Follows with wound care. Reports foot wound is healing.    Date of diabetes diagnosis: Greater than one year - diagnosed in 2019    Ongoing factors affecting care:   - States his mother has

## 2025-06-03 NOTE — PROGRESS NOTES
Outpatient Infusion Center   [facility]    PICC Dressing Change    NAME:  Reilly Alexander  YOB: 2000  MEDICAL RECORD NUMBER:  [id number]  DATE:  6/3/2025    Patient arrived to Outpatient Infusion Center   [] per wheelchair   [x] ambulatory     PICC Location:right arm    PICC Site:  Redness: No  Bruising: No   Edema: No  Pain: No     PICC Cleansed:  Current dressing and stat lock removed: Yes  Chloroprep Scrub for 30 seconds and air dried completely for 1 minute: Yes     PICC Dressing Change  Skin barrier: Yes  Stat lock applied and PICC line secured Yes  Biopatch applied: Yes   PICC site covered with Tegaderm Yes  Date and initials applied to PICC dressing Yes  [] Other:    Next Dressing Due: Tammy 10, 2025.     Response to treatment:  Well tolerated by patient.      Electronically signed by Guadalupe Ruth RN on 6/3/2025 at 8:47 [facility]   [facility]    PICC Lab Draw    NAME:  Reilly Alexander  YOB: 2000  MEDICAL RECORD NUMBER:  [id number]  DATE:  6/3/2025    Patient arrived to Outpatient Infusion Center   [] per wheelchair   [x] ambulatory     PICC Location:right arm    PICC Site:  Redness: No  Bruising: No   Edema: No  Pain: No     PICC Labs  Cap cleansed with Chloroprep Scrub for 30 seconds and air dried completely for 1 minute on sterile 4X4: Yes  Blood drawn using a 10/ml syringe  Amount of blood wasted 15/ml syringe    Labs Obtained: Yes  Lab Test(s) Ordered: CBC, CMP ,ESR, CPR  PICC Flushed with 20ml/NS: Yes  New Cap applied: Yes     Response to treatment:  Well tolerated by patient.      Electronically signed by Guadalupe Ruth RN on 6/3/2025 at 8:47 AM

## 2025-06-03 NOTE — TELEPHONE ENCOUNTER
OPAT Nurse Coordinator Weekly Update Note    Current OPAT plan:  Ertapenem 1 gm iv daily through 6/4/25    Diagnosis:  R DM foot infection / osteomyelitis     Assessment:  spoke with pt.  He is tolerating IV ATB without adverse SE.  Pt states the wound to the bottom of his foot is healed and closes, the wound more toward his heel is nearly closed and the one on the top of his foot near the toes is healing.  Pt is anxious to be finished with IV ATB.  Pt asked for Dr Bourne's number to be sent to him in a [employer] message ([phone]).    Follow up appts:  Dr Brown 6/5

## 2025-06-04 ENCOUNTER — HOSPITAL ENCOUNTER (OUTPATIENT)
Dept: INFUSION THERAPY | Age: 25
Setting detail: INFUSION SERIES
Discharge: HOME | End: 2025-06-04

## 2025-06-04 VITALS
RESPIRATION RATE: 16 BRPM | SYSTOLIC BLOOD PRESSURE: 110 MMHG | HEART RATE: 112 BPM | TEMPERATURE: 98.4 F | DIASTOLIC BLOOD PRESSURE: 76 MMHG | OXYGEN SATURATION: 100 %

## 2025-06-04 PROCEDURE — 99212 OFFICE O/P EST SF 10 MIN: CPT

## 2025-06-04 NOTE — PROGRESS NOTES
Outpatient Infusion Center   [facility]    PICC Line Removal    NAME:  Reilly Alexander  YOB: 2000  MEDICAL RECORD NUMBER:  [id number]  DATE:  6/4/2025    Patient arrived to Outpatient Infusion Center   [] per wheelchair   [x] ambulatory     Alert and oriented x 4. Administered last dose of IV antibiotics today. Order received to discontinue PICC. Patient continues with boot on right foot and receives treatment at the Wound center. States wound is almost healed.     PICC Location:  right arm    Patient has been treated for:  wound infection right foot  Patient has completed treatment of: 6  weeks of treatment     Order for removal of PICC line given by: Dr. Hill    PICC placed on:   April 22,2025.  PICC cut to 42 cm.    Patient positioned:   [] Lying   [x] Sitting   [] Other         PICC dressing removed:  Yes     New pair of sterile gloves donned and sterile 4 x 4 gauze placed under PICC line:  Yes    Keeping a sterile 4x4 over top of exit site, PICC removed slowly by small increments and line not stretched.      PICC line removed without any complications:  Yes    Pressure held to exit site for 30 - 60 seconds:  Yes    Bleeding at site:  no    Site scrubbed with Chloroprep Scrub for 30 seconds and air dried completely for 1 minute.  Yes  Small piece of vaseline gauze placed over exit site and covered with a dry 2 x 2 gauze and covered with a tegaderm dressing. Yes       Patient instructed:  Keep dressing dry and intact for 48 hours.   No heavy lifting with right arm x 48 hours  If bleeding occurs, apply pressure to site x 5 minutes and elevate arm, if bleeding continues got to the emergency room.  Call  for any swelling, redness, red streaks, drainage at PICC insertion site      Response to treatment:  Well tolerated by patient.      Electronically signed by EMORY BOOGIE RN on 6/4/2025 at 12:55 PM  
Attachments. May also be documentation in Education Navigator.  Copy given to patient. []   3           Patient Assessment  Planning Definition Points   Simple Complete all tabs in patient assessment navigator.  Review or complete patient'sTherapy Plan.      [x]   1   Intermediate Complete all tabs in patient assessment navigator.  Review or completed patient'sTherapy Plan.  Contact doctor based on nursing assessment.     []   2   Complex Complete all tabs in patient assessment navigator.  Completed patient's Therapy Plan.  Contact doctor based on nursing assessment and write order related to needed care. []   3     Patient Planning   Planning Definition Points   Simple Discharged, instructions and After Visit Summary given to patient/caregiver and instructions completed.  Simple follow-up with routine assessment and planning.      [x]   1   Intermediate Discharged, instructions and After Visit Summary given to patient/caregiver and instructions completed.  Contact with outside resources; i.e. Telephone calls to PCP, home health, ECF and/or arranging transportation.         []   2   Complex Discharged, instructions and After Visit Summary given to patient/caregiver and instructions completed.  Contact with outside resources; i.e. Telephone calls to PCP, home health, ECF and/or arranging transportation.  May include filling out forms and/or writing letters, communication with insurance , preauthorization for treatment.   []   3       Is this the Patient's First Visit to the Infusion Center  No    Is this Patient Established @ [facility]  Yes              Clinical Level of Care      Points  0-2  Level 1 []     Points  3-5  Level 2 [x]     Points  6-9  Level 3 []     Points  10-12  Level 4 []     Points  13-15  Level 5 []       Electronically signed by EMORY BOOGIE RN on 6/4/2025 at 2:48 PM

## 2025-06-04 NOTE — DISCHARGE INSTRUCTIONS
Outpatient Infusion Discharge Instructions  [facility]  [address] [place], [place] [address]  Telephone: [phone]      FAX [phone]    NAME:  Reilly Alexander  YOB: 2000  MEDICAL RECORD NUMBER:  [id number]  DATE:  6/4/25    Reason for Outpatient Infusion Visit: D/C PICC    If you develop any these symptoms please contact you Doctor    [] Nausea and/or vomiting not relieved with medication   [x] Swelling, redness, and/or bleeding at injection or IV site    [] Fever or chills  [] Rash or itching   [] Shortness of breath  [x] Please review After Visit Summary (AVS) information on  PICC removal  [] Other    Patient instructed:  Keep dressing dry and intact for 48 hours.   No heavy lifting with right arm x 48 hours  If bleeding occurs, apply pressure to site x 5 minutes and elevate arm, if bleeding continues got to the emergency room.  Call  for any swelling, redness, red streaks, drainage at PICC insertion site      Outpatient Infusion Center Information: Should you experience any significant changes in your health or have questions about your care please contact the [facility] [place] at [phone] MONDAY - FRIDAY 8:00 am - 4:00 pm.  If you need help outside these hours and cannot wait until we are again available, contact your Primary Care Physician or go to the hospital emergency room.       Electronically signed by EMORY BOOGIE RN on 6/4/2025 at 12:56 PM

## 2025-06-05 ENCOUNTER — HOSPITAL ENCOUNTER (OUTPATIENT)
Dept: WOUND CARE | Age: 25
Discharge: HOME | End: 2025-06-05
Attending: NURSE PRACTITIONER

## 2025-06-05 VITALS
DIASTOLIC BLOOD PRESSURE: 80 MMHG | TEMPERATURE: 98.1 F | SYSTOLIC BLOOD PRESSURE: 109 MMHG | RESPIRATION RATE: 16 BRPM | HEART RATE: 105 BPM

## 2025-06-05 DIAGNOSIS — M79.89 LEG SWELLING: ICD-10-CM

## 2025-06-05 DIAGNOSIS — E11.621 DIABETIC ULCER OF RIGHT MIDFOOT ASSOCIATED WITH TYPE 2 DIABETES MELLITUS, WITH NECROSIS OF MUSCLE (HCC): Primary | ICD-10-CM

## 2025-06-05 DIAGNOSIS — L97.413 DIABETIC ULCER OF RIGHT MIDFOOT ASSOCIATED WITH TYPE 2 DIABETES MELLITUS, WITH NECROSIS OF MUSCLE (HCC): Primary | ICD-10-CM

## 2025-06-05 PROCEDURE — 11043 DBRDMT MUSC&/FSCA 1ST 20/<: CPT

## 2025-06-05 RX ORDER — LIDOCAINE HYDROCHLORIDE 20 MG/ML
JELLY TOPICAL PRN
OUTPATIENT
Start: 2025-06-05

## 2025-06-05 RX ORDER — LIDOCAINE HYDROCHLORIDE 40 MG/ML
SOLUTION TOPICAL PRN
Status: DISCONTINUED | OUTPATIENT
Start: 2025-06-05 | End: 2025-06-06 | Stop reason: HOSPADM

## 2025-06-05 RX ORDER — NEOMYCIN/BACITRACIN/POLYMYXINB 3.5-400-5K
OINTMENT (GRAM) TOPICAL PRN
OUTPATIENT
Start: 2025-06-05

## 2025-06-05 RX ORDER — LIDOCAINE HYDROCHLORIDE 40 MG/ML
SOLUTION TOPICAL PRN
OUTPATIENT
Start: 2025-06-05

## 2025-06-05 RX ORDER — BACITRACIN ZINC AND POLYMYXIN B SULFATE 500; 1000 [USP'U]/G; [USP'U]/G
OINTMENT TOPICAL PRN
OUTPATIENT
Start: 2025-06-05

## 2025-06-05 RX ORDER — GINSENG 100 MG
CAPSULE ORAL PRN
OUTPATIENT
Start: 2025-06-05

## 2025-06-05 RX ORDER — MUPIROCIN 20 MG/G
OINTMENT TOPICAL PRN
OUTPATIENT
Start: 2025-06-05

## 2025-06-05 RX ORDER — SODIUM CHLOR/HYPOCHLOROUS ACID 0.033 %
SOLUTION, IRRIGATION IRRIGATION PRN
OUTPATIENT
Start: 2025-06-05

## 2025-06-05 RX ORDER — CLOBETASOL PROPIONATE 0.5 MG/G
OINTMENT TOPICAL PRN
OUTPATIENT
Start: 2025-06-05

## 2025-06-05 RX ORDER — LIDOCAINE 50 MG/G
OINTMENT TOPICAL PRN
OUTPATIENT
Start: 2025-06-05

## 2025-06-05 RX ORDER — BETAMETHASONE DIPROPIONATE 0.5 MG/G
CREAM TOPICAL PRN
OUTPATIENT
Start: 2025-06-05

## 2025-06-05 RX ORDER — TRIAMCINOLONE ACETONIDE 1 MG/G
OINTMENT TOPICAL PRN
OUTPATIENT
Start: 2025-06-05

## 2025-06-05 RX ORDER — LIDOCAINE 40 MG/G
CREAM TOPICAL PRN
OUTPATIENT
Start: 2025-06-05

## 2025-06-05 RX ORDER — GENTAMICIN SULFATE 1 MG/G
OINTMENT TOPICAL PRN
OUTPATIENT
Start: 2025-06-05

## 2025-06-05 RX ADMIN — LIDOCAINE HYDROCHLORIDE: 40 SOLUTION TOPICAL at 09:06

## 2025-06-05 ASSESSMENT — PAIN SCALES - GENERAL: PAINLEVEL_OUTOF10: 0

## 2025-06-05 NOTE — PROGRESS NOTES
developed and well- nourished, in no acute distress  Skin: warm and dry; diabetic foot ulcer Wilhelm grade 3 with exposed muscle fascia. Nonpressure wound.  Wound red with nonviable tissue and exposed tendon.  Debridement performed.  Will switch to Hydrofera Blue Transfer to help bring wounds to closure.  Patient is wearing surgical shoe for proper offloading of right foot, but it is causing pressure on his right lateral foot.  Will apply a podiatry pad to the wound.  He was taken off of IV antibiotics per ID.  Head: normocephalic and atraumatic  Eyes: pupils equal, round, and reactive to light, extraocular eye movements intact, conjunctivae normal  Neck: supple  Pulmonary/Chest: clear to auscultation bilaterally- no wheezes, rales or rhonchi, normal air movement, no respiratory distress  Cardiovascular: normal rate, regular rhythm, no acute distress  Extremities: no cyanosis or clubbing; +1 pitting edema  Musculoskeletal: normal range of motion, no joint swelling, deformity or tenderness.  Nonweightbearing to right foot per Dr. Bourne  Neurologic: reflexes normal and symmetric, and speech normal      Assessment:        Problem List Items Addressed This Visit       Diabetic ulcer of right midfoot associated with type 2 diabetes mellitus, with necrosis of muscle (HCC) - Primary    Relevant Medications    lidocaine (XYLOCAINE) 4 % external solution    Other Relevant Orders    MD COMMUNICATION 1    MD COMMUNICATION 2    Initiate Outpatient Wound Care Protocol    Initiate Oxygen Therapy Protocol    Leg swelling    Relevant Medications    lidocaine (XYLOCAINE) 4 % external solution    Other Relevant Orders    MD COMMUNICATION 1    MD COMMUNICATION 2    Initiate Outpatient Wound Care Protocol    Initiate Oxygen Therapy Protocol        Procedure Note  Indications:  Based on my examination of this patient's wound(s)/ulcer(s) today, debridement is required to promote healing and evaluate the wound base.    Performed by: Ryan

## 2025-06-05 NOTE — PATIENT INSTRUCTIONS
TIMES.  ___________________________________________________________________    Wound Location: Right  Foot    WOUND CLEANSING:Do NOT Scrub Wound   Normal Saline VASHE at clinic only     Do not get dressing or wound wet in shower.    Grace-Wound Topical Treatments:   Apply immediately around the wound: Apply ZINC CREAM to skin surrounding the wound.    PRIMARY DRESSING: Hydrofera Blue Transfer, cut to size, (make slightly larger than wound)  to top of foot wounds    SECONDARY DRESSING: oval podiatry pad to right alteral foot callous- (keep on all week), 4X4 non woven gauze pad and Kerlix/Rolled Gauze                          Surgical shoe   Ace wrap    Dressing Frequency:TUESDAY, THURSDAY, AND SATURDAY   ___________________________________________________________________________________________    ____________________________________________________________________________________________  Negative Pressure:   N/A  _____________________________________________________________________________________________   Pressure Relief and Off Loading:  N/A  Specialty equipment ordered:         []Wheelchair cushion            [] Specialty Bed/Mattress  ______________________________________________________________________________________________                       [x]Activity: Avoid weight bearing to right lower leg                    [x] Assistive Devices   Knee scooter    Use as instructed by the provider     ____________________________________________________________________________________________     Dietary: Continue your diet as tolerated.  Important dietary reminders:  1. Increase Protein intake (i.e. Lean meats, fish, eggs, legumes, and yogurt).   2. Limit Sodium, Alcohol and Sugar.   3. If diabetic, follow a diabetic diet and check glucose prior to meals or as instructed by your physician.     May choose one of the Suggested Dietary Supplements below:   Shai       30ml ProStat  EnsureEnlive   Ensure

## 2025-06-12 ENCOUNTER — HOSPITAL ENCOUNTER (OUTPATIENT)
Dept: WOUND CARE | Age: 25
Discharge: HOME | End: 2025-06-12
Attending: NURSE PRACTITIONER

## 2025-06-12 VITALS
TEMPERATURE: 98.6 F | SYSTOLIC BLOOD PRESSURE: 129 MMHG | DIASTOLIC BLOOD PRESSURE: 76 MMHG | RESPIRATION RATE: 16 BRPM | HEART RATE: 108 BPM

## 2025-06-12 DIAGNOSIS — L97.413 DIABETIC ULCER OF RIGHT MIDFOOT ASSOCIATED WITH TYPE 2 DIABETES MELLITUS, WITH NECROSIS OF MUSCLE (HCC): Primary | ICD-10-CM

## 2025-06-12 DIAGNOSIS — E11.621 DIABETIC ULCER OF RIGHT MIDFOOT ASSOCIATED WITH TYPE 2 DIABETES MELLITUS, WITH NECROSIS OF MUSCLE (HCC): Primary | ICD-10-CM

## 2025-06-12 DIAGNOSIS — M79.89 LEG SWELLING: ICD-10-CM

## 2025-06-12 PROCEDURE — 11042 DBRDMT SUBQ TIS 1ST 20SQCM/<: CPT | Performed by: NURSE PRACTITIONER

## 2025-06-12 PROCEDURE — 11042 DBRDMT SUBQ TIS 1ST 20SQCM/<: CPT

## 2025-06-12 RX ORDER — LIDOCAINE HYDROCHLORIDE 40 MG/ML
SOLUTION TOPICAL PRN
Status: DISCONTINUED | OUTPATIENT
Start: 2025-06-12 | End: 2025-06-13 | Stop reason: HOSPADM

## 2025-06-12 RX ORDER — LIDOCAINE HYDROCHLORIDE 20 MG/ML
JELLY TOPICAL PRN
OUTPATIENT
Start: 2025-06-12

## 2025-06-12 RX ORDER — MUPIROCIN 2 %
OINTMENT (GRAM) TOPICAL PRN
OUTPATIENT
Start: 2025-06-12

## 2025-06-12 RX ORDER — LIDOCAINE 40 MG/G
CREAM TOPICAL PRN
OUTPATIENT
Start: 2025-06-12

## 2025-06-12 RX ORDER — CLOBETASOL PROPIONATE 0.5 MG/G
OINTMENT TOPICAL PRN
OUTPATIENT
Start: 2025-06-12

## 2025-06-12 RX ORDER — LIDOCAINE 50 MG/G
OINTMENT TOPICAL PRN
OUTPATIENT
Start: 2025-06-12

## 2025-06-12 RX ORDER — GINSENG 100 MG
CAPSULE ORAL PRN
OUTPATIENT
Start: 2025-06-12

## 2025-06-12 RX ORDER — SODIUM CHLOR/HYPOCHLOROUS ACID 0.033 %
SOLUTION, IRRIGATION IRRIGATION PRN
OUTPATIENT
Start: 2025-06-12

## 2025-06-12 RX ORDER — BACITRACIN ZINC AND POLYMYXIN B SULFATE 500; 1000 [USP'U]/G; [USP'U]/G
OINTMENT TOPICAL PRN
OUTPATIENT
Start: 2025-06-12

## 2025-06-12 RX ORDER — BETAMETHASONE DIPROPIONATE 0.5 MG/G
CREAM TOPICAL PRN
OUTPATIENT
Start: 2025-06-12

## 2025-06-12 RX ORDER — LIDOCAINE HYDROCHLORIDE 40 MG/ML
SOLUTION TOPICAL PRN
OUTPATIENT
Start: 2025-06-12

## 2025-06-12 RX ORDER — NEOMYCIN/BACITRACIN/POLYMYXINB 3.5-400-5K
OINTMENT (GRAM) TOPICAL PRN
OUTPATIENT
Start: 2025-06-12

## 2025-06-12 RX ORDER — TRIAMCINOLONE ACETONIDE 1 MG/G
OINTMENT TOPICAL PRN
OUTPATIENT
Start: 2025-06-12

## 2025-06-12 RX ORDER — GENTAMICIN SULFATE 1 MG/G
OINTMENT TOPICAL PRN
OUTPATIENT
Start: 2025-06-12

## 2025-06-12 RX ADMIN — LIDOCAINE HYDROCHLORIDE 2.5 ML: 40 SOLUTION TOPICAL at 09:52

## 2025-06-12 ASSESSMENT — PAIN SCALES - GENERAL: PAINLEVEL_OUTOF10: 0

## 2025-06-12 NOTE — PROGRESS NOTES
[facility]   Progress Note and Procedure Note      Reilly Alexander  MEDICAL RECORD NUMBER:  [id number]  AGE: 24 y.o.   GENDER: male  : 2000  EPISODE DATE:  2025    Subjective:     Chief Complaint   Patient presents with    Wound Check     F/u visit - right foot wound         HISTORY of PRESENT ILLNESS HPI     Reilly Alexander is a 24 y.o. male who presents today for wound/ulcer evaluation.   History of Wound Context: Diabetic foot ulcer of right lateral foot.  Patient presented to the hospital on 25 with DKA and admitted to ICU.  A1C was 11.6.  Status post I&D on ,  and  by Dr. Bourne; exposed tendon has been surgically debrided.  Patient is still receiving IV Invanz via a right basilic PICC per infusion center/home health.    25:  Patient started metformin today, along with his insulin.  He had not eaten breakfast.  Upon arrival, BP was 103/81 and .  He wasn't feeling well post-debridement.  Was given costa crackers.  Repeat BP 97/63, .  Was given additional costa crackers with peanut butter and cranberry juice.  Repeat FBS was 109, then 5 minutes later, dropped to 100, then 98.  Another 5 minutes, repeat FBS was 102.  After 10 minutes, FBS was 136.  Wound has improved since last week, less tendon exposure.      5/15/25:  Wound continues to improve.      25: Discussed at length the importance of proper footcare and offloading of diabetic foot ulcers.  Patient arrived today with right foot sandal without offloading stating that he was using a cane today instead of his knee scooter to maintain nonweightbearing status to right foot ordered per Dr. Bourne.  Patient encouraged to schedule follow-up appointment with Dr. Bourne as he has not followed up since surgical procedure.  Zinc oxide added to periwound skin due to increased maceration. Surgical shoe provided for offloading.  Patient remains on IV antibiotics via PICC line per ID.

## 2025-06-12 NOTE — PATIENT INSTRUCTIONS
[facility] Wound Care Physician Orders and Discharge Instructions  [address], [address]        [place], [place] [address]  Telephone: [phone]      FAX [phone]  MONDAY - THURSDAY 8:30 am - 4:30 pm and Friday 8:30 am - 11:30 am      NAME:  Reilly Alexander  YOB: 2000  MEDICAL RECORD NUMBER:  [id number]  DATE:  6/12/2025      Return Appointment:  [x] Return Appointment: With Ryan Brown CNP  in  1  Week(s)             [] Nurse Visit for Wound Assessment:  [] DME/Wound Dressing Supplies Provided by: Other n/a  WILL HAVE TO PURCHASE SUPPLIES THROUGH [employer], OR AT YOUR LOCAL STORE LIKE [employer]   Please call them directly to reorder supplies when you run out)  [] ECF or Home Healthcare:  Other: n/a  Your Home Care Agency is responsible to order your supplies.   [] Orders placed during your visit:      Please purchase Zinc Oxide or Desitin cream:$15-30 Dollars     If looking for Zinc Oxide, it has several percentages, we recommended 20%- 40%    MAY PURCHASE OVER THE COUNTER DESITIN         Please call to schedule an appointment:   Dr Bourne   [address], [address], [place], [place] [address] [phone]     **ANTIBIOTICS PER DR CASTANEDA**     Important Reminders:   Please wash hands with soap and water before and after every dressing change.  If you smoke we ask that you refrain from smoking. Smoking inhibits wounds from healing.  When taking antibiotics take the entire prescription as ordered. Do not stop taking until medication is all gone unless otherwise instructed.   Do not get wounds wet in the bath or shower unless otherwise instructed by your physician. If your wound is on your foot or leg, you may purchase a cast bag. Please ask at your local pharmacy.   IF YOU ARE UNABLE TO OBTAIN WOUND SUPPLIES, CONTINUE TO USE THE SUPPLIES YOU HAVE AVAILABLE UNTIL YOU ARE ABLE TO REACH US. IT IS MOST IMPORTANT TO KEEP THE WOUND COVERED AT ALL

## 2025-06-16 ENCOUNTER — PHARMACY VISIT (OUTPATIENT)
Dept: PHARMACY | Age: 25
End: 2025-06-16

## 2025-06-16 DIAGNOSIS — L97.509 TYPE 2 DIABETES MELLITUS WITH FOOT ULCER, WITH LONG-TERM CURRENT USE OF INSULIN (HCC): Primary | ICD-10-CM

## 2025-06-16 DIAGNOSIS — E11.621 TYPE 2 DIABETES MELLITUS WITH FOOT ULCER, WITH LONG-TERM CURRENT USE OF INSULIN (HCC): Primary | ICD-10-CM

## 2025-06-16 DIAGNOSIS — Z79.4 TYPE 2 DIABETES MELLITUS WITH FOOT ULCER, WITH LONG-TERM CURRENT USE OF INSULIN (HCC): Primary | ICD-10-CM

## 2025-06-16 NOTE — PATIENT INSTRUCTIONS
- Continue Lantus 48 units nightly  - if you continue to have glucose readings in the morning greater than 150, increase Lantus by 2 units every 2-3 days. If you start running < 80 then decrease Lantus by 2 units every 2-3 days.     - Decrease Humalog to 11 units three times daily before meals.    - if you start having low glucose readings (< 70) after taking your meal time Humalog, then decrease by 1 units every 2 days.   - you can adjust for larger or smaller meals and carbohydrate intake as well.     - if you do not eat, do not take the Humalog 11 units  - if you do not eat, go ahead and take Humalog insulin per sliding scale if glucose is elevated.   - do not take Humalog more than once in 4 hours.     Sliding scale  Less than 150  No insulin  150-199   1 unit  200-249   2 units  250-299   3 units  300-349   4 units  350-400             5 units  Over 400             6 units

## 2025-06-16 NOTE — PROGRESS NOTES
[facility]  Outpatient Wellness Center  Pharmacy  Diabetes Service    [address], [place] [address]  Phone: [phone]  Fax: [phone]    NAME: Reilly Alexander  MEDICAL RECORD NUMBER:  [id number]  AGE: 25 y.o.   GENDER: male  : 2000  EPISODE DATE:  2025       Reilly Alexander was referred to the Wellness Center by Cynthia Paulino CNP  for Diabetes services.   Special Instructions per the Referral Include: Patient Education and Medication Management      ICD-10-CM    1. Type 2 diabetes mellitus with foot ulcer, with long-term current use of insulin ([facility])  E11.621     L97.509     Z79.4         Goals: A1C: < 8    Medication adjustments or recommendations will follow ADA guidelines unless otherwise specified on the referral.    Sylvain Grover is a 25 y.o. here for the Diabetes Service for self-management education, medication review including over the counter medications and herbal products, overall wellbeing assessment, transition of care and any needed adjustments with updates and recommendations communicated to the referring physician.  Reilly Alexander appears well and in no acute distress. Comes using a scooter to keep weight off of his foot. Is here today alone for diabetes management. Reilly appears ready, willing and able to engage in self-management activities.     Pertinent findings in the office today:   Foot ulcerations, Denies polyuria, Denies polydipsia, Denies polyphagia, Denies vision changes, Denies fatigue, Denies symptoms of neuropathy, Denies nausea, Denies vomiting, Denies weight loss, and Symptoms of hypoglycemia \"head rush\" with a BG < 70    Episode of hypoglycemia one night.     Follows with wound care. Reports foot wound is continuing to heal. Expected to be healed in 2-3 weeks.     Date of diabetes diagnosis: Greater than one year - diagnosed in 2019    Ongoing factors affecting care:   - States his mother has diabetes, but is unsure if Type I

## 2025-06-19 ENCOUNTER — HOSPITAL ENCOUNTER (OUTPATIENT)
Dept: WOUND CARE | Age: 25
Discharge: HOME | End: 2025-06-19
Attending: NURSE PRACTITIONER

## 2025-06-19 VITALS
RESPIRATION RATE: 16 BRPM | DIASTOLIC BLOOD PRESSURE: 69 MMHG | HEART RATE: 113 BPM | TEMPERATURE: 98.1 F | SYSTOLIC BLOOD PRESSURE: 112 MMHG

## 2025-06-19 DIAGNOSIS — L97.413 DIABETIC ULCER OF RIGHT MIDFOOT ASSOCIATED WITH TYPE 2 DIABETES MELLITUS, WITH NECROSIS OF MUSCLE (HCC): Primary | ICD-10-CM

## 2025-06-19 DIAGNOSIS — M79.89 LEG SWELLING: ICD-10-CM

## 2025-06-19 DIAGNOSIS — E11.621 DIABETIC ULCER OF RIGHT MIDFOOT ASSOCIATED WITH TYPE 2 DIABETES MELLITUS, WITH NECROSIS OF MUSCLE (HCC): Primary | ICD-10-CM

## 2025-06-19 RX ORDER — LIDOCAINE HYDROCHLORIDE 40 MG/ML
SOLUTION TOPICAL PRN
Status: DISCONTINUED | OUTPATIENT
Start: 2025-06-19 | End: 2025-06-20 | Stop reason: HOSPADM

## 2025-06-19 RX ORDER — GINSENG 100 MG
CAPSULE ORAL PRN
OUTPATIENT
Start: 2025-06-19

## 2025-06-19 RX ORDER — TRIAMCINOLONE ACETONIDE 1 MG/G
OINTMENT TOPICAL PRN
OUTPATIENT
Start: 2025-06-19

## 2025-06-19 RX ORDER — MUPIROCIN 2 %
OINTMENT (GRAM) TOPICAL PRN
OUTPATIENT
Start: 2025-06-19

## 2025-06-19 RX ORDER — LIDOCAINE HYDROCHLORIDE 20 MG/ML
JELLY TOPICAL PRN
OUTPATIENT
Start: 2025-06-19

## 2025-06-19 RX ORDER — GENTAMICIN SULFATE 1 MG/G
OINTMENT TOPICAL PRN
OUTPATIENT
Start: 2025-06-19

## 2025-06-19 RX ORDER — MUPIROCIN 2 %
OINTMENT (GRAM) TOPICAL
Qty: 30 G | Refills: 1 | Status: SHIPPED | OUTPATIENT
Start: 2025-06-19 | End: 2025-06-26

## 2025-06-19 RX ORDER — LIDOCAINE 50 MG/G
OINTMENT TOPICAL PRN
OUTPATIENT
Start: 2025-06-19

## 2025-06-19 RX ORDER — BACITRACIN ZINC AND POLYMYXIN B SULFATE 500; 1000 [USP'U]/G; [USP'U]/G
OINTMENT TOPICAL PRN
OUTPATIENT
Start: 2025-06-19

## 2025-06-19 RX ORDER — LIDOCAINE 40 MG/G
CREAM TOPICAL PRN
OUTPATIENT
Start: 2025-06-19

## 2025-06-19 RX ORDER — LIDOCAINE HYDROCHLORIDE 40 MG/ML
SOLUTION TOPICAL PRN
OUTPATIENT
Start: 2025-06-19

## 2025-06-19 RX ORDER — SODIUM CHLOR/HYPOCHLOROUS ACID 0.033 %
SOLUTION, IRRIGATION IRRIGATION PRN
OUTPATIENT
Start: 2025-06-19

## 2025-06-19 RX ORDER — NEOMYCIN/BACITRACIN/POLYMYXINB 3.5-400-5K
OINTMENT (GRAM) TOPICAL PRN
OUTPATIENT
Start: 2025-06-19

## 2025-06-19 RX ORDER — BETAMETHASONE DIPROPIONATE 0.5 MG/G
CREAM TOPICAL PRN
OUTPATIENT
Start: 2025-06-19

## 2025-06-19 RX ORDER — CLOBETASOL PROPIONATE 0.5 MG/G
OINTMENT TOPICAL PRN
OUTPATIENT
Start: 2025-06-19

## 2025-06-19 RX ADMIN — LIDOCAINE HYDROCHLORIDE: 40 SOLUTION TOPICAL at 09:30

## 2025-06-19 ASSESSMENT — PAIN SCALES - GENERAL: PAINLEVEL_OUTOF10: 0

## 2025-06-19 NOTE — PROGRESS NOTES
[facility]   Progress Note and Procedure Note      Reilly Alexander  MEDICAL RECORD NUMBER:  [id number]  AGE: 24 y.o.   GENDER: male  : 2000  EPISODE DATE:  2025    Subjective:     Chief Complaint   Patient presents with    Wound Check     Follow up visit for right foot wound         HISTORY of PRESENT ILLNESS HPI     Reilly Alexander is a 24 y.o. male who presents today for wound/ulcer evaluation.   History of Wound Context: Diabetic foot ulcer of right lateral foot.  Patient presented to the hospital on 25 with DKA and admitted to ICU.  A1C was 11.6.  Status post I&D on ,  and  by Dr. Bourne; exposed tendon has been surgically debrided.  Patient is still receiving IV Invanz via a right basilic PICC per infusion center/home health.    25:  Patient started metformin today, along with his insulin.  He had not eaten breakfast.  Upon arrival, BP was 103/81 and .  He wasn't feeling well post-debridement.  Was given costa crackers.  Repeat BP 97/63, .  Was given additional costa crackers with peanut butter and cranberry juice.  Repeat FBS was 109, then 5 minutes later, dropped to 100, then 98.  Another 5 minutes, repeat FBS was 102.  After 10 minutes, FBS was 136.  Wound has improved since last week, less tendon exposure.      5/15/25:  Wound continues to improve.      25: Discussed at length the importance of proper footcare and offloading of diabetic foot ulcers.  Patient arrived today with right foot sandal without offloading stating that he was using a cane today instead of his knee scooter to maintain nonweightbearing status to right foot ordered per Dr. Bourne.  Patient encouraged to schedule follow-up appointment with Dr. Bourne as he has not followed up since surgical procedure.  Zinc oxide added to periwound skin due to increased maceration. Surgical shoe provided for offloading.  Patient remains on IV antibiotics via PICC line per

## 2025-06-19 NOTE — PATIENT INSTRUCTIONS
[facility] [place] Wound Care Physician Orders and Discharge Instructions  [address], [address]        [place], [place] [address]  Telephone: [phone]      FAX [phone]  MONDAY - THURSDAY 8:30 am - 4:30 pm and Friday 8:30 am - 11:30 am      NAME:  Reilly Alexander  YOB: 2000  MEDICAL RECORD NUMBER:  [id number]  DATE:  6/19/2025      Return Appointment:  [x] Return Appointment: With Ryan Brown CNP  in  1  Week(s)             [] Nurse Visit for Wound Assessment:  [] DME/Wound Dressing Supplies Provided by: Other n/a WILL HAVE TO PURCHASE SUPPLIES THROUGH [employer], OR AT YOUR LOCAL STORE LIKE [employer]   Please call them directly to reorder supplies when you run out)  [] ECF or Home Healthcare:  Other: n/a  Your Home Care Agency is responsible to order your supplies.   [x] Orders placed during your visit:      Please  Mupirocin at [facility]         Please call to schedule an appointment:   Dr Bourne   [address], [address], [place] [address]   [phone]     **ANTIBIOTICS PER DR CASTANEDA**     Important Reminders:   Please wash hands with soap and water before and after every dressing change.  If you smoke we ask that you refrain from smoking. Smoking inhibits wounds from healing.  When taking antibiotics take the entire prescription as ordered. Do not stop taking until medication is all gone unless otherwise instructed.   Do not get wounds wet in the bath or shower unless otherwise instructed by your physician. If your wound is on your foot or leg, you may purchase a cast bag. Please ask at your local pharmacy.   IF YOU ARE UNABLE TO OBTAIN WOUND SUPPLIES, CONTINUE TO USE THE SUPPLIES YOU HAVE AVAILABLE UNTIL YOU ARE ABLE TO REACH US. IT IS MOST IMPORTANT TO KEEP THE WOUND COVERED AT ALL TIMES.  ___________________________________________________________________    Wound Location: Right  Foot    WOUND CLEANSING:Do NOT Scrub Wound

## 2025-06-26 ENCOUNTER — HOSPITAL ENCOUNTER (OUTPATIENT)
Dept: WOUND CARE | Age: 25
Discharge: HOME | End: 2025-06-26
Attending: NURSE PRACTITIONER

## 2025-06-26 VITALS
DIASTOLIC BLOOD PRESSURE: 83 MMHG | SYSTOLIC BLOOD PRESSURE: 113 MMHG | TEMPERATURE: 97.2 F | RESPIRATION RATE: 16 BRPM | HEART RATE: 112 BPM

## 2025-06-26 DIAGNOSIS — E11.621 DIABETIC ULCER OF RIGHT MIDFOOT ASSOCIATED WITH TYPE 2 DIABETES MELLITUS, WITH NECROSIS OF MUSCLE (HCC): Primary | ICD-10-CM

## 2025-06-26 DIAGNOSIS — L97.413 DIABETIC ULCER OF RIGHT MIDFOOT ASSOCIATED WITH TYPE 2 DIABETES MELLITUS, WITH NECROSIS OF MUSCLE (HCC): Primary | ICD-10-CM

## 2025-06-26 DIAGNOSIS — M79.89 LEG SWELLING: ICD-10-CM

## 2025-06-26 PROCEDURE — 11042 DBRDMT SUBQ TIS 1ST 20SQCM/<: CPT | Performed by: NURSE PRACTITIONER

## 2025-06-26 PROCEDURE — 11042 DBRDMT SUBQ TIS 1ST 20SQCM/<: CPT

## 2025-06-26 RX ORDER — NEOMYCIN/BACITRACIN/POLYMYXINB 3.5-400-5K
OINTMENT (GRAM) TOPICAL PRN
OUTPATIENT
Start: 2025-06-26

## 2025-06-26 RX ORDER — TRIAMCINOLONE ACETONIDE 1 MG/G
OINTMENT TOPICAL PRN
OUTPATIENT
Start: 2025-06-26

## 2025-06-26 RX ORDER — MUPIROCIN 2 %
OINTMENT (GRAM) TOPICAL PRN
OUTPATIENT
Start: 2025-06-26

## 2025-06-26 RX ORDER — SODIUM CHLOR/HYPOCHLOROUS ACID 0.033 %
SOLUTION, IRRIGATION IRRIGATION PRN
OUTPATIENT
Start: 2025-06-26

## 2025-06-26 RX ORDER — LIDOCAINE 40 MG/G
CREAM TOPICAL PRN
OUTPATIENT
Start: 2025-06-26

## 2025-06-26 RX ORDER — CLOBETASOL PROPIONATE 0.5 MG/G
OINTMENT TOPICAL PRN
OUTPATIENT
Start: 2025-06-26

## 2025-06-26 RX ORDER — BETAMETHASONE DIPROPIONATE 0.5 MG/G
CREAM TOPICAL PRN
OUTPATIENT
Start: 2025-06-26

## 2025-06-26 RX ORDER — LIDOCAINE 50 MG/G
OINTMENT TOPICAL PRN
OUTPATIENT
Start: 2025-06-26

## 2025-06-26 RX ORDER — LIDOCAINE HYDROCHLORIDE 40 MG/ML
SOLUTION TOPICAL PRN
OUTPATIENT
Start: 2025-06-26

## 2025-06-26 RX ORDER — GINSENG 100 MG
CAPSULE ORAL PRN
OUTPATIENT
Start: 2025-06-26

## 2025-06-26 RX ORDER — LIDOCAINE HYDROCHLORIDE 20 MG/ML
JELLY TOPICAL PRN
OUTPATIENT
Start: 2025-06-26

## 2025-06-26 RX ORDER — GENTAMICIN SULFATE 1 MG/G
OINTMENT TOPICAL PRN
OUTPATIENT
Start: 2025-06-26

## 2025-06-26 RX ORDER — LIDOCAINE HYDROCHLORIDE 40 MG/ML
SOLUTION TOPICAL PRN
Status: DISCONTINUED | OUTPATIENT
Start: 2025-06-26 | End: 2025-06-27 | Stop reason: HOSPADM

## 2025-06-26 RX ORDER — BACITRACIN ZINC AND POLYMYXIN B SULFATE 500; 1000 [USP'U]/G; [USP'U]/G
OINTMENT TOPICAL PRN
OUTPATIENT
Start: 2025-06-26

## 2025-06-26 RX ADMIN — LIDOCAINE HYDROCHLORIDE 2.5 ML: 40 SOLUTION TOPICAL at 08:41

## 2025-06-26 ASSESSMENT — PAIN SCALES - GENERAL: PAINLEVEL_OUTOF10: 0

## 2025-06-26 NOTE — PROGRESS NOTES
[facility]   Progress Note and Procedure Note      Reilly Alexander  MEDICAL RECORD NUMBER:  [id number]  AGE: 25 y.o.   GENDER: male  : 2000  EPISODE DATE:  2025    Subjective:     Chief Complaint   Patient presents with    Wound Check     F/u visit - right foot wounds         HISTORY of PRESENT ILLNESS HPI     Reilly Alexander is a 25 y.o. male who presents today for wound/ulcer evaluation.   History of Wound Context: Diabetic foot ulcer of right lateral foot.  Patient presented to the hospital on 25 with DKA and admitted to ICU.  A1C was 11.6.  Status post I&D on ,  and  by Dr. Bourne; exposed tendon has been surgically debrided.  Patient is still receiving IV Invanz via a right basilic PICC per infusion center/home health.    25:  Patient started metformin today, along with his insulin.  He had not eaten breakfast.  Upon arrival, BP was 103/81 and .  He wasn't feeling well post-debridement.  Was given costa crackers.  Repeat BP 97/63, .  Was given additional costa crackers with peanut butter and cranberry juice.  Repeat FBS was 109, then 5 minutes later, dropped to 100, then 98.  Another 5 minutes, repeat FBS was 102.  After 10 minutes, FBS was 136.  Wound has improved since last week, less tendon exposure.      5/15/25:  Wound continues to improve.      25: Discussed at length the importance of proper footcare and offloading of diabetic foot ulcers.  Patient arrived today with right foot sandal without offloading stating that he was using a cane today instead of his knee scooter to maintain nonweightbearing status to right foot ordered per Dr. Bourne.  Patient encouraged to schedule follow-up appointment with Dr. Bourne as he has not followed up since surgical procedure.  Zinc oxide added to periwound skin due to increased maceration. Surgical shoe provided for offloading.  Patient remains on IV antibiotics via PICC line per ID.

## 2025-06-27 ENCOUNTER — OFFICE VISIT (OUTPATIENT)
Dept: INTERNAL MEDICINE CLINIC | Age: 25
End: 2025-06-27

## 2025-06-27 VITALS
WEIGHT: 212 LBS | OXYGEN SATURATION: 97 % | DIASTOLIC BLOOD PRESSURE: 84 MMHG | HEART RATE: 113 BPM | SYSTOLIC BLOOD PRESSURE: 120 MMHG | BODY MASS INDEX: 27.22 KG/M2

## 2025-06-27 DIAGNOSIS — E11.621 DIABETIC FOOT ULCER WITH OSTEOMYELITIS (HCC): Primary | ICD-10-CM

## 2025-06-27 DIAGNOSIS — Z79.4 TYPE 2 DIABETES MELLITUS WITH FOOT ULCER, WITH LONG-TERM CURRENT USE OF INSULIN (HCC): ICD-10-CM

## 2025-06-27 DIAGNOSIS — M86.9 DIABETIC FOOT ULCER WITH OSTEOMYELITIS (HCC): Primary | ICD-10-CM

## 2025-06-27 DIAGNOSIS — L97.509 DIABETIC FOOT ULCER WITH OSTEOMYELITIS (HCC): Primary | ICD-10-CM

## 2025-06-27 DIAGNOSIS — L97.509 TYPE 2 DIABETES MELLITUS WITH FOOT ULCER, WITH LONG-TERM CURRENT USE OF INSULIN (HCC): ICD-10-CM

## 2025-06-27 DIAGNOSIS — E11.69 DIABETIC FOOT ULCER WITH OSTEOMYELITIS (HCC): Primary | ICD-10-CM

## 2025-06-27 DIAGNOSIS — E11.621 TYPE 2 DIABETES MELLITUS WITH FOOT ULCER, WITH LONG-TERM CURRENT USE OF INSULIN (HCC): ICD-10-CM

## 2025-06-27 PROCEDURE — 99213 OFFICE O/P EST LOW 20 MIN: CPT | Performed by: STUDENT IN AN ORGANIZED HEALTH CARE EDUCATION/TRAINING PROGRAM

## 2025-06-27 PROCEDURE — 3046F HEMOGLOBIN A1C LEVEL >9.0%: CPT | Performed by: STUDENT IN AN ORGANIZED HEALTH CARE EDUCATION/TRAINING PROGRAM

## 2025-06-27 PROCEDURE — 99213 OFFICE O/P EST LOW 20 MIN: CPT

## 2025-06-27 NOTE — PROGRESS NOTES
side effects of prescribed medications.  Barriers to medication compliance addressed.  Discussed all ordered testing and labs. All patient questions answered. Patient agreeable with plan above.     On this date 6/27/2025 I have spent 20 minutes reviewing previous notes, test results and face to face with the patient discussing the diagnosis and importance of compliance with the treatment plan as well as documenting on the day of the visit.      An electronic signature was used to authenticate this note.    --DU Mosqueda - CNP

## 2025-06-30 ASSESSMENT — ENCOUNTER SYMPTOMS
CONSTIPATION: 0
COLOR CHANGE: 0
NAUSEA: 0
COUGH: 0
VOMITING: 0
ABDOMINAL PAIN: 0
VOICE CHANGE: 0
PHOTOPHOBIA: 0
SHORTNESS OF BREATH: 0
DIARRHEA: 0
TROUBLE SWALLOWING: 0

## 2025-06-30 NOTE — ASSESSMENT & PLAN NOTE
Chronic, at goal (stable), continue current treatment plan  - See media for most updated wound picture. Continue wound care visits.

## 2025-07-07 ENCOUNTER — PHARMACY VISIT (OUTPATIENT)
Dept: PHARMACY | Age: 25
End: 2025-07-07

## 2025-07-07 DIAGNOSIS — Z79.4 DIABETES MELLITUS DUE TO UNDERLYING CONDITION WITH HYPERGLYCEMIA, WITH LONG-TERM CURRENT USE OF INSULIN (HCC): ICD-10-CM

## 2025-07-07 DIAGNOSIS — E11.621 TYPE 2 DIABETES MELLITUS WITH FOOT ULCER, WITH LONG-TERM CURRENT USE OF INSULIN (HCC): Primary | ICD-10-CM

## 2025-07-07 DIAGNOSIS — L97.509 TYPE 2 DIABETES MELLITUS WITH FOOT ULCER, WITH LONG-TERM CURRENT USE OF INSULIN (HCC): Primary | ICD-10-CM

## 2025-07-07 DIAGNOSIS — E08.65 DIABETES MELLITUS DUE TO UNDERLYING CONDITION WITH HYPERGLYCEMIA, WITH LONG-TERM CURRENT USE OF INSULIN (HCC): ICD-10-CM

## 2025-07-07 DIAGNOSIS — Z79.4 TYPE 2 DIABETES MELLITUS WITH FOOT ULCER, WITH LONG-TERM CURRENT USE OF INSULIN (HCC): Primary | ICD-10-CM

## 2025-07-07 PROCEDURE — 99213 OFFICE O/P EST LOW 20 MIN: CPT

## 2025-07-07 RX ORDER — ACYCLOVIR 400 MG/1
TABLET ORAL
Qty: 3 EACH | Refills: 2 | Status: SHIPPED | OUTPATIENT
Start: 2025-07-07

## 2025-07-07 RX ORDER — INSULIN GLARGINE 100 [IU]/ML
48 INJECTION, SOLUTION SUBCUTANEOUS NIGHTLY
Qty: 5 ADJUSTABLE DOSE PRE-FILLED PEN SYRINGE | Refills: 2 | Status: SHIPPED | OUTPATIENT
Start: 2025-07-07

## 2025-07-07 RX ORDER — INSULIN LISPRO 100 [IU]/ML
11 INJECTION, SOLUTION INTRAVENOUS; SUBCUTANEOUS
Qty: 5 ADJUSTABLE DOSE PRE-FILLED PEN SYRINGE | Refills: 2 | Status: SHIPPED | OUTPATIENT
Start: 2025-07-07

## 2025-07-07 NOTE — PATIENT INSTRUCTIONS
- Continue Lantus 48 units nightly  - if you continue to have glucose readings in the morning greater than 150, increase Lantus by 2 units every 2-3 days. If you start running < 80 then decrease Lantus by 2 units every 2-3 days.     - Continue Humalog 11 units three times daily before meals.    - if you start having low glucose readings (< 70) after taking your meal time Humalog, then decrease by 1 units every 2 days.   - you can adjust for larger or smaller meals and carbohydrate intake as well.     - if you do not eat, do not take the Humalog 11 units  - if you do not eat, go ahead and take Humalog insulin per sliding scale if glucose is elevated.   - do not take Humalog more than once in 4 hours.   - may need a lower Humalog dose for a late night/early morning meal to prevent hypoglycemia    Sliding scale  Less than 150  No insulin  150-199   1 unit  200-249   2 units  250-299   3 units  300-349   4 units  350-400             5 units  Over 400             6 units

## 2025-07-07 NOTE — PROGRESS NOTES
[facility]  Outpatient Wellness Center  Pharmacy  Diabetes Service    [address], [place] [address]  Phone: [phone]  Fax: [phone]    NAME: Reilly Alexander  MEDICAL RECORD NUMBER:  [id number]  AGE: 25 y.o.   GENDER: male  : 2000  EPISODE DATE:  2025       Reilly Alexander was referred to the Wellness Center by Cynthia Paulino CNP  for Diabetes services.   Special Instructions per the Referral Include: Patient Education and Medication Management      ICD-10-CM    1. Type 2 diabetes mellitus with foot ulcer, with long-term current use of insulin ([facility])  E11.621 Continuous Glucose Sensor (DEXCOM G7 SENSOR) MISC    L97.509     Z79.4       2. Diabetes mellitus due to underlying condition with hyperglycemia, with long-term current use of insulin ([facility])  E08.65 insulin lispro, 1 Unit Dial, (HUMALOG KWIKPEN) 100 UNIT/ML SOPN    Z79.4 insulin glargine (LANTUS SOLOSTAR) 100 UNIT/ML injection pen        Goals: A1C: < 8    Medication adjustments or recommendations will follow ADA guidelines unless otherwise specified on the referral.    Sylvain Grover is a 25 y.o. here for the Diabetes Service for self-management education, medication review including over the counter medications and herbal products, overall wellbeing assessment, transition of care and any needed adjustments with updates and recommendations communicated to the referring physician.  Reilly Alexander appears well and in no acute distress. Comes with no ambulatory device assistance. Is here today alone for diabetes management. Reilly appears ready, willing and able to engage in self-management activities.     Pertinent findings in the office today:   Foot ulcerations, Denies polyuria, Denies polydipsia, Denies polyphagia, Denies vision changes, Denies fatigue, Denies symptoms of neuropathy, Denies nausea, Denies vomiting, and Denies weight loss    Episodes of hypoglycemia with shaking. Hypoglycemia seems to occur

## 2025-07-10 ENCOUNTER — HOSPITAL ENCOUNTER (OUTPATIENT)
Dept: WOUND CARE | Age: 25
Discharge: HOME | End: 2025-07-10
Attending: NURSE PRACTITIONER

## 2025-07-10 VITALS
DIASTOLIC BLOOD PRESSURE: 89 MMHG | SYSTOLIC BLOOD PRESSURE: 143 MMHG | HEART RATE: 111 BPM | TEMPERATURE: 98.1 F | RESPIRATION RATE: 16 BRPM

## 2025-07-10 DIAGNOSIS — E11.621 DIABETIC ULCER OF RIGHT MIDFOOT ASSOCIATED WITH TYPE 2 DIABETES MELLITUS, WITH NECROSIS OF MUSCLE (HCC): Primary | ICD-10-CM

## 2025-07-10 DIAGNOSIS — L97.413 DIABETIC ULCER OF RIGHT MIDFOOT ASSOCIATED WITH TYPE 2 DIABETES MELLITUS, WITH NECROSIS OF MUSCLE (HCC): Primary | ICD-10-CM

## 2025-07-10 DIAGNOSIS — M79.89 LEG SWELLING: ICD-10-CM

## 2025-07-10 PROBLEM — L97.519 DIABETIC ULCER OF RIGHT GREAT TOE (HCC): Status: ACTIVE | Noted: 2025-04-14

## 2025-07-10 PROCEDURE — 11042 DBRDMT SUBQ TIS 1ST 20SQCM/<: CPT

## 2025-07-10 PROCEDURE — 11042 DBRDMT SUBQ TIS 1ST 20SQCM/<: CPT | Performed by: NURSE PRACTITIONER

## 2025-07-10 RX ORDER — SODIUM CHLOR/HYPOCHLOROUS ACID 0.033 %
SOLUTION, IRRIGATION IRRIGATION PRN
OUTPATIENT
Start: 2025-07-10

## 2025-07-10 RX ORDER — TRIAMCINOLONE ACETONIDE 1 MG/G
OINTMENT TOPICAL PRN
OUTPATIENT
Start: 2025-07-10

## 2025-07-10 RX ORDER — LIDOCAINE 40 MG/G
CREAM TOPICAL PRN
OUTPATIENT
Start: 2025-07-10

## 2025-07-10 RX ORDER — BACITRACIN ZINC AND POLYMYXIN B SULFATE 500; 1000 [USP'U]/G; [USP'U]/G
OINTMENT TOPICAL PRN
OUTPATIENT
Start: 2025-07-10

## 2025-07-10 RX ORDER — LIDOCAINE HYDROCHLORIDE 40 MG/ML
SOLUTION TOPICAL PRN
Status: DISCONTINUED | OUTPATIENT
Start: 2025-07-10 | End: 2025-07-11 | Stop reason: HOSPADM

## 2025-07-10 RX ORDER — GENTAMICIN SULFATE 1 MG/G
OINTMENT TOPICAL PRN
OUTPATIENT
Start: 2025-07-10

## 2025-07-10 RX ORDER — LIDOCAINE 50 MG/G
OINTMENT TOPICAL PRN
OUTPATIENT
Start: 2025-07-10

## 2025-07-10 RX ORDER — CLOBETASOL PROPIONATE 0.5 MG/G
OINTMENT TOPICAL PRN
OUTPATIENT
Start: 2025-07-10

## 2025-07-10 RX ORDER — LIDOCAINE HYDROCHLORIDE 40 MG/ML
SOLUTION TOPICAL PRN
OUTPATIENT
Start: 2025-07-10

## 2025-07-10 RX ORDER — LIDOCAINE HYDROCHLORIDE 20 MG/ML
JELLY TOPICAL PRN
OUTPATIENT
Start: 2025-07-10

## 2025-07-10 RX ORDER — MUPIROCIN 2 %
OINTMENT (GRAM) TOPICAL PRN
OUTPATIENT
Start: 2025-07-10

## 2025-07-10 RX ORDER — GINSENG 100 MG
CAPSULE ORAL PRN
OUTPATIENT
Start: 2025-07-10

## 2025-07-10 RX ORDER — BETAMETHASONE DIPROPIONATE 0.5 MG/G
CREAM TOPICAL PRN
OUTPATIENT
Start: 2025-07-10

## 2025-07-10 RX ORDER — NEOMYCIN/BACITRACIN/POLYMYXINB 3.5-400-5K
OINTMENT (GRAM) TOPICAL PRN
OUTPATIENT
Start: 2025-07-10

## 2025-07-10 RX ADMIN — LIDOCAINE HYDROCHLORIDE 2.5 ML: 40 SOLUTION TOPICAL at 09:15

## 2025-07-10 ASSESSMENT — PAIN SCALES - GENERAL: PAINLEVEL_OUTOF10: 0

## 2025-07-10 NOTE — PATIENT INSTRUCTIONS
[facility] [place] Wound Care Physician Orders and Discharge Instructions  [address], [address]        [place], [place] [address]  Telephone: [phone]      FAX [phone]  MONDAY - THURSDAY 8:30 am - 4:30 pm and Friday 8:30 am - 11:30 am      NAME:  Reilly Alexander  YOB: 2000  MEDICAL RECORD NUMBER:  [id number]  DATE:  7/10/2025      Return Appointment:  [x] Return Appointment: With Ryan Brown CNP  in  1 Week(s)             [] Nurse Visit for Wound Assessment:  [] DME/Wound Dressing Supplies Provided by: Other n/a WILL HAVE TO PURCHASE SUPPLIES THROUGH [employer], OR AT YOUR LOCAL STORE LIKE [employer]   Please call them directly to reorder supplies when you run out)  [] ECF or Home Healthcare:  Other: n/a  Your Home Care Agency is responsible to order your supplies.   [] Orders placed during your visit:      INSTRUCTED WITH STRONG RECOMMENDATION TO BUY A NEW GLUCOMETER TODAY!         Please call to schedule an appointment:   Dr Bourne   [address], [address], [place] [address]   [phone]     **ANTIBIOTICS PER DR CASTANEDA**     Important Reminders:   Please wash hands with soap and water before and after every dressing change.  If you smoke we ask that you refrain from smoking. Smoking inhibits wounds from healing.  When taking antibiotics take the entire prescription as ordered. Do not stop taking until medication is all gone unless otherwise instructed.   Do not get wounds wet in the bath or shower unless otherwise instructed by your physician. If your wound is on your foot or leg, you may purchase a cast bag. Please ask at your local pharmacy.   IF YOU ARE UNABLE TO OBTAIN WOUND SUPPLIES, CONTINUE TO USE THE SUPPLIES YOU HAVE AVAILABLE UNTIL YOU ARE ABLE TO REACH US. IT IS MOST IMPORTANT TO KEEP THE WOUND COVERED AT ALL TIMES.  ___________________________________________________________________    Wound Location: Right  Foot    WOUND

## 2025-07-10 NOTE — PROGRESS NOTES
[facility]  ____________________________________________________________________________________________  Pain:   Please Note : some pain, drainage and/or bleeding might be expected after seeing the provider.     If you are still having pain after you go home:  For wounds on lower legs or arms, elevate the affected limb.  Use over-the-counter medications you would normally use for pain as permitted by your primary care doctor.    For Persistent Pain not relieved by the above interventions, please notify your family doctor.     Seek immediate medical care if:    You have symptoms of infection, such as:  Increased pain, swelling, warmth, or redness.  Red streaks leading from the area.  Pus draining from the area.  A fever.     _______________________________________________________________________________________________    : CITLALLI      Electronically signed by : Citlalli BELTRE on 7/10/2025 at 9:22 AM     Wound Care Center Information: Should you experience any significant changes in your wound(s) or have questions about your wound care, please contact the [facility] at [phone].   MONDAY through THURSDAY 8:00 am - 4:00 pm.  Friday 8:00 am - 11:30 am.   The office is closed on all major holidays.   (Hours of operation are subject to change).     Please give us 24-48 business hours to return your call.  If you need help with your wounds and cannot wait until we are available, contact your Primary Care Physician or go to your preferred emergency room.      Physician Signature:_______________________    Date: ___________ Time:  ____________    Ryan Brown CNP        Electronically signed by DU Calle CNP on 7/10/2025 at 11:50 AM

## 2025-07-17 ENCOUNTER — HOSPITAL ENCOUNTER (OUTPATIENT)
Dept: WOUND CARE | Age: 25
Discharge: HOME | End: 2025-07-17
Attending: NURSE PRACTITIONER
Payer: COMMERCIAL

## 2025-07-17 VITALS
TEMPERATURE: 98.6 F | DIASTOLIC BLOOD PRESSURE: 86 MMHG | HEART RATE: 104 BPM | SYSTOLIC BLOOD PRESSURE: 120 MMHG | RESPIRATION RATE: 16 BRPM

## 2025-07-17 DIAGNOSIS — E11.621 DIABETIC ULCER OF RIGHT MIDFOOT ASSOCIATED WITH TYPE 2 DIABETES MELLITUS, WITH NECROSIS OF MUSCLE (HCC): Primary | ICD-10-CM

## 2025-07-17 DIAGNOSIS — M79.89 LEG SWELLING: ICD-10-CM

## 2025-07-17 DIAGNOSIS — E11.621 DIABETIC ULCER OF RIGHT GREAT TOE (HCC): ICD-10-CM

## 2025-07-17 DIAGNOSIS — L97.413 DIABETIC ULCER OF RIGHT MIDFOOT ASSOCIATED WITH TYPE 2 DIABETES MELLITUS, WITH NECROSIS OF MUSCLE (HCC): Primary | ICD-10-CM

## 2025-07-17 DIAGNOSIS — L97.519 DIABETIC ULCER OF RIGHT GREAT TOE (HCC): ICD-10-CM

## 2025-07-17 PROCEDURE — 11042 DBRDMT SUBQ TIS 1ST 20SQCM/<: CPT

## 2025-07-17 RX ORDER — LIDOCAINE HYDROCHLORIDE 40 MG/ML
SOLUTION TOPICAL PRN
Status: DISCONTINUED | OUTPATIENT
Start: 2025-07-17 | End: 2025-07-18 | Stop reason: HOSPADM

## 2025-07-17 RX ORDER — BETAMETHASONE DIPROPIONATE 0.5 MG/G
CREAM TOPICAL PRN
OUTPATIENT
Start: 2025-07-17

## 2025-07-17 RX ORDER — NEOMYCIN/BACITRACIN/POLYMYXINB 3.5-400-5K
OINTMENT (GRAM) TOPICAL PRN
OUTPATIENT
Start: 2025-07-17

## 2025-07-17 RX ORDER — BACITRACIN ZINC AND POLYMYXIN B SULFATE 500; 1000 [USP'U]/G; [USP'U]/G
OINTMENT TOPICAL PRN
OUTPATIENT
Start: 2025-07-17

## 2025-07-17 RX ORDER — SODIUM CHLOR/HYPOCHLOROUS ACID 0.033 %
SOLUTION, IRRIGATION IRRIGATION PRN
OUTPATIENT
Start: 2025-07-17

## 2025-07-17 RX ORDER — LIDOCAINE 40 MG/G
CREAM TOPICAL PRN
OUTPATIENT
Start: 2025-07-17

## 2025-07-17 RX ORDER — CLOBETASOL PROPIONATE 0.5 MG/G
OINTMENT TOPICAL PRN
OUTPATIENT
Start: 2025-07-17

## 2025-07-17 RX ORDER — GINSENG 100 MG
CAPSULE ORAL PRN
OUTPATIENT
Start: 2025-07-17

## 2025-07-17 RX ORDER — GENTAMICIN SULFATE 1 MG/G
OINTMENT TOPICAL PRN
OUTPATIENT
Start: 2025-07-17

## 2025-07-17 RX ORDER — LIDOCAINE HYDROCHLORIDE 40 MG/ML
SOLUTION TOPICAL PRN
OUTPATIENT
Start: 2025-07-17

## 2025-07-17 RX ORDER — LIDOCAINE 50 MG/G
OINTMENT TOPICAL PRN
OUTPATIENT
Start: 2025-07-17

## 2025-07-17 RX ORDER — LIDOCAINE HYDROCHLORIDE 20 MG/ML
JELLY TOPICAL PRN
OUTPATIENT
Start: 2025-07-17

## 2025-07-17 RX ORDER — TRIAMCINOLONE ACETONIDE 1 MG/G
OINTMENT TOPICAL PRN
OUTPATIENT
Start: 2025-07-17

## 2025-07-17 RX ORDER — MUPIROCIN 2 %
OINTMENT (GRAM) TOPICAL PRN
OUTPATIENT
Start: 2025-07-17

## 2025-07-17 RX ADMIN — LIDOCAINE HYDROCHLORIDE: 40 SOLUTION TOPICAL at 08:46

## 2025-07-17 ASSESSMENT — PAIN SCALES - GENERAL
PAINLEVEL_OUTOF10: 0
PAINLEVEL_OUTOF10: 0

## 2025-07-17 NOTE — PROGRESS NOTES
[facility]   Progress Note and Procedure Note      Reilly Alexander  MEDICAL RECORD NUMBER:  [id number]  AGE: 25 y.o.   GENDER: male  : 2000  EPISODE DATE:  2025    Subjective:     Chief Complaint   Patient presents with    Wound Check     Follow Up on Right Foot         HISTORY of PRESENT ILLNESS HPI     Reilly Alexander is a 25 y.o. male who presents today for wound/ulcer evaluation.   History of Wound Context: Diabetic foot ulcer of right lateral foot.  Patient presented to the hospital on 25 with DKA and admitted to ICU.  A1C was 11.6.  Status post I&D on ,  and  by Dr. Bourne; exposed tendon has been surgically debrided.  Patient is still receiving IV Invanz via a right basilic PICC per infusion center/home health.    25:  Patient started metformin today, along with his insulin.  He had not eaten breakfast.  Upon arrival, BP was 103/81 and .  He wasn't feeling well post-debridement.  Was given costa crackers.  Repeat BP 97/63, .  Was given additional costa crackers with peanut butter and cranberry juice.  Repeat FBS was 109, then 5 minutes later, dropped to 100, then 98.  Another 5 minutes, repeat FBS was 102.  After 10 minutes, FBS was 136.  Wound has improved since last week, less tendon exposure.      5/15/25:  Wound continues to improve.      25: Discussed at length the importance of proper footcare and offloading of diabetic foot ulcers.  Patient arrived today with right foot sandal without offloading stating that he was using a cane today instead of his knee scooter to maintain nonweightbearing status to right foot ordered per Dr. Bourne.  Patient encouraged to schedule follow-up appointment with Dr. Bourne as he has not followed up since surgical procedure.  Zinc oxide added to periwound skin due to increased maceration. Surgical shoe provided for offloading.  Patient remains on IV antibiotics via PICC line per ID.  Continue

## 2025-07-17 NOTE — PATIENT INSTRUCTIONS
[facility] Wound Care Physician Orders and Discharge Instructions  [address], [address]        [place], [place] [address]  Telephone: [phone]      FAX [phone]  MONDAY - THURSDAY 8:30 am - 4:30 pm and Friday 8:30 am - 11:30 am      NAME:  Reilly Alexander  YOB: 2000  MEDICAL RECORD NUMBER:  [id number]  DATE:  7/17/2025      Return Appointment:  [x] Return Appointment: With Ryan Brown CNP  in  1 Week(s)             [] Nurse Visit for Wound Assessment:  [] DME/Wound Dressing Supplies Provided by: Other n/a WILL HAVE TO PURCHASE SUPPLIES THROUGH [employer], OR AT YOUR LOCAL STORE LIKE [employer]   Please call them directly to reorder supplies when you run out)  [] ECF or Home Healthcare:  Other: n/a  Your Home Care Agency is responsible to order your supplies.   [] Orders placed during your visit:      **USE INSULIN DAILY AS ORDERED**        Please call to schedule an appointment:   Dr Bourne   [address], [address], [place] [address]   [phone]     **ANTIBIOTICS PER DR CASTANEDA**     Important Reminders:   Please wash hands with soap and water before and after every dressing change.  If you smoke we ask that you refrain from smoking. Smoking inhibits wounds from healing.  When taking antibiotics take the entire prescription as ordered. Do not stop taking until medication is all gone unless otherwise instructed.   Do not get wounds wet in the bath or shower unless otherwise instructed by your physician. If your wound is on your foot or leg, you may purchase a cast bag. Please ask at your local pharmacy.   IF YOU ARE UNABLE TO OBTAIN WOUND SUPPLIES, CONTINUE TO USE THE SUPPLIES YOU HAVE AVAILABLE UNTIL YOU ARE ABLE TO REACH US. IT IS MOST IMPORTANT TO KEEP THE WOUND COVERED AT ALL TIMES.  ___________________________________________________________________    Wound Location: Right  Foot    WOUND CLEANSING:Do NOT Scrub Wound   Normal Saline

## 2025-07-24 ENCOUNTER — HOSPITAL ENCOUNTER (OUTPATIENT)
Dept: WOUND CARE | Age: 25
Discharge: HOME | End: 2025-07-24
Attending: NURSE PRACTITIONER
Payer: COMMERCIAL

## 2025-07-24 VITALS
RESPIRATION RATE: 16 BRPM | TEMPERATURE: 98.2 F | DIASTOLIC BLOOD PRESSURE: 86 MMHG | SYSTOLIC BLOOD PRESSURE: 112 MMHG | HEART RATE: 108 BPM

## 2025-07-24 DIAGNOSIS — E11.621 DIABETIC ULCER OF RIGHT MIDFOOT ASSOCIATED WITH TYPE 2 DIABETES MELLITUS, WITH NECROSIS OF MUSCLE (HCC): Primary | ICD-10-CM

## 2025-07-24 DIAGNOSIS — M79.89 LEG SWELLING: ICD-10-CM

## 2025-07-24 DIAGNOSIS — L97.519 DIABETIC ULCER OF RIGHT GREAT TOE (HCC): ICD-10-CM

## 2025-07-24 DIAGNOSIS — E11.621 DIABETIC ULCER OF RIGHT GREAT TOE (HCC): ICD-10-CM

## 2025-07-24 DIAGNOSIS — L97.413 DIABETIC ULCER OF RIGHT MIDFOOT ASSOCIATED WITH TYPE 2 DIABETES MELLITUS, WITH NECROSIS OF MUSCLE (HCC): Primary | ICD-10-CM

## 2025-07-24 PROCEDURE — 11042 DBRDMT SUBQ TIS 1ST 20SQCM/<: CPT | Performed by: NURSE PRACTITIONER

## 2025-07-24 PROCEDURE — 11042 DBRDMT SUBQ TIS 1ST 20SQCM/<: CPT

## 2025-07-24 RX ORDER — LIDOCAINE HYDROCHLORIDE 40 MG/ML
SOLUTION TOPICAL PRN
OUTPATIENT
Start: 2025-07-24

## 2025-07-24 RX ORDER — LIDOCAINE HYDROCHLORIDE 20 MG/ML
JELLY TOPICAL PRN
OUTPATIENT
Start: 2025-07-24

## 2025-07-24 RX ORDER — NEOMYCIN/BACITRACIN/POLYMYXINB 3.5-400-5K
OINTMENT (GRAM) TOPICAL PRN
OUTPATIENT
Start: 2025-07-24

## 2025-07-24 RX ORDER — CLOBETASOL PROPIONATE 0.5 MG/G
OINTMENT TOPICAL PRN
OUTPATIENT
Start: 2025-07-24

## 2025-07-24 RX ORDER — TRIAMCINOLONE ACETONIDE 1 MG/G
OINTMENT TOPICAL PRN
OUTPATIENT
Start: 2025-07-24

## 2025-07-24 RX ORDER — LIDOCAINE 50 MG/G
OINTMENT TOPICAL PRN
OUTPATIENT
Start: 2025-07-24

## 2025-07-24 RX ORDER — GINSENG 100 MG
CAPSULE ORAL PRN
OUTPATIENT
Start: 2025-07-24

## 2025-07-24 RX ORDER — LIDOCAINE 40 MG/G
CREAM TOPICAL PRN
OUTPATIENT
Start: 2025-07-24

## 2025-07-24 RX ORDER — GENTAMICIN SULFATE 1 MG/G
OINTMENT TOPICAL PRN
OUTPATIENT
Start: 2025-07-24

## 2025-07-24 RX ORDER — LIDOCAINE HYDROCHLORIDE 40 MG/ML
SOLUTION TOPICAL PRN
Status: DISCONTINUED | OUTPATIENT
Start: 2025-07-24 | End: 2025-07-25 | Stop reason: HOSPADM

## 2025-07-24 RX ORDER — BACITRACIN ZINC AND POLYMYXIN B SULFATE 500; 1000 [USP'U]/G; [USP'U]/G
OINTMENT TOPICAL PRN
OUTPATIENT
Start: 2025-07-24

## 2025-07-24 RX ORDER — MUPIROCIN 2 %
OINTMENT (GRAM) TOPICAL PRN
OUTPATIENT
Start: 2025-07-24

## 2025-07-24 RX ORDER — BETAMETHASONE DIPROPIONATE 0.5 MG/G
CREAM TOPICAL PRN
OUTPATIENT
Start: 2025-07-24

## 2025-07-24 RX ORDER — SODIUM CHLOR/HYPOCHLOROUS ACID 0.033 %
SOLUTION, IRRIGATION IRRIGATION PRN
OUTPATIENT
Start: 2025-07-24

## 2025-07-24 RX ADMIN — LIDOCAINE HYDROCHLORIDE: 40 SOLUTION TOPICAL at 08:43

## 2025-07-24 NOTE — PATIENT INSTRUCTIONS
[facility] Wound Care Physician Orders and Discharge Instructions  [address], [address]        [place], [place] [address]  Telephone: [phone]      FAX [phone]  MONDAY - THURSDAY 8:30 am - 4:30 pm and Friday 8:30 am - 11:30 am      NAME:  Reilly Alexander  YOB: 2000  MEDICAL RECORD NUMBER:  [id number]  DATE:  7/24/2025      Return Appointment:  [x] Return Appointment: With Ryan Brown CNP  in  1 Week(s)             [] Nurse Visit for Wound Assessment:  [] DME/Wound Dressing Supplies Provided by: Other n/a WILL HAVE TO PURCHASE SUPPLIES THROUGH [employer], OR AT YOUR LOCAL STORE LIKE [employer]   Please call them directly to reorder supplies when you run out)  [] ECF or Home Healthcare:  Other: n/a  Your Home Care Agency is responsible to order your supplies.   [] Orders placed during your visit:      **USE INSULIN DAILY AS ORDERED**        Please call to schedule an appointment:   Dr Bourne   [address], [address], [place] [address]   [phone]     **ANTIBIOTICS PER DR CASTANEDA**     Important Reminders:   Please wash hands with soap and water before and after every dressing change.  If you smoke we ask that you refrain from smoking. Smoking inhibits wounds from healing.  When taking antibiotics take the entire prescription as ordered. Do not stop taking until medication is all gone unless otherwise instructed.   Do not get wounds wet in the bath or shower unless otherwise instructed by your physician. If your wound is on your foot or leg, you may purchase a cast bag. Please ask at your local pharmacy.   IF YOU ARE UNABLE TO OBTAIN WOUND SUPPLIES, CONTINUE TO USE THE SUPPLIES YOU HAVE AVAILABLE UNTIL YOU ARE ABLE TO REACH US. IT IS MOST IMPORTANT TO KEEP THE WOUND COVERED AT ALL TIMES.  ___________________________________________________________________    Wound Location: Right  Foot    WOUND CLEANSING:Do NOT Scrub Wound   Normal Saline

## 2025-07-25 ENCOUNTER — APPOINTMENT (OUTPATIENT)
Dept: PHARMACY | Age: 25
End: 2025-07-25
Payer: COMMERCIAL

## 2025-07-25 NOTE — PROGRESS NOTES
DRESSING: Collagen AG POWDER, mixed with Mupirocin, to top of foot wound    SECONDARY DRESSING:  2X2  non woven gauze pad and Manju (Conforming roll)                          Surgical shoe   Ace wrap    Dressing Frequency:TUESDAY, THURSDAY, AND SATURDAY   ___________________________________________________________________________________________  Wound Location: Right  Toe(s)     WOUND CLEANSING:Do NOT Scrub Wound   Normal Saline  Keep DRY in the Shower                                                                             Do not get dressing or wound wet in shower    PRIMARY DRESSING: Collagen AG POWDER, mixed with MUPIROCIN     SECONDARY DRESSING: 2X2  non woven gauze pad and Manju (Conforming roll)                                                                        OFF LOAD WITH FELT PAD/ PODIATRY PAD                                                                - ACE WRAP     Dressing Frequency:TUESDAY, THURSDAY, AND SATURDAY   ____________________________________________________________________________________________  Negative Pressure:   N/A  _____________________________________________________________________________________________   Pressure Relief and Off Loading:  N/A  Specialty equipment ordered:         []Wheelchair cushion            [] Specialty Bed/Mattress  ______________________________________________________________________________________________                       [x]Activity: Avoid weight bearing to right lower leg                    [] Assistive Devices   Knee scooter    Use as instructed by the provider     ____________________________________________________________________________________________     Dietary: Continue your diet as tolerated.  Important dietary reminders:  1. Increase Protein intake (i.e. Lean meats, fish, eggs, legumes, and yogurt).   2. Limit Sodium, Alcohol and Sugar.   3. If diabetic, follow a diabetic diet and check glucose prior to meals or as instructed

## 2025-07-31 ENCOUNTER — HOSPITAL ENCOUNTER (OUTPATIENT)
Dept: WOUND CARE | Age: 25
Discharge: HOME | End: 2025-07-31
Attending: NURSE PRACTITIONER
Payer: COMMERCIAL

## 2025-07-31 VITALS
DIASTOLIC BLOOD PRESSURE: 77 MMHG | HEART RATE: 105 BPM | SYSTOLIC BLOOD PRESSURE: 125 MMHG | TEMPERATURE: 98.1 F | RESPIRATION RATE: 16 BRPM

## 2025-07-31 DIAGNOSIS — E11.621 DIABETIC ULCER OF RIGHT MIDFOOT ASSOCIATED WITH TYPE 2 DIABETES MELLITUS, WITH NECROSIS OF MUSCLE (HCC): Primary | ICD-10-CM

## 2025-07-31 DIAGNOSIS — E11.621 DIABETIC ULCER OF RIGHT GREAT TOE (HCC): ICD-10-CM

## 2025-07-31 DIAGNOSIS — L97.519 DIABETIC ULCER OF RIGHT GREAT TOE (HCC): ICD-10-CM

## 2025-07-31 DIAGNOSIS — L97.413 DIABETIC ULCER OF RIGHT MIDFOOT ASSOCIATED WITH TYPE 2 DIABETES MELLITUS, WITH NECROSIS OF MUSCLE (HCC): Primary | ICD-10-CM

## 2025-07-31 DIAGNOSIS — M79.89 LEG SWELLING: ICD-10-CM

## 2025-07-31 PROCEDURE — 11042 DBRDMT SUBQ TIS 1ST 20SQCM/<: CPT

## 2025-07-31 PROCEDURE — 11042 DBRDMT SUBQ TIS 1ST 20SQCM/<: CPT | Performed by: NURSE PRACTITIONER

## 2025-07-31 RX ORDER — TRIAMCINOLONE ACETONIDE 1 MG/G
OINTMENT TOPICAL PRN
OUTPATIENT
Start: 2025-07-31

## 2025-07-31 RX ORDER — GENTAMICIN SULFATE 1 MG/G
OINTMENT TOPICAL PRN
OUTPATIENT
Start: 2025-07-31

## 2025-07-31 RX ORDER — MUPIROCIN 2 %
OINTMENT (GRAM) TOPICAL PRN
OUTPATIENT
Start: 2025-07-31

## 2025-07-31 RX ORDER — LIDOCAINE HYDROCHLORIDE 20 MG/ML
JELLY TOPICAL PRN
OUTPATIENT
Start: 2025-07-31

## 2025-07-31 RX ORDER — LIDOCAINE HYDROCHLORIDE 40 MG/ML
SOLUTION TOPICAL PRN
Status: DISCONTINUED | OUTPATIENT
Start: 2025-07-31 | End: 2025-08-01 | Stop reason: HOSPADM

## 2025-07-31 RX ORDER — LIDOCAINE HYDROCHLORIDE 40 MG/ML
SOLUTION TOPICAL PRN
OUTPATIENT
Start: 2025-07-31

## 2025-07-31 RX ORDER — BETAMETHASONE DIPROPIONATE 0.5 MG/G
CREAM TOPICAL PRN
OUTPATIENT
Start: 2025-07-31

## 2025-07-31 RX ORDER — GINSENG 100 MG
CAPSULE ORAL PRN
OUTPATIENT
Start: 2025-07-31

## 2025-07-31 RX ORDER — NEOMYCIN/BACITRACIN/POLYMYXINB 3.5-400-5K
OINTMENT (GRAM) TOPICAL PRN
OUTPATIENT
Start: 2025-07-31

## 2025-07-31 RX ORDER — CLOBETASOL PROPIONATE 0.5 MG/G
OINTMENT TOPICAL PRN
OUTPATIENT
Start: 2025-07-31

## 2025-07-31 RX ORDER — SODIUM CHLOR/HYPOCHLOROUS ACID 0.033 %
SOLUTION, IRRIGATION IRRIGATION PRN
OUTPATIENT
Start: 2025-07-31

## 2025-07-31 RX ORDER — LIDOCAINE 50 MG/G
OINTMENT TOPICAL PRN
OUTPATIENT
Start: 2025-07-31

## 2025-07-31 RX ORDER — BACITRACIN ZINC AND POLYMYXIN B SULFATE 500; 1000 [USP'U]/G; [USP'U]/G
OINTMENT TOPICAL PRN
OUTPATIENT
Start: 2025-07-31

## 2025-07-31 RX ORDER — LIDOCAINE 40 MG/G
CREAM TOPICAL PRN
OUTPATIENT
Start: 2025-07-31

## 2025-07-31 RX ADMIN — LIDOCAINE HYDROCHLORIDE: 40 SOLUTION TOPICAL at 08:38

## 2025-07-31 NOTE — PATIENT INSTRUCTIONS
[facility] Wound Care Physician Orders and Discharge Instructions  [address], [address]        [place], [place] [address]  Telephone: [phone]      FAX [phone]  MONDAY - THURSDAY 8:30 am - 4:30 pm and Friday 8:30 am - 11:30 am      NAME:  Reilly Alexander  YOB: 2000  MEDICAL RECORD NUMBER:  [id number]  DATE:  7/31/2025      Return Appointment:  [x] Return Appointment: With Ryan Brown CNP  in  2 Week(s)             [] Nurse Visit for Wound Assessment:  [] DME/Wound Dressing Supplies Provided by: Other n/a WILL HAVE TO PURCHASE SUPPLIES THROUGH [employer], OR AT YOUR LOCAL STORE LIKE [employer]   Please call them directly to reorder supplies when you run out)  [] ECF or Home Healthcare:  Other: n/a  Your Home Care Agency is responsible to order your supplies.   [] Orders placed during your visit:      **USE INSULIN DAILY AS ORDERED**        Please call to schedule an appointment:   Dr Bourne   [address], [address], [place] [address]   [phone]     **ANTIBIOTICS PER DR CASTANEDA**     Important Reminders:   Please wash hands with soap and water before and after every dressing change.  If you smoke we ask that you refrain from smoking. Smoking inhibits wounds from healing.  When taking antibiotics take the entire prescription as ordered. Do not stop taking until medication is all gone unless otherwise instructed.   Do not get wounds wet in the bath or shower unless otherwise instructed by your physician. If your wound is on your foot or leg, you may purchase a cast bag. Please ask at your local pharmacy.   IF YOU ARE UNABLE TO OBTAIN WOUND SUPPLIES, CONTINUE TO USE THE SUPPLIES YOU HAVE AVAILABLE UNTIL YOU ARE ABLE TO REACH US. IT IS MOST IMPORTANT TO KEEP THE WOUND COVERED AT ALL TIMES.  ___________________________________________________________________    Wound Location: Right  Foot- HEALED.   DO NOT SCRUB AREA

## 2025-07-31 NOTE — PROGRESS NOTES
[place] [address]  Telephone: [phone]      FAX [phone]  MONDAY - THURSDAY 8:30 am - 4:30 pm and Friday 8:30 am - 11:30 am      NAME:  Reilly Alexander  YOB: 2000  MEDICAL RECORD NUMBER:  [id number]  DATE:  7/31/2025      Return Appointment:  [x] Return Appointment: With Ryan Brown CNP  in  2 Week(s)             [] Nurse Visit for Wound Assessment:  [] DME/Wound Dressing Supplies Provided by: Other n/a WILL HAVE TO PURCHASE SUPPLIES THROUGH [employer], OR AT YOUR LOCAL STORE LIKE [employer]   Please call them directly to reorder supplies when you run out)  [] ECF or Home Healthcare:  Other: n/a  Your Home Care Agency is responsible to order your supplies.   [] Orders placed during your visit:      **USE INSULIN DAILY AS ORDERED**        Please call to schedule an appointment:   Dr Bourne   [address], [place] [address]   [phone]     **ANTIBIOTICS PER DR CASTANEDA**     Important Reminders:   Please wash hands with soap and water before and after every dressing change.  If you smoke we ask that you refrain from smoking. Smoking inhibits wounds from healing.  When taking antibiotics take the entire prescription as ordered. Do not stop taking until medication is all gone unless otherwise instructed.   Do not get wounds wet in the bath or shower unless otherwise instructed by your physician. If your wound is on your foot or leg, you may purchase a cast bag. Please ask at your local pharmacy.   IF YOU ARE UNABLE TO OBTAIN WOUND SUPPLIES, CONTINUE TO USE THE SUPPLIES YOU HAVE AVAILABLE UNTIL YOU ARE ABLE TO REACH US. IT IS MOST IMPORTANT TO KEEP THE WOUND COVERED AT ALL TIMES.  ___________________________________________________________________    Wound Location: Right  Foot- HEALED.   DO NOT SCRUB AREA     ___________________________________________________________________________________________  Wound Location: Right  Toe(s)     WOUND

## 2025-08-14 ENCOUNTER — PHARMACY VISIT (OUTPATIENT)
Dept: PHARMACY | Age: 25
End: 2025-08-14

## 2025-08-14 ENCOUNTER — HOSPITAL ENCOUNTER (OUTPATIENT)
Dept: WOUND CARE | Age: 25
Discharge: HOME | End: 2025-08-14
Attending: NURSE PRACTITIONER
Payer: COMMERCIAL

## 2025-08-14 VITALS
TEMPERATURE: 98 F | SYSTOLIC BLOOD PRESSURE: 145 MMHG | RESPIRATION RATE: 16 BRPM | DIASTOLIC BLOOD PRESSURE: 88 MMHG | HEART RATE: 103 BPM

## 2025-08-14 DIAGNOSIS — E10.10 DKA, TYPE 1, NOT AT GOAL (HCC): ICD-10-CM

## 2025-08-14 DIAGNOSIS — L97.519 DIABETIC ULCER OF RIGHT GREAT TOE (HCC): ICD-10-CM

## 2025-08-14 DIAGNOSIS — E10.10 DKA, TYPE 1, NOT AT GOAL (HCC): Primary | ICD-10-CM

## 2025-08-14 DIAGNOSIS — L97.413 DIABETIC ULCER OF RIGHT MIDFOOT ASSOCIATED WITH TYPE 2 DIABETES MELLITUS, WITH NECROSIS OF MUSCLE (HCC): Primary | ICD-10-CM

## 2025-08-14 DIAGNOSIS — E11.621 DIABETIC ULCER OF RIGHT GREAT TOE (HCC): ICD-10-CM

## 2025-08-14 DIAGNOSIS — M79.89 LEG SWELLING: ICD-10-CM

## 2025-08-14 DIAGNOSIS — E11.621 DIABETIC ULCER OF RIGHT MIDFOOT ASSOCIATED WITH TYPE 2 DIABETES MELLITUS, WITH NECROSIS OF MUSCLE (HCC): Primary | ICD-10-CM

## 2025-08-14 PROCEDURE — 11042 DBRDMT SUBQ TIS 1ST 20SQCM/<: CPT

## 2025-08-14 PROCEDURE — 11042 DBRDMT SUBQ TIS 1ST 20SQCM/<: CPT | Performed by: NURSE PRACTITIONER

## 2025-08-14 PROCEDURE — 99212 OFFICE O/P EST SF 10 MIN: CPT

## 2025-08-14 RX ORDER — GENTAMICIN SULFATE 1 MG/G
OINTMENT TOPICAL PRN
OUTPATIENT
Start: 2025-08-14

## 2025-08-14 RX ORDER — BACITRACIN ZINC AND POLYMYXIN B SULFATE 500; 1000 [USP'U]/G; [USP'U]/G
OINTMENT TOPICAL PRN
OUTPATIENT
Start: 2025-08-14

## 2025-08-14 RX ORDER — SODIUM CHLOR/HYPOCHLOROUS ACID 0.033 %
SOLUTION, IRRIGATION IRRIGATION PRN
OUTPATIENT
Start: 2025-08-14

## 2025-08-14 RX ORDER — GINSENG 100 MG
CAPSULE ORAL PRN
OUTPATIENT
Start: 2025-08-14

## 2025-08-14 RX ORDER — LIDOCAINE 50 MG/G
OINTMENT TOPICAL PRN
OUTPATIENT
Start: 2025-08-14

## 2025-08-14 RX ORDER — LIDOCAINE HYDROCHLORIDE 20 MG/ML
JELLY TOPICAL PRN
OUTPATIENT
Start: 2025-08-14

## 2025-08-14 RX ORDER — BETAMETHASONE DIPROPIONATE 0.5 MG/G
CREAM TOPICAL PRN
OUTPATIENT
Start: 2025-08-14

## 2025-08-14 RX ORDER — LIDOCAINE 40 MG/G
CREAM TOPICAL PRN
OUTPATIENT
Start: 2025-08-14

## 2025-08-14 RX ORDER — NEOMYCIN/BACITRACIN/POLYMYXINB 3.5-400-5K
OINTMENT (GRAM) TOPICAL PRN
OUTPATIENT
Start: 2025-08-14

## 2025-08-14 RX ORDER — CLOBETASOL PROPIONATE 0.5 MG/G
OINTMENT TOPICAL PRN
OUTPATIENT
Start: 2025-08-14

## 2025-08-14 RX ORDER — TRIAMCINOLONE ACETONIDE 1 MG/G
OINTMENT TOPICAL PRN
OUTPATIENT
Start: 2025-08-14

## 2025-08-14 RX ORDER — LIDOCAINE HYDROCHLORIDE 40 MG/ML
SOLUTION TOPICAL PRN
OUTPATIENT
Start: 2025-08-14

## 2025-08-14 RX ORDER — MUPIROCIN 2 %
OINTMENT (GRAM) TOPICAL PRN
OUTPATIENT
Start: 2025-08-14

## 2025-08-14 ASSESSMENT — PAIN SCALES - GENERAL: PAINLEVEL_OUTOF10: 0

## 2025-08-15 LAB
ANION GAP SERPL CALCULATED.3IONS-SCNC: 11 MMOL/L (ref 3–16)
BUN SERPL-MCNC: 16 MG/DL (ref 7–20)
C PEPTIDE SERPL-MCNC: 0.8 NG/ML (ref 1.1–4.4)
CALCIUM SERPL-MCNC: 9.3 MG/DL (ref 8.3–10.6)
CHLORIDE SERPL-SCNC: 102 MMOL/L (ref 99–110)
CO2 SERPL-SCNC: 28 MMOL/L (ref 21–32)
CREAT SERPL-MCNC: 0.5 MG/DL (ref 0.9–1.3)
EST. AVERAGE GLUCOSE BLD GHB EST-MCNC: 145.6 MG/DL
GFR SERPLBLD CREATININE-BSD FMLA CKD-EPI: >90 ML/MIN/{1.73_M2}
GLUCOSE SERPL-MCNC: 128 MG/DL (ref 70–99)
HBA1C MFR BLD: 6.7 %
POTASSIUM SERPL-SCNC: 4.2 MMOL/L (ref 3.5–5.1)
SODIUM SERPL-SCNC: 141 MMOL/L (ref 136–145)

## 2025-08-18 LAB — INSULIN HUMAN AB SER-ACNC: >50 U/ML (ref 0–0.4)

## 2025-08-19 LAB — GAD65 AB SER IA-ACNC: <5 IU/ML (ref 0–5)

## 2025-08-22 LAB — ZNT8 AB SERPL IA-ACNC: <10 U/ML (ref 0–15)

## (undated) DEVICE — 3M™ IOBAN™ 2 ANTIMICROBIAL INCISE DRAPE 6640EZ: Brand: IOBAN™ 2

## (undated) DEVICE — SOLUTION SCRB 4OZ 7.5% POVIDONE IOD ANTIMIC BTL

## (undated) DEVICE — PADDING,UNDERCAST,COTTON, 4"X4YD STERILE: Brand: MEDLINE

## (undated) DEVICE — SYRINGE IRRIG 60ML SFT PLIABLE BLB EZ TO GRP 1 HND USE W/

## (undated) DEVICE — GOWN SIRUS NONREIN XL W/TWL: Brand: MEDLINE INDUSTRIES, INC.

## (undated) DEVICE — SOLUTION PREP PAINT POV IOD FOR SKIN MUCOUS MEM

## (undated) DEVICE — BANDAGE COMPR W4INXL15FT BGE E SGL LAYERED CLP CLSR

## (undated) DEVICE — PODIATRY: Brand: MEDLINE INDUSTRIES, INC.

## (undated) DEVICE — GLOVE SURG SZ 65 THK91MIL LTX FREE SYN POLYISOPRENE

## (undated) DEVICE — SPONGE LAP W18XL18IN WHT COT 4 PLY FLD STRUNG RADPQ DISP ST 2 PER PACK

## (undated) DEVICE — TRANSFER SET 3": Brand: MEDLINE INDUSTRIES, INC.

## (undated) DEVICE — PREMIUM DRY TRAY LF: Brand: MEDLINE INDUSTRIES, INC.

## (undated) DEVICE — PAD,ABDOMINAL,8"X7.5",STERILE,LF,1/PK: Brand: MEDLINE

## (undated) DEVICE — GLOVE SURG SZ 65 L12IN FNGR THK79MIL GRN LTX FREE

## (undated) DEVICE — HANDPIECE SET WITH HIGH FLOW TIP AND SUCTION TUBE: Brand: INTERPULSE

## (undated) DEVICE — ELECTRODE PT RET AD L9FT HI MOIST COND ADH HYDRGEL CORDED

## (undated) DEVICE — GLOVE SURG SZ 8 CRM LTX FREE POLYISOPRENE POLYMER BEAD ANTI

## (undated) DEVICE — SUTURE VICRYL + SZ 0 L18IN ABSRB UD L36MM CT-1 1/2 CIR VCP840D

## (undated) DEVICE — BASIC SINGLE BASIN 1-LF: Brand: MEDLINE INDUSTRIES, INC.

## (undated) DEVICE — SOLUTION IRRIG 1000ML 09% SOD CHL USP PIC PLAS CONTAINER

## (undated) DEVICE — DRAPE,U/SHT,SPLIT,FILM,60X84,STERILE: Brand: MEDLINE

## (undated) DEVICE — MERCY HEALTH WEST TURNOVER: Brand: MEDLINE INDUSTRIES, INC.